# Patient Record
Sex: FEMALE | Race: WHITE | NOT HISPANIC OR LATINO | Employment: OTHER | URBAN - NONMETROPOLITAN AREA
[De-identification: names, ages, dates, MRNs, and addresses within clinical notes are randomized per-mention and may not be internally consistent; named-entity substitution may affect disease eponyms.]

---

## 2018-07-18 ENCOUNTER — TRANSCRIBE ORDERS (OUTPATIENT)
Dept: LAB | Facility: CLINIC | Age: 53
End: 2018-07-18

## 2018-07-18 ENCOUNTER — APPOINTMENT (OUTPATIENT)
Dept: LAB | Facility: CLINIC | Age: 53
End: 2018-07-18
Payer: MEDICARE

## 2018-07-18 DIAGNOSIS — R19.7 DIARRHEA, UNSPECIFIED TYPE: ICD-10-CM

## 2018-07-18 DIAGNOSIS — K21.9 GASTRO-ESOPHAGEAL REFLUX DISEASE WITHOUT ESOPHAGITIS: ICD-10-CM

## 2018-07-18 DIAGNOSIS — R10.31 RIGHT LOWER QUADRANT PAIN: ICD-10-CM

## 2018-07-18 DIAGNOSIS — D51.9 ANEMIA DUE TO VITAMIN B12 DEFICIENCY, UNSPECIFIED B12 DEFICIENCY TYPE: ICD-10-CM

## 2018-07-18 DIAGNOSIS — E55.9 VITAMIN D DEFICIENCY: ICD-10-CM

## 2018-07-18 DIAGNOSIS — K50.018 CROHN'S DISEASE OF SMALL INTESTINE WITH OTHER COMPLICATION (HCC): Primary | ICD-10-CM

## 2018-07-18 DIAGNOSIS — K50.018 CROHN'S DISEASE OF SMALL INTESTINE WITH OTHER COMPLICATION (HCC): ICD-10-CM

## 2018-07-18 LAB
ALBUMIN SERPL BCP-MCNC: 4.3 G/DL (ref 3.5–5)
ALP SERPL-CCNC: 86 U/L (ref 46–116)
ALT SERPL W P-5'-P-CCNC: 25 U/L (ref 12–78)
ANION GAP SERPL CALCULATED.3IONS-SCNC: 7 MMOL/L (ref 4–13)
AST SERPL W P-5'-P-CCNC: 20 U/L (ref 5–45)
BASOPHILS # BLD AUTO: 0.05 THOUSANDS/ΜL (ref 0–0.1)
BASOPHILS NFR BLD AUTO: 1 % (ref 0–1)
BILIRUB SERPL-MCNC: 0.34 MG/DL (ref 0.2–1)
BUN SERPL-MCNC: 8 MG/DL (ref 5–25)
CALCIUM SERPL-MCNC: 9.4 MG/DL (ref 8.3–10.1)
CHLORIDE SERPL-SCNC: 101 MMOL/L (ref 100–108)
CO2 SERPL-SCNC: 28 MMOL/L (ref 21–32)
CREAT SERPL-MCNC: 0.78 MG/DL (ref 0.6–1.3)
EOSINOPHIL # BLD AUTO: 0.15 THOUSAND/ΜL (ref 0–0.61)
EOSINOPHIL NFR BLD AUTO: 2 % (ref 0–6)
ERYTHROCYTE [DISTWIDTH] IN BLOOD BY AUTOMATED COUNT: 12.8 % (ref 11.6–15.1)
ERYTHROCYTE [SEDIMENTATION RATE] IN BLOOD: 16 MM/HOUR (ref 0–20)
GFR SERPL CREATININE-BSD FRML MDRD: 87 ML/MIN/1.73SQ M
GLUCOSE SERPL-MCNC: 86 MG/DL (ref 65–140)
HCT VFR BLD AUTO: 41.7 % (ref 34.8–46.1)
HGB BLD-MCNC: 13.6 G/DL (ref 11.5–15.4)
IMM GRANULOCYTES # BLD AUTO: 0.01 THOUSAND/UL (ref 0–0.2)
IMM GRANULOCYTES NFR BLD AUTO: 0 % (ref 0–2)
LYMPHOCYTES # BLD AUTO: 2.32 THOUSANDS/ΜL (ref 0.6–4.47)
LYMPHOCYTES NFR BLD AUTO: 30 % (ref 14–44)
MCH RBC QN AUTO: 31.5 PG (ref 26.8–34.3)
MCHC RBC AUTO-ENTMCNC: 32.6 G/DL (ref 31.4–37.4)
MCV RBC AUTO: 97 FL (ref 82–98)
MONOCYTES # BLD AUTO: 0.76 THOUSAND/ΜL (ref 0.17–1.22)
MONOCYTES NFR BLD AUTO: 10 % (ref 4–12)
NEUTROPHILS # BLD AUTO: 4.58 THOUSANDS/ΜL (ref 1.85–7.62)
NEUTS SEG NFR BLD AUTO: 57 % (ref 43–75)
NRBC BLD AUTO-RTO: 0 /100 WBCS
PLATELET # BLD AUTO: 239 THOUSANDS/UL (ref 149–390)
PMV BLD AUTO: 13.5 FL (ref 8.9–12.7)
POTASSIUM SERPL-SCNC: 4.1 MMOL/L (ref 3.5–5.3)
PROT SERPL-MCNC: 8 G/DL (ref 6.4–8.2)
RBC # BLD AUTO: 4.32 MILLION/UL (ref 3.81–5.12)
SODIUM SERPL-SCNC: 136 MMOL/L (ref 136–145)
WBC # BLD AUTO: 7.87 THOUSAND/UL (ref 4.31–10.16)

## 2018-07-18 PROCEDURE — 36415 COLL VENOUS BLD VENIPUNCTURE: CPT

## 2018-07-18 PROCEDURE — 85652 RBC SED RATE AUTOMATED: CPT

## 2018-07-18 PROCEDURE — 80053 COMPREHEN METABOLIC PANEL: CPT

## 2018-07-18 PROCEDURE — 85025 COMPLETE CBC W/AUTO DIFF WBC: CPT

## 2018-08-07 ENCOUNTER — TRANSCRIBE ORDERS (OUTPATIENT)
Dept: ADMINISTRATIVE | Facility: HOSPITAL | Age: 53
End: 2018-08-07

## 2018-08-07 DIAGNOSIS — K50.00 CROHN'S DISEASE OF SMALL INTESTINE WITHOUT COMPLICATION (HCC): Primary | ICD-10-CM

## 2018-08-07 DIAGNOSIS — R10.11 RUQ PAIN: ICD-10-CM

## 2018-08-07 DIAGNOSIS — R11.0 NAUSEA: ICD-10-CM

## 2018-08-07 DIAGNOSIS — K21.9 GASTROESOPHAGEAL REFLUX DISEASE WITHOUT ESOPHAGITIS: ICD-10-CM

## 2018-08-07 DIAGNOSIS — D64.9 ANEMIA, UNSPECIFIED TYPE: ICD-10-CM

## 2018-08-07 DIAGNOSIS — E55.9 VITAMIN D DEFICIENCY: ICD-10-CM

## 2018-08-07 DIAGNOSIS — E53.9 VITAMIN B DEFICIENCY: ICD-10-CM

## 2018-08-22 ENCOUNTER — HOSPITAL ENCOUNTER (OUTPATIENT)
Dept: ULTRASOUND IMAGING | Facility: HOSPITAL | Age: 53
Discharge: HOME | End: 2018-08-22
Payer: MEDICARE

## 2018-08-22 ENCOUNTER — HOSPITAL ENCOUNTER (OUTPATIENT)
Dept: NUCLEAR MEDICINE | Facility: HOSPITAL | Age: 53
Discharge: HOME | End: 2018-08-22
Payer: MEDICARE

## 2018-08-22 DIAGNOSIS — R10.11 RUQ PAIN: ICD-10-CM

## 2018-08-22 DIAGNOSIS — E55.9 VITAMIN D DEFICIENCY: ICD-10-CM

## 2018-08-22 DIAGNOSIS — K50.00 CROHN'S DISEASE OF SMALL INTESTINE WITHOUT COMPLICATION (HCC): ICD-10-CM

## 2018-08-22 DIAGNOSIS — E53.9 VITAMIN B DEFICIENCY: ICD-10-CM

## 2018-08-22 DIAGNOSIS — D64.9 ANEMIA, UNSPECIFIED TYPE: ICD-10-CM

## 2018-08-22 DIAGNOSIS — K21.9 GASTROESOPHAGEAL REFLUX DISEASE WITHOUT ESOPHAGITIS: ICD-10-CM

## 2018-08-22 DIAGNOSIS — R11.0 NAUSEA: ICD-10-CM

## 2018-08-22 PROCEDURE — 78227 HEPATOBIL SYST IMAGE W/DRUG: CPT

## 2018-08-22 PROCEDURE — 76705 ECHO EXAM OF ABDOMEN: CPT

## 2018-08-22 PROCEDURE — A9537 TC99M MEBROFENIN: HCPCS

## 2018-08-22 RX ADMIN — SINCALIDE 1 MCG: 5 INJECTION, POWDER, LYOPHILIZED, FOR SOLUTION INTRAVENOUS at 08:50

## 2018-09-27 ENCOUNTER — HOSPITAL ENCOUNTER (OUTPATIENT)
Facility: HOSPITAL | Age: 53
Setting detail: OUTPATIENT SURGERY
Discharge: HOME | End: 2018-09-27
Attending: SURGERY | Admitting: SURGERY
Payer: MEDICARE

## 2018-09-27 ENCOUNTER — APPOINTMENT (OUTPATIENT)
Dept: RADIOLOGY | Facility: HOSPITAL | Age: 53
End: 2018-09-27
Payer: MEDICARE

## 2018-09-27 ENCOUNTER — ANESTHESIA EVENT (OUTPATIENT)
Dept: PERIOP | Facility: HOSPITAL | Age: 53
End: 2018-09-27
Payer: MEDICARE

## 2018-09-27 ENCOUNTER — ANESTHESIA (OUTPATIENT)
Dept: PERIOP | Facility: HOSPITAL | Age: 53
End: 2018-09-27
Payer: MEDICARE

## 2018-09-27 VITALS
HEIGHT: 61 IN | RESPIRATION RATE: 18 BRPM | DIASTOLIC BLOOD PRESSURE: 82 MMHG | HEART RATE: 68 BPM | SYSTOLIC BLOOD PRESSURE: 162 MMHG | OXYGEN SATURATION: 98 % | BODY MASS INDEX: 21.14 KG/M2 | TEMPERATURE: 97 F | WEIGHT: 112 LBS

## 2018-09-27 DIAGNOSIS — K82.8 BILIARY DYSKINESIA: Primary | ICD-10-CM

## 2018-09-27 DIAGNOSIS — K81.1 CHRONIC CHOLECYSTITIS: ICD-10-CM

## 2018-09-27 PROBLEM — K82.4 GALLBLADDER POLYP: Status: ACTIVE | Noted: 2018-09-27

## 2018-09-27 PROCEDURE — 74300 X-RAY BILE DUCTS/PANCREAS: CPT

## 2018-09-27 PROCEDURE — 88304 TISSUE EXAM BY PATHOLOGIST: CPT | Performed by: PATHOLOGY

## 2018-09-27 RX ORDER — CLINDAMYCIN PHOSPHATE 900 MG/50ML
900 INJECTION INTRAVENOUS ONCE
Status: COMPLETED | OUTPATIENT
Start: 2018-09-27 | End: 2018-09-27

## 2018-09-27 RX ORDER — FENTANYL CITRATE 50 UG/ML
INJECTION, SOLUTION INTRAMUSCULAR; INTRAVENOUS AS NEEDED
Status: DISCONTINUED | OUTPATIENT
Start: 2018-09-27 | End: 2018-09-27 | Stop reason: SURG

## 2018-09-27 RX ORDER — METOCLOPRAMIDE HYDROCHLORIDE 5 MG/ML
10 INJECTION INTRAMUSCULAR; INTRAVENOUS ONCE AS NEEDED
Status: DISCONTINUED | OUTPATIENT
Start: 2018-09-27 | End: 2018-09-27 | Stop reason: HOSPADM

## 2018-09-27 RX ORDER — SCOLOPAMINE TRANSDERMAL SYSTEM 1 MG/1
1 PATCH, EXTENDED RELEASE TRANSDERMAL ONCE
Status: DISCONTINUED | OUTPATIENT
Start: 2018-09-27 | End: 2018-09-27 | Stop reason: HOSPADM

## 2018-09-27 RX ORDER — DEXAMETHASONE SODIUM PHOSPHATE 4 MG/ML
INJECTION, SOLUTION INTRA-ARTICULAR; INTRALESIONAL; INTRAMUSCULAR; INTRAVENOUS; SOFT TISSUE AS NEEDED
Status: DISCONTINUED | OUTPATIENT
Start: 2018-09-27 | End: 2018-09-27 | Stop reason: SURG

## 2018-09-27 RX ORDER — PROPOFOL 10 MG/ML
INJECTION, EMULSION INTRAVENOUS AS NEEDED
Status: DISCONTINUED | OUTPATIENT
Start: 2018-09-27 | End: 2018-09-27 | Stop reason: SURG

## 2018-09-27 RX ORDER — SODIUM CHLORIDE, SODIUM LACTATE, POTASSIUM CHLORIDE, CALCIUM CHLORIDE 600; 310; 30; 20 MG/100ML; MG/100ML; MG/100ML; MG/100ML
100 INJECTION, SOLUTION INTRAVENOUS CONTINUOUS
Status: DISCONTINUED | OUTPATIENT
Start: 2018-09-27 | End: 2018-09-27 | Stop reason: HOSPADM

## 2018-09-27 RX ORDER — PROPOFOL 10 MG/ML
INJECTION, EMULSION INTRAVENOUS AS NEEDED
Status: DISCONTINUED | OUTPATIENT
Start: 2018-09-27 | End: 2018-09-27

## 2018-09-27 RX ORDER — OXYCODONE HYDROCHLORIDE AND ACETAMINOPHEN 5; 325 MG/1; MG/1
2 TABLET ORAL EVERY 4 HOURS PRN
Status: DISCONTINUED | OUTPATIENT
Start: 2018-09-27 | End: 2018-09-27 | Stop reason: HOSPADM

## 2018-09-27 RX ORDER — OXYCODONE HYDROCHLORIDE AND ACETAMINOPHEN 5; 325 MG/1; MG/1
1 TABLET ORAL EVERY 4 HOURS PRN
Qty: 15 TABLET | Refills: 0 | Status: SHIPPED | OUTPATIENT
Start: 2018-09-27 | End: 2018-10-07

## 2018-09-27 RX ORDER — KETOROLAC TROMETHAMINE 30 MG/ML
INJECTION, SOLUTION INTRAMUSCULAR; INTRAVENOUS AS NEEDED
Status: DISCONTINUED | OUTPATIENT
Start: 2018-09-27 | End: 2018-09-27 | Stop reason: SURG

## 2018-09-27 RX ORDER — ONDANSETRON 2 MG/ML
INJECTION INTRAMUSCULAR; INTRAVENOUS AS NEEDED
Status: DISCONTINUED | OUTPATIENT
Start: 2018-09-27 | End: 2018-09-27 | Stop reason: SURG

## 2018-09-27 RX ORDER — SODIUM CHLORIDE, SODIUM LACTATE, POTASSIUM CHLORIDE, CALCIUM CHLORIDE 600; 310; 30; 20 MG/100ML; MG/100ML; MG/100ML; MG/100ML
125 INJECTION, SOLUTION INTRAVENOUS CONTINUOUS
Status: DISCONTINUED | OUTPATIENT
Start: 2018-09-27 | End: 2018-09-27 | Stop reason: HOSPADM

## 2018-09-27 RX ORDER — LIDOCAINE HYDROCHLORIDE 10 MG/ML
INJECTION, SOLUTION INFILTRATION; PERINEURAL AS NEEDED
Status: DISCONTINUED | OUTPATIENT
Start: 2018-09-27 | End: 2018-09-27 | Stop reason: SURG

## 2018-09-27 RX ORDER — ROCURONIUM BROMIDE 10 MG/ML
INJECTION, SOLUTION INTRAVENOUS AS NEEDED
Status: DISCONTINUED | OUTPATIENT
Start: 2018-09-27 | End: 2018-09-27 | Stop reason: SURG

## 2018-09-27 RX ORDER — MIDAZOLAM HYDROCHLORIDE 1 MG/ML
INJECTION INTRAMUSCULAR; INTRAVENOUS AS NEEDED
Status: DISCONTINUED | OUTPATIENT
Start: 2018-09-27 | End: 2018-09-27 | Stop reason: SURG

## 2018-09-27 RX ORDER — BUPIVACAINE HYDROCHLORIDE 5 MG/ML
INJECTION, SOLUTION PERINEURAL AS NEEDED
Status: DISCONTINUED | OUTPATIENT
Start: 2018-09-27 | End: 2018-09-27 | Stop reason: HOSPADM

## 2018-09-27 RX ADMIN — FENTANYL CITRATE 100 MCG: 50 INJECTION INTRAMUSCULAR; INTRAVENOUS at 12:23

## 2018-09-27 RX ADMIN — SODIUM CHLORIDE, POTASSIUM CHLORIDE, SODIUM LACTATE AND CALCIUM CHLORIDE: 600; 310; 30; 20 INJECTION, SOLUTION INTRAVENOUS at 13:06

## 2018-09-27 RX ADMIN — PROPOFOL 150 MG: 10 INJECTION, EMULSION INTRAVENOUS at 12:24

## 2018-09-27 RX ADMIN — SUGAMMADEX 102 MG: 100 INJECTION, SOLUTION INTRAVENOUS at 12:55

## 2018-09-27 RX ADMIN — CLINDAMYCIN PHOSPHATE 900 MG: 18 INJECTION, SOLUTION INTRAMUSCULAR; INTRAVENOUS at 12:22

## 2018-09-27 RX ADMIN — DEXAMETHASONE SODIUM PHOSPHATE 4 MG: 4 INJECTION, SOLUTION INTRA-ARTICULAR; INTRALESIONAL; INTRAMUSCULAR; INTRAVENOUS; SOFT TISSUE at 12:40

## 2018-09-27 RX ADMIN — SCOPOLAMINE 1 PATCH: 1 PATCH, EXTENDED RELEASE TRANSDERMAL at 11:32

## 2018-09-27 RX ADMIN — MIDAZOLAM HYDROCHLORIDE 2 MG: 1 INJECTION, SOLUTION INTRAMUSCULAR; INTRAVENOUS at 12:19

## 2018-09-27 RX ADMIN — OXYCODONE HYDROCHLORIDE AND ACETAMINOPHEN 1 TABLET: 5; 325 TABLET ORAL at 14:49

## 2018-09-27 RX ADMIN — FENTANYL CITRATE 50 MCG: 50 INJECTION INTRAMUSCULAR; INTRAVENOUS at 13:15

## 2018-09-27 RX ADMIN — ROCURONIUM BROMIDE 30 MG: 10 INJECTION INTRAVENOUS at 12:24

## 2018-09-27 RX ADMIN — SODIUM CHLORIDE, POTASSIUM CHLORIDE, SODIUM LACTATE AND CALCIUM CHLORIDE 125 ML/HR: 600; 310; 30; 20 INJECTION, SOLUTION INTRAVENOUS at 10:56

## 2018-09-27 RX ADMIN — HYDROMORPHONE HYDROCHLORIDE 0.5 MG: 1 INJECTION, SOLUTION INTRAMUSCULAR; INTRAVENOUS; SUBCUTANEOUS at 13:48

## 2018-09-27 RX ADMIN — LIDOCAINE HYDROCHLORIDE 100 MG: 10 INJECTION, SOLUTION INFILTRATION; PERINEURAL at 12:24

## 2018-09-27 RX ADMIN — FENTANYL CITRATE 25 MCG: 50 INJECTION INTRAMUSCULAR; INTRAVENOUS at 13:01

## 2018-09-27 RX ADMIN — KETOROLAC TROMETHAMINE 30 MG: 30 INJECTION, SOLUTION INTRAMUSCULAR at 12:55

## 2018-09-27 RX ADMIN — ONDANSETRON HYDROCHLORIDE 4 MG: 2 INJECTION, SOLUTION INTRAVENOUS at 12:40

## 2018-09-27 RX ADMIN — FENTANYL CITRATE 25 MCG: 50 INJECTION INTRAMUSCULAR; INTRAVENOUS at 13:09

## 2018-09-27 NOTE — OP NOTE
OPERATIVE REPORT  PATIENT NAME: Cesar Grossman    :  1965  MRN: [id number]  Pt Location: [address] [place] [address] OR ROOM 03    SURGERY DATE: 2018    Surgeon(s) and Role:     * Rachel Howe MD - Primary     * Camron Feliciano PA-C  The PA was necessary to provide expert assistance and optimize patient safety in the abscence of a qualified surgical resident  Preop Diagnosis:  Chronic cholecystitis [K81 1]    Post-Op Diagnosis Codes:     * Chronic cholecystitis [K81 1]     * Biliary dyskinesia [K82 8]     * Gallbladder polyp [K82 4]    Procedure(s) (LRB):  LAPAROSCOPIC CHOLECYSTECTOMY WITH CHOLANGIOGRAM (N/A)    Specimen(s):  ID Type Source Tests Collected by Time Destination   1 : gallbladder and contents  Tissue Gallbladder TISSUE EXAM Rachel Howe MD 2018 1225        Estimated Blood Loss:   Minimal    Drains:       Anesthesia Type:   General    Operative Indications:  Chronic cholecystitis [K81 1]  The patient is a pleasant 51-year-old female presenting with symptomatic biliary dyskinesia as well as a gallbladder polyp described on ultrasound for which definitive diagnosis and treatment by laparoscopic cholecystectomy is now indicated  Operative Findings: At the time of the procedure, 4 quadrants of the abdomen were inspected laparoscopically  The only pathology of note was a chronically inflamed gallbladder  During the course of the dissection the critical structures identified  Cystic duct dissected out circumferentially  Cystic artery dissected out circumferentially  Cystic artery clipped twice below and divided above with under be  A fluoroscopic cholangiogram was performed  This confirmed the proper anatomy  There is free flow of contrast antegrade and retrograde in the common bile duct  No evidence for choledocholithiasis  Free flow of contrast into the duodenal sweep  The cholecystectomy completed laparoscopically  The patient tolerated the procedure well      Complications: None    Procedure and Technique:  The patient was taken to the operating room where they were properly identified, monitored and anesthetized  They received antibiotics perioperatively  Venodyne's were placed prior to the induction of anesthesia for DVT prophylaxis  The abdomen prepped and draped under sterile conditions using aseptic technique  Timeout performed  Skin incised at the umbilicus  Peritoneal cavity entered bluntly with a Julita clamp  11 mm trocar inserted and pneumoperitoneum established to 15 mmHg  4 quadrants of the abdomen and inspected laparoscopically and no additional pathology was identified  3 additional working ports placed  These were 5 mm ports in the epigastrium and right upper quadrant  The patient placed in reverse Trendelenburg, left side down  Gallbladder grasped at its dome retracted cephalad and to the right  Infundibulum grasped and retracted laterally  [place] defined and skeletonized  Cystic duct dissected out circumferentially  Cystic artery dissected out circumferentially  Cystic artery clipped below and divided above with harmonic princess  A clip placed on the up side of the cystic duct  An nick made in the cystic duct and a cholangiocatheter advanced and secured  Fluoroscopic cholangiogram was performed with the above findings noted  Cholangiocatheter removed  Cystic duct clipped twice on the down side and divided between clips  Gallbladder dissected off the liver with harmonic princess  The gallbladder was placed in an Endobag and delivered through the umbilical trocar site  Pneumoperitoneum reestablished to 15 mmHg  Scope advanced and the right upper quadrant inspected  Good hemostasis found  The procedure completed with removal of all ports  The fascial defect at the umbilicus closed with a figure-of-eight suture of 0 Vicryl  Skin closed with subcuticular 4-0 Monocryl suture  Wounds infiltrated with half percent Marcaine  The wounds dressed   The patient extubated and taken to recovery in stable condition         I was present for the entire procedure    Patient Disposition:  PACU     SIGNATURE: Lisa Richter MD  DATE: September 27, 2018  TIME: 1:01 PM

## 2018-09-27 NOTE — ANESTHESIA POSTPROCEDURE EVALUATION
Post-Op Assessment Note      CV Status:  Stable    Mental Status:  Alert and awake    Hydration Status:  Euvolemic    PONV Controlled:  Controlled    Airway Patency:  Patent    Post Op Vitals Reviewed: Yes          Staff: CRNA           BP   134/98   Temp  96 4   Pulse  82   Resp   16   SpO2   95

## 2018-09-27 NOTE — ANESTHESIA PREPROCEDURE EVALUATION
Review of Systems/Medical History  Patient summary reviewed  Chart reviewed  History of anesthetic complications PONV    Cardiovascular  Hyperlipidemia,    Pulmonary       GI/Hepatic    GERD ,  Hiatal hernia,             Endo/Other  History of thyroid disease ,   Comment: Thyroid CA thyroidectomy, ponv    GYN       Hematology   Musculoskeletal       Neurology   Psychology         Lab Results   Component Value Date    WBC 7 87 07/18/2018    HGB 13 6 07/18/2018    HCT 41 7 07/18/2018    MCV 97 07/18/2018     07/18/2018     Lab Results   Component Value Date    CALCIUM 9 4 07/18/2018     07/18/2018    K 4 1 07/18/2018    CO2 28 07/18/2018     07/18/2018    BUN 8 07/18/2018    CREATININE 0 78 07/18/2018     No results found for: INR, PROTIME  No results found for: PTT    Physical Exam    Airway    Mallampati score: III  TM Distance: >3 FB  Neck ROM: full     Dental   No notable dental hx     Cardiovascular  Cardiovascular exam normal    Pulmonary  Pulmonary exam normal     Other Findings        Anesthesia Plan  ASA Score- 2     Anesthesia Type- general with ASA Monitors  Additional Monitors:   Airway Plan: ETT  Plan Factors-    Induction- intravenous  Postoperative Plan- Plan for postoperative opioid use  Planned trial extubation    Informed Consent- Anesthetic plan and risks discussed with patient  I personally reviewed this patient with the CRNA  Discussed and agreed on the Anesthesia Plan with the CRNA  Shruthi Chavarria

## 2018-09-27 NOTE — DISCHARGE INSTRUCTIONS
[facility] discharge instructions    1  No driving 1 week     2  No strenuous activity for 2 weeks  3   If you have a dressing on your skin, remove it in 2 days and then you may shower over the wound with soap and water  4   Notify the office for development of fevers, chills, worsening pain, loss of appetite  5   Call [phone] with any questions or concerns  Biliary Dyskinesia   AMBULATORY CARE:   Biliary dyskinesia  is a condition that causes pain in your gallbladder (in your upper right abdomen)  The gallbladder stores bile made by the liver  Bile is used to help break down fat in the food you eat  The gallbladder has a valve called a sphincter that prevents bile from flowing out of the gallbladder until it is needed  The bile moves through a duct and into the small intestine  If the sphincter is scarred or has spasms, bile cannot flow out of the gallbladder  The bile then flows back into the gallbladder and causes pain  Common signs and symptoms of biliary dyskinesia:   · Pain in your upper right abdomen that lasts at least 30 minutes at a time, and comes and goes    · Severe pain that keeps you from doing your daily activities or wakes you from sleep    · Pain after you eat that continues even after you have a bowel movement or change position    · Jaundice    · Nausea, vomiting, or bloating    · Weight loss without trying, or loss of appetite  Seek care immediately if:   · You have a fever and chills  · Your eyes or skin turn yellow  Contact your healthcare provider if:   · Your urine is dark  · Your pain does not get better with pain medicine  · You have eli-colored bowel movements  · You have new or worsening symptoms  · You have questions or concerns about your condition or care  Treatment:  Your symptoms may go away without treatment   You may need any of the following if your symptoms are severe or continue:  · Prescription pain medicine  may be given  Ask your healthcare provider how to take this medicine safely  Some prescription pain medicines contain acetaminophen  Do not take other medicines that contain acetaminophen without talking to your healthcare provider  Too much acetaminophen may cause liver damage  Prescription pain medicine may cause constipation  Ask your healthcare provider how to prevent or treat constipation  · NSAIDs , such as ibuprofen, help decrease swelling, pain, and fever  This medicine is available with or without a doctor's order  NSAIDs can cause stomach bleeding or kidney problems in certain people  If you take blood thinner medicine, always ask if NSAIDs are safe for you  Always read the medicine label and follow directions  Do not give these medicines to children under 10months of age without direction from your child's healthcare provider  · Surgery  may be used to remove your gallbladder  Gallbladder surgery is usually not done on young children  Manage biliary dyskinesia:   · Maintain a healthy weight  Extra body weight can increase your risk for gallbladder problems, and can make your pain worse  Try not to gain or lose a large amount of weight quickly  This can also increase or worsen your risk for gallbladder problems  Ask your healthcare provider to help you create a healthy weight loss plan if you are overweight  · Eat a variety of healthy foods  Healthy foods include fruits, vegetables, low-fat dairy products, lean meats, fish, and cooked beans  Ask if you need to be on a special diet  Your healthcare provider may recommend a low-fat diet  Choose healthy fats, such as olive oil, canola oil, avocado, and nuts  Omega 3 fats are also healthy  Omega 3 fats are in fish, such as salmon, trout, and tuna  They are also in plant foods such as flaxseed, walnuts, and soybeans  You may also need to avoid any foods that trigger your symptoms    Follow up with your healthcare provider as directed:  Write down your questions so you remember to ask them during your visits  © 2017 [employer] Information is for End User's use only and may not be sold, redistributed or otherwise used for commercial purposes  All illustrations and images included in CareNotes® are the copyrighted property of A D A M , Inc  or [employer]  The above information is an  only  It is not intended as medical advice for individual conditions or treatments  Talk to your doctor, nurse or pharmacist before following any medical regimen to see if it is safe and effective for you

## 2018-10-15 ENCOUNTER — OFFICE VISIT (OUTPATIENT)
Dept: SURGERY | Facility: CLINIC | Age: 53
End: 2018-10-15

## 2018-10-15 DIAGNOSIS — K82.8 BILIARY DYSKINESIA: Primary | ICD-10-CM

## 2018-10-15 DIAGNOSIS — K82.4 GALLBLADDER POLYP: ICD-10-CM

## 2018-10-15 PROCEDURE — 99024 POSTOP FOLLOW-UP VISIT: CPT | Performed by: SURGERY

## 2018-10-15 NOTE — LETTER
October 17, 2018     Arely Parekh MD  [facility]    Patient: Bonnie Romero   YOB: 1965   Date of Visit: 10/15/2018       Dear Dr Twin Meng: Thank you for referring Cristi Serrano to me for evaluation  Below are my notes for this consultation  If you have questions, please do not hesitate to call me  I look forward to following your patient along with you           Sincerely,        Lavelle Hook MD        CC: No Recipients

## 2018-10-15 NOTE — PROGRESS NOTES
Assessment/Plan:    Biliary dyskinesia  The patient is well following the definitive treatment of her symptomatic biliary dyskinesia by laparoscopic cholecystectomy with intraoperative cholangiogram     The operative note and pathology report were explained to the patient  All questions answered to their satisfaction and they have been instructed follow up with [facility] on an as-needed basis  Diagnoses and all orders for this visit:    Biliary dyskinesia    Gallbladder polyp          Subjective:      Patient ID: Priscila Medellin is a 48 y o  female  Nurse Note:  The patient is here today for post op lap sami and she stated that she can feel a stitch on her right lower incision area and she does feel a little sore  Maribel Adenitis MA        The following portions of the patient's history were reviewed and updated as appropriate: allergies, current medications, past family history, past medical history, past social history, past surgical history and problem list     Review of Systems   Constitutional: Negative  HENT: Negative  Eyes: Negative  Respiratory: Negative  Cardiovascular: Negative  Gastrointestinal: Negative  Endocrine: Negative  Genitourinary: Negative  Musculoskeletal: Negative  Skin: Negative  Allergic/Immunologic: Negative  Neurological: Negative  Hematological: Negative  Psychiatric/Behavioral: Negative  Objective: There were no vitals taken for this visit  Physical Exam   Constitutional: She is oriented to person, place, and time  She appears well-developed and well-nourished  HENT:   Head: Normocephalic and atraumatic  Eyes: Pupils are equal, round, and reactive to light  Conjunctivae are normal    Neck: Normal range of motion  Neck supple  Cardiovascular: Normal rate and regular rhythm  Pulmonary/Chest: Effort normal and breath sounds normal    Abdominal: Soft   Bowel sounds are normal    The surgical wounds are clean dry and intact  Musculoskeletal: Normal range of motion  Neurological: She is alert and oriented to person, place, and time  Skin: Skin is warm and dry     Psychiatric: Her behavior is normal  Judgment and thought content normal

## 2018-10-17 NOTE — ASSESSMENT & PLAN NOTE
The patient is well following the definitive treatment of her symptomatic biliary dyskinesia by laparoscopic cholecystectomy with intraoperative cholangiogram     The operative note and pathology report were explained to the patient  All questions answered to their satisfaction and they have been instructed follow up with [facility] on an as-needed basis

## 2018-11-28 ENCOUNTER — TRANSCRIBE ORDERS (OUTPATIENT)
Dept: ADMINISTRATIVE | Facility: HOSPITAL | Age: 53
End: 2018-11-28

## 2018-11-28 ENCOUNTER — APPOINTMENT (OUTPATIENT)
Dept: LAB | Facility: HOSPITAL | Age: 53
End: 2018-11-28
Attending: INTERNAL MEDICINE
Payer: MEDICARE

## 2018-11-28 DIAGNOSIS — K50.919 CROHN'S DISEASE WITH COMPLICATION, UNSPECIFIED GASTROINTESTINAL TRACT LOCATION (HCC): ICD-10-CM

## 2018-11-28 DIAGNOSIS — K50.919 CROHN'S DISEASE WITH COMPLICATION, UNSPECIFIED GASTROINTESTINAL TRACT LOCATION (HCC): Primary | ICD-10-CM

## 2018-11-28 LAB
25(OH)D3 SERPL-MCNC: 35.9 NG/ML (ref 30–100)
ALBUMIN SERPL BCP-MCNC: 5 G/DL (ref 3.5–5.7)
ALP SERPL-CCNC: 82 U/L (ref 40–150)
ALT SERPL W P-5'-P-CCNC: 12 U/L (ref 7–52)
ANION GAP SERPL CALCULATED.3IONS-SCNC: 8 MMOL/L (ref 4–13)
AST SERPL W P-5'-P-CCNC: 19 U/L (ref 13–39)
BASOPHILS # BLD AUTO: 0.1 THOUSANDS/ΜL (ref 0–0.1)
BASOPHILS NFR BLD AUTO: 1 % (ref 0–2)
BILIRUB SERPL-MCNC: 0.5 MG/DL (ref 0.2–1)
BILIRUB UR QL STRIP: NEGATIVE
BUN SERPL-MCNC: 7 MG/DL (ref 7–25)
CALCIUM SERPL-MCNC: 9.9 MG/DL (ref 8.6–10.5)
CHLORIDE SERPL-SCNC: 99 MMOL/L (ref 98–107)
CHOLEST SERPL-MCNC: 188 MG/DL (ref 0–200)
CLARITY UR: CLEAR
CO2 SERPL-SCNC: 31 MMOL/L (ref 21–31)
COLOR UR: ABNORMAL
CREAT SERPL-MCNC: 0.81 MG/DL (ref 0.6–1.2)
EOSINOPHIL # BLD AUTO: 0.1 THOUSAND/ΜL (ref 0–0.61)
EOSINOPHIL NFR BLD AUTO: 2 % (ref 0–5)
ERYTHROCYTE [DISTWIDTH] IN BLOOD BY AUTOMATED COUNT: 13 % (ref 11.5–14.5)
GFR SERPL CREATININE-BSD FRML MDRD: 83 ML/MIN/1.73SQ M
GLUCOSE SERPL-MCNC: 96 MG/DL (ref 65–99)
GLUCOSE UR STRIP-MCNC: NEGATIVE MG/DL
HCT VFR BLD AUTO: 42.4 % (ref 34.8–46.1)
HDLC SERPL-MCNC: 76 MG/DL (ref 40–60)
HGB BLD-MCNC: 13.7 G/DL (ref 12–16)
HGB UR QL STRIP.AUTO: NEGATIVE
IRON SERPL-MCNC: 79 UG/DL (ref 50–170)
KETONES UR STRIP-MCNC: NEGATIVE MG/DL
LDLC SERPL CALC-MCNC: 92 MG/DL (ref 75–193)
LEUKOCYTE ESTERASE UR QL STRIP: NEGATIVE
LYMPHOCYTES # BLD AUTO: 1.9 THOUSANDS/ΜL (ref 0.6–4.47)
LYMPHOCYTES NFR BLD AUTO: 28 % (ref 21–51)
MCH RBC QN AUTO: 30.5 PG (ref 26–34)
MCHC RBC AUTO-ENTMCNC: 32.4 G/DL (ref 31–37)
MCV RBC AUTO: 94 FL (ref 81–99)
MONOCYTES # BLD AUTO: 0.6 THOUSAND/ΜL (ref 0.17–1.22)
MONOCYTES NFR BLD AUTO: 9 % (ref 2–12)
NEUTROPHILS # BLD AUTO: 4.1 THOUSANDS/ΜL (ref 1.4–6.5)
NEUTS SEG NFR BLD AUTO: 61 % (ref 42–75)
NITRITE UR QL STRIP: NEGATIVE
NONHDLC SERPL-MCNC: 112 MG/DL
NRBC BLD AUTO-RTO: 0 /100 WBCS
PH UR STRIP.AUTO: 6.5 [PH] (ref 5–8)
PLATELET # BLD AUTO: 243 THOUSANDS/UL (ref 149–390)
PMV BLD AUTO: 10.4 FL (ref 8.6–11.7)
POTASSIUM SERPL-SCNC: 3.8 MMOL/L (ref 3.5–5.5)
PROT SERPL-MCNC: 7.8 G/DL (ref 6.4–8.9)
PROT UR STRIP-MCNC: NEGATIVE MG/DL
RBC # BLD AUTO: 4.51 MILLION/UL (ref 3.9–5.2)
SODIUM SERPL-SCNC: 138 MMOL/L (ref 134–143)
SP GR UR STRIP.AUTO: <=1.005 (ref 1–1.03)
T4 FREE SERPL-MCNC: 1.1 NG/DL (ref 0.76–1.46)
TRIGL SERPL-MCNC: 100 MG/DL (ref 44–166)
TSH SERPL DL<=0.05 MIU/L-ACNC: 9.31 UIU/ML (ref 0.45–5.33)
UROBILINOGEN UR QL STRIP.AUTO: 0.2 E.U./DL
VIT B12 SERPL-MCNC: 1821 PG/ML (ref 100–900)
WBC # BLD AUTO: 6.7 THOUSAND/UL (ref 4.8–10.8)

## 2018-11-28 PROCEDURE — 83540 ASSAY OF IRON: CPT

## 2018-11-28 PROCEDURE — 36415 COLL VENOUS BLD VENIPUNCTURE: CPT

## 2018-11-28 PROCEDURE — 82607 VITAMIN B-12: CPT

## 2018-11-28 PROCEDURE — 80053 COMPREHEN METABOLIC PANEL: CPT

## 2018-11-28 PROCEDURE — 80061 LIPID PANEL: CPT

## 2018-11-28 PROCEDURE — 81003 URINALYSIS AUTO W/O SCOPE: CPT | Performed by: INTERNAL MEDICINE

## 2018-11-28 PROCEDURE — 84439 ASSAY OF FREE THYROXINE: CPT

## 2018-11-28 PROCEDURE — 82306 VITAMIN D 25 HYDROXY: CPT

## 2018-11-28 PROCEDURE — 85025 COMPLETE CBC W/AUTO DIFF WBC: CPT

## 2018-11-28 PROCEDURE — 84443 ASSAY THYROID STIM HORMONE: CPT

## 2018-12-07 ENCOUNTER — TRANSCRIBE ORDERS (OUTPATIENT)
Dept: ADMINISTRATIVE | Facility: HOSPITAL | Age: 53
End: 2018-12-07

## 2018-12-07 DIAGNOSIS — Z12.39 SCREENING BREAST EXAMINATION: Primary | ICD-10-CM

## 2018-12-10 ENCOUNTER — HOSPITAL ENCOUNTER (OUTPATIENT)
Dept: MAMMOGRAPHY | Facility: HOSPITAL | Age: 53
Discharge: HOME | End: 2018-12-10
Attending: OBSTETRICS & GYNECOLOGY
Payer: MEDICARE

## 2018-12-10 DIAGNOSIS — Z12.39 SCREENING BREAST EXAMINATION: ICD-10-CM

## 2018-12-10 PROCEDURE — 77063 BREAST TOMOSYNTHESIS BI: CPT

## 2018-12-10 PROCEDURE — 77067 SCR MAMMO BI INCL CAD: CPT

## 2019-01-07 ENCOUNTER — APPOINTMENT (OUTPATIENT)
Dept: LAB | Facility: HOSPITAL | Age: 54
End: 2019-01-07
Attending: INTERNAL MEDICINE
Payer: MEDICARE

## 2019-01-07 ENCOUNTER — TRANSCRIBE ORDERS (OUTPATIENT)
Dept: ADMINISTRATIVE | Facility: HOSPITAL | Age: 54
End: 2019-01-07

## 2019-01-07 DIAGNOSIS — E03.9 HYPOTHYROIDISM, UNSPECIFIED TYPE: Primary | ICD-10-CM

## 2019-01-07 DIAGNOSIS — E03.9 HYPOTHYROIDISM, UNSPECIFIED TYPE: ICD-10-CM

## 2019-01-07 LAB
T4 FREE SERPL-MCNC: 1.17 NG/DL (ref 0.76–1.46)
TSH SERPL DL<=0.05 MIU/L-ACNC: 2.51 UIU/ML (ref 0.45–5.33)

## 2019-01-07 PROCEDURE — 36415 COLL VENOUS BLD VENIPUNCTURE: CPT

## 2019-01-07 PROCEDURE — 84439 ASSAY OF FREE THYROXINE: CPT

## 2019-01-07 PROCEDURE — 84443 ASSAY THYROID STIM HORMONE: CPT

## 2019-01-18 ENCOUNTER — HOSPITAL ENCOUNTER (OUTPATIENT)
Dept: RADIOLOGY | Facility: HOSPITAL | Age: 54
Discharge: HOME | End: 2019-01-18
Attending: INTERNAL MEDICINE
Payer: MEDICARE

## 2019-01-18 ENCOUNTER — TRANSCRIBE ORDERS (OUTPATIENT)
Dept: ADMINISTRATIVE | Facility: HOSPITAL | Age: 54
End: 2019-01-18

## 2019-01-18 DIAGNOSIS — R52 PAIN: Primary | ICD-10-CM

## 2019-01-18 DIAGNOSIS — R52 PAIN: ICD-10-CM

## 2019-01-18 PROCEDURE — 72070 X-RAY EXAM THORAC SPINE 2VWS: CPT

## 2019-02-19 ENCOUNTER — TRANSCRIBE ORDERS (OUTPATIENT)
Dept: ADMINISTRATIVE | Facility: HOSPITAL | Age: 54
End: 2019-02-19

## 2019-02-19 DIAGNOSIS — Z13.820 SCREENING FOR OSTEOPOROSIS: Primary | ICD-10-CM

## 2019-02-22 ENCOUNTER — HOSPITAL ENCOUNTER (OUTPATIENT)
Dept: BONE DENSITY | Facility: HOSPITAL | Age: 54
Discharge: HOME | End: 2019-02-22
Payer: MEDICARE

## 2019-02-22 DIAGNOSIS — Z13.820 SCREENING FOR OSTEOPOROSIS: ICD-10-CM

## 2019-02-22 PROCEDURE — 77080 DXA BONE DENSITY AXIAL: CPT

## 2019-05-09 ENCOUNTER — APPOINTMENT (OUTPATIENT)
Dept: LAB | Facility: HOSPITAL | Age: 54
End: 2019-05-09
Attending: PHYSICIAN ASSISTANT
Payer: MEDICARE

## 2019-05-09 ENCOUNTER — TRANSCRIBE ORDERS (OUTPATIENT)
Dept: RADIOLOGY | Facility: HOSPITAL | Age: 54
End: 2019-05-09

## 2019-05-09 DIAGNOSIS — K50.919 CROHN'S DISEASE WITH COMPLICATION, UNSPECIFIED GASTROINTESTINAL TRACT LOCATION (HCC): Primary | ICD-10-CM

## 2019-05-09 PROCEDURE — 36415 COLL VENOUS BLD VENIPUNCTURE: CPT

## 2019-05-09 PROCEDURE — 80299 QUANTITATIVE ASSAY DRUG: CPT

## 2019-05-09 PROCEDURE — 82397 CHEMILUMINESCENT ASSAY: CPT

## 2019-05-21 LAB — MISCELLANEOUS LAB TEST RESULT: NORMAL

## 2019-05-30 ENCOUNTER — TRANSCRIBE ORDERS (OUTPATIENT)
Dept: ADMINISTRATIVE | Facility: HOSPITAL | Age: 54
End: 2019-05-30

## 2019-05-30 DIAGNOSIS — R19.7 DIARRHEA, UNSPECIFIED TYPE: ICD-10-CM

## 2019-05-30 DIAGNOSIS — R10.31 CHRONIC RLQ PAIN: Primary | ICD-10-CM

## 2019-05-30 DIAGNOSIS — R92.8 ABNORMAL MAMMOGRAM: ICD-10-CM

## 2019-05-30 DIAGNOSIS — N60.92 BENIGN MAMMARY DYSPLASIA OF LEFT BREAST: ICD-10-CM

## 2019-05-30 DIAGNOSIS — N60.01 SOLITARY BENIGN CYST OF RIGHT BREAST: Primary | ICD-10-CM

## 2019-05-30 DIAGNOSIS — G89.29 CHRONIC RLQ PAIN: Primary | ICD-10-CM

## 2019-05-30 DIAGNOSIS — K50.919 CROHN'S DISEASE WITH COMPLICATION, UNSPECIFIED GASTROINTESTINAL TRACT LOCATION (HCC): ICD-10-CM

## 2019-06-14 ENCOUNTER — HOSPITAL ENCOUNTER (OUTPATIENT)
Dept: MAMMOGRAPHY | Facility: HOSPITAL | Age: 54
Discharge: HOME | End: 2019-06-14
Attending: OBSTETRICS & GYNECOLOGY
Payer: MEDICARE

## 2019-06-14 VITALS — HEIGHT: 61 IN | BODY MASS INDEX: 20.39 KG/M2 | WEIGHT: 108 LBS

## 2019-06-14 DIAGNOSIS — R92.8 ABNORMAL MAMMOGRAM: ICD-10-CM

## 2019-06-14 DIAGNOSIS — N60.01 SOLITARY BENIGN CYST OF RIGHT BREAST: ICD-10-CM

## 2019-06-14 DIAGNOSIS — N60.92 BENIGN MAMMARY DYSPLASIA OF LEFT BREAST: ICD-10-CM

## 2019-06-14 PROCEDURE — 77066 DX MAMMO INCL CAD BI: CPT

## 2019-06-14 PROCEDURE — G0279 TOMOSYNTHESIS, MAMMO: HCPCS

## 2019-07-10 ENCOUNTER — TRANSCRIBE ORDERS (OUTPATIENT)
Dept: LAB | Facility: HOSPITAL | Age: 54
End: 2019-07-10

## 2019-07-10 ENCOUNTER — APPOINTMENT (OUTPATIENT)
Dept: LAB | Facility: HOSPITAL | Age: 54
End: 2019-07-10
Attending: PHYSICIAN ASSISTANT
Payer: MEDICARE

## 2019-07-10 DIAGNOSIS — K50.919 CROHN'S DISEASE WITH COMPLICATION, UNSPECIFIED GASTROINTESTINAL TRACT LOCATION (HCC): Primary | ICD-10-CM

## 2019-07-10 DIAGNOSIS — K50.919 CROHN'S DISEASE WITH COMPLICATION, UNSPECIFIED GASTROINTESTINAL TRACT LOCATION (HCC): ICD-10-CM

## 2019-07-10 LAB
BASOPHILS # BLD AUTO: 0 THOUSANDS/ΜL (ref 0–0.1)
BASOPHILS NFR BLD AUTO: 1 % (ref 0–2)
CRP SERPL QL: <3 MG/L
EOSINOPHIL # BLD AUTO: 0.1 THOUSAND/ΜL (ref 0–0.61)
EOSINOPHIL NFR BLD AUTO: 2 % (ref 0–5)
ERYTHROCYTE [DISTWIDTH] IN BLOOD BY AUTOMATED COUNT: 13.3 % (ref 11.5–14.5)
ERYTHROCYTE [SEDIMENTATION RATE] IN BLOOD: 13 MM/HOUR (ref 0–30)
HCT VFR BLD AUTO: 41 % (ref 42–47)
HGB BLD-MCNC: 14.1 G/DL (ref 12–16)
LYMPHOCYTES # BLD AUTO: 2.1 THOUSANDS/ΜL (ref 0.6–4.47)
LYMPHOCYTES NFR BLD AUTO: 31 % (ref 21–51)
MCH RBC QN AUTO: 31.9 PG (ref 26–34)
MCHC RBC AUTO-ENTMCNC: 34.3 G/DL (ref 31–37)
MCV RBC AUTO: 93 FL (ref 81–99)
MONOCYTES # BLD AUTO: 0.6 THOUSAND/ΜL (ref 0.17–1.22)
MONOCYTES NFR BLD AUTO: 9 % (ref 2–12)
NEUTROPHILS # BLD AUTO: 4 THOUSANDS/ΜL (ref 1.4–6.5)
NEUTS SEG NFR BLD AUTO: 58 % (ref 42–75)
PLATELET # BLD AUTO: 217 THOUSANDS/UL (ref 149–390)
PMV BLD AUTO: 10.1 FL (ref 8.6–11.7)
RBC # BLD AUTO: 4.4 MILLION/UL (ref 3.9–5.2)
WBC # BLD AUTO: 6.9 THOUSAND/UL (ref 4.8–10.8)

## 2019-07-10 PROCEDURE — 86140 C-REACTIVE PROTEIN: CPT

## 2019-07-10 PROCEDURE — 85652 RBC SED RATE AUTOMATED: CPT

## 2019-07-10 PROCEDURE — 36415 COLL VENOUS BLD VENIPUNCTURE: CPT

## 2019-07-10 PROCEDURE — 85025 COMPLETE CBC W/AUTO DIFF WBC: CPT

## 2019-10-01 RX ORDER — PANTOPRAZOLE SODIUM 40 MG/1
40 TABLET, DELAYED RELEASE ORAL
COMMUNITY
End: 2020-04-21 | Stop reason: ALTCHOICE

## 2019-10-02 ENCOUNTER — PROCEDURE VISIT (OUTPATIENT)
Dept: SURGERY | Facility: CLINIC | Age: 54
End: 2019-10-02
Payer: MEDICARE

## 2019-10-02 VITALS
SYSTOLIC BLOOD PRESSURE: 110 MMHG | DIASTOLIC BLOOD PRESSURE: 68 MMHG | TEMPERATURE: 97.2 F | HEIGHT: 61 IN | RESPIRATION RATE: 16 BRPM | WEIGHT: 108 LBS | HEART RATE: 68 BPM | BODY MASS INDEX: 20.39 KG/M2

## 2019-10-02 DIAGNOSIS — K50.90 CROHN'S DISEASE (HCC): Primary | ICD-10-CM

## 2019-10-02 PROCEDURE — 99214 OFFICE O/P EST MOD 30 MIN: CPT | Performed by: SURGERY

## 2019-10-02 RX ORDER — ONDANSETRON 2 MG/ML
4 INJECTION INTRAMUSCULAR; INTRAVENOUS ONCE
Status: CANCELLED | OUTPATIENT
Start: 2019-10-02 | End: 2019-10-02

## 2019-10-02 RX ORDER — MULTIVIT-MIN/IRON/FOLIC ACID/K 18-600-40
2000 CAPSULE ORAL DAILY
COMMUNITY

## 2019-10-02 RX ORDER — LEVOFLOXACIN 5 MG/ML
500 INJECTION, SOLUTION INTRAVENOUS ONCE
Status: CANCELLED | OUTPATIENT
Start: 2019-10-10 | End: 2019-10-02

## 2019-10-02 RX ORDER — SUCRALFATE 1 G/1
1 TABLET ORAL
COMMUNITY
Start: 2019-08-08 | End: 2019-11-01

## 2019-10-02 RX ORDER — PREDNISOLONE ACETATE 10 MG/ML
SUSPENSION/ DROPS OPHTHALMIC
Refills: 0 | COMMUNITY
Start: 2019-09-24 | End: 2019-11-01

## 2019-10-02 RX ORDER — OMEPRAZOLE 20 MG/1
20 CAPSULE, DELAYED RELEASE ORAL
COMMUNITY
Start: 2019-09-24 | End: 2020-08-20 | Stop reason: ALTCHOICE

## 2019-10-02 NOTE — ASSESSMENT & PLAN NOTE
Patient is a pleasant 40-year-old female with a past medical history significant for Crohn's disease for which she is currently receiving infusion therapy with Entyvio  Due to breakthrough symptoms the patient is therapy is going to be increased from Q 8 weeks 2 Q 6 weeks  Due to the lack of reliable peripheral vascular access she has been recommended to undergo MediPort placement  On physical examination today she is a pleasant female who appears her stated age  She is awake alert and oriented  She has a competent reliable historian  She has a normal chest wall to inspection  Her lungs are clear to auscultation bilaterally  My impression is that the patient is an appropriate candidate for MediPort insertion under fluoroscopy  The technical details of the procedures well as the benefits and risks were reviewed  Specific risks related to anesthesia, bleeding, infection, neurovascular injury and 2% risk of pneumothorax requiring chest tube insertion were all explained  All questions answered to satisfaction of the patient and informed consent taken to proceed  We will attempt a left-sided placement 1st and the patient understands that if we are unsuccessful will move to the right

## 2019-10-02 NOTE — H&P
Assessment/Plan:    Crohn's disease ([facility] 75 )  Patient is a pleasant 71-year-old female with a past medical history significant for Crohn's disease for which she is currently receiving infusion therapy with Entyvio  Due to breakthrough symptoms the patient is therapy is going to be increased from Q 8 weeks 2 Q 6 weeks  Due to the lack of reliable peripheral vascular access she has been recommended to undergo MediPort placement  On physical examination today she is a pleasant female who appears her stated age  She is awake alert and oriented  She has a competent reliable historian  She has a normal chest wall to inspection  Her lungs are clear to auscultation bilaterally  My impression is that the patient is an appropriate candidate for MediPort insertion under fluoroscopy  The technical details of the procedures well as the benefits and risks were reviewed  Specific risks related to anesthesia, bleeding, infection, neurovascular injury and 2% risk of pneumothorax requiring chest tube insertion were all explained  All questions answered to satisfaction of the patient and informed consent taken to proceed  We will attempt a left-sided placement 1st and the patient understands that if we are unsuccessful will move to the right  Diagnoses and all orders for this visit:    Crohn's disease ([facility] 75 )    Other orders  -     pantoprazole (PROTONIX) 40 mg tablet; Take 40 mg by mouth daily with breakfast   -     prednisoLONE acetate (PRED FORTE) 1 % ophthalmic suspension; INSTILL 1 DROP INTO LASERED EYE 4 TIMES A DAY FOR 2 WEEKS  -     omeprazole (PriLOSEC) 20 mg delayed release capsule; Take 20 mg by mouth daily at bedtime   -     sucralfate (CARAFATE) 1 g tablet; Take 1 g by mouth daily at bedtime  -     Multiple Vitamin (MULTI VITAMIN DAILY PO); Take by mouth daily  -     Cholecalciferol (VITAMIN D) 2000 units CAPS; Take 2,000 Units by mouth daily  -     Calcium Carbonate Antacid (TUMS PO);  Take 1,000 caplets by mouth daily  -     vedolizumab (ENTYVIO) 300 MG SOLR; Infuse 300 mg into a venous catheter 0,2,6 weeks and then every 8 weeks          Subjective:      Patient ID: Geri Sabillon is a 47 y o  female  10- Patient is here today for possible pre op Mediport for future Entyvio infusion  These infusions will be every 6 weeks as she will be having multiple (3+) needle sticks per each infusion  Colorectal screening was reviewed and she is following up with the adequate care  Anshu Allan      The following portions of the patient's history were reviewed and updated as appropriate: allergies, current medications, past family history, past medical history, past social history, past surgical history and problem list     Review of Systems   Constitutional: Negative  HENT: Negative  Eyes: Negative  Respiratory: Negative  Cardiovascular: Negative  Gastrointestinal: Positive for abdominal distention and abdominal pain  Endocrine: Negative  Genitourinary: Negative  Musculoskeletal: Negative  Skin: Negative  Allergic/Immunologic: Negative  Neurological: Negative  Hematological: Negative  Psychiatric/Behavioral: Negative  Objective:      /68   Pulse 68   Temp (!) 97 2 °F (36 2 °C) (Tympanic)   Resp 16   Ht 5' 1" (1 549 m)   Wt 49 kg (108 lb)   BMI 20 41 kg/m²           Physical Exam   Constitutional: She is oriented to person, place, and time  She appears well-developed and well-nourished  HENT:   Head: Normocephalic and atraumatic  Eyes: Pupils are equal, round, and reactive to light  Conjunctivae are normal    Neck: Normal range of motion  Neck supple  Cardiovascular: Normal rate and regular rhythm  Pulmonary/Chest: Effort normal and breath sounds normal    Abdominal: Soft  Bowel sounds are normal    Musculoskeletal: Normal range of motion  Neurological: She is alert and oriented to person, place, and time  Skin: Skin is warm and dry  Psychiatric: Her behavior is normal  Judgment and thought content normal

## 2019-10-02 NOTE — H&P (VIEW-ONLY)
Assessment/Plan:    Crohn's disease ([facility] 75 )  Patient is a pleasant 51-year-old female with a past medical history significant for Crohn's disease for which she is currently receiving infusion therapy with Entyvio  Due to breakthrough symptoms the patient is therapy is going to be increased from Q 8 weeks 2 Q 6 weeks  Due to the lack of reliable peripheral vascular access she has been recommended to undergo MediPort placement  On physical examination today she is a pleasant female who appears her stated age  She is awake alert and oriented  She has a competent reliable historian  She has a normal chest wall to inspection  Her lungs are clear to auscultation bilaterally  My impression is that the patient is an appropriate candidate for MediPort insertion under fluoroscopy  The technical details of the procedures well as the benefits and risks were reviewed  Specific risks related to anesthesia, bleeding, infection, neurovascular injury and 2% risk of pneumothorax requiring chest tube insertion were all explained  All questions answered to satisfaction of the patient and informed consent taken to proceed  We will attempt a left-sided placement 1st and the patient understands that if we are unsuccessful will move to the right  Diagnoses and all orders for this visit:    Crohn's disease ([facility] 75 )    Other orders  -     pantoprazole (PROTONIX) 40 mg tablet; Take 40 mg by mouth daily with breakfast   -     prednisoLONE acetate (PRED FORTE) 1 % ophthalmic suspension; INSTILL 1 DROP INTO LASERED EYE 4 TIMES A DAY FOR 2 WEEKS  -     omeprazole (PriLOSEC) 20 mg delayed release capsule; Take 20 mg by mouth daily at bedtime   -     sucralfate (CARAFATE) 1 g tablet; Take 1 g by mouth daily at bedtime  -     Multiple Vitamin (MULTI VITAMIN DAILY PO); Take by mouth daily  -     Cholecalciferol (VITAMIN D) 2000 units CAPS; Take 2,000 Units by mouth daily  -     Calcium Carbonate Antacid (TUMS PO);  Take 1,000 caplets by mouth daily  -     vedolizumab (ENTYVIO) 300 MG SOLR; Infuse 300 mg into a venous catheter 0,2,6 weeks and then every 8 weeks          Subjective:      Patient ID: Luis Alberto Martin is a 47 y o  female  10- Patient is here today for possible pre op Mediport for future Entyvio infusion  These infusions will be every 6 weeks as she will be having multiple (3+) needle sticks per each infusion  Colorectal screening was reviewed and she is following up with the adequate care  [address] [id number]      The following portions of the patient's history were reviewed and updated as appropriate: allergies, current medications, past family history, past medical history, past social history, past surgical history and problem list     Review of Systems   Constitutional: Negative  HENT: Negative  Eyes: Negative  Respiratory: Negative  Cardiovascular: Negative  Gastrointestinal: Positive for abdominal distention and abdominal pain  Endocrine: Negative  Genitourinary: Negative  Musculoskeletal: Negative  Skin: Negative  Allergic/Immunologic: Negative  Neurological: Negative  Hematological: Negative  Psychiatric/Behavioral: Negative  Objective:      /68   Pulse 68   Temp (!) 97 2 °F (36 2 °C) (Tympanic)   Resp 16   Ht 5' 1" (1 549 m)   Wt 49 kg (108 lb)   BMI 20 41 kg/m²           Physical Exam   Constitutional: She is oriented to person, place, and time  She appears well-developed and well-nourished  HENT:   Head: Normocephalic and atraumatic  Eyes: Pupils are equal, round, and reactive to light  Conjunctivae are normal    Neck: Normal range of motion  Neck supple  Cardiovascular: Normal rate and regular rhythm  Pulmonary/Chest: Effort normal and breath sounds normal    Abdominal: Soft  Bowel sounds are normal    Musculoskeletal: Normal range of motion  Neurological: She is alert and oriented to person, place, and time  Skin: Skin is warm and dry  Psychiatric: Her behavior is normal  Judgment and thought content normal

## 2019-10-02 NOTE — PROGRESS NOTES
Assessment/Plan:    Crohn's disease ([facility] )  Patient is a pleasant 51-year-old female with a past medical history significant for Crohn's disease for which she is currently receiving infusion therapy with Entyvio  Due to breakthrough symptoms the patient is therapy is going to be increased from Q 8 weeks 2 Q 6 weeks  Due to the lack of reliable peripheral vascular access she has been recommended to undergo MediPort placement  On physical examination today she is a pleasant female who appears her stated age  She is awake alert and oriented  She has a competent reliable historian  She has a normal chest wall to inspection  Her lungs are clear to auscultation bilaterally  My impression is that the patient is an appropriate candidate for MediPort insertion under fluoroscopy  The technical details of the procedures well as the benefits and risks were reviewed  Specific risks related to anesthesia, bleeding, infection, neurovascular injury and 2% risk of pneumothorax requiring chest tube insertion were all explained  All questions answered to satisfaction of the patient and informed consent taken to proceed  We will attempt a left-sided placement 1st and the patient understands that if we are unsuccessful will move to the right  Diagnoses and all orders for this visit:    Crohn's disease ([facility] )    Other orders  -     pantoprazole (PROTONIX) 40 mg tablet; Take 40 mg by mouth daily with breakfast   -     prednisoLONE acetate (PRED FORTE) 1 % ophthalmic suspension; INSTILL 1 DROP INTO LASERED EYE 4 TIMES A DAY FOR 2 WEEKS  -     omeprazole (PriLOSEC) 20 mg delayed release capsule; Take 20 mg by mouth daily at bedtime   -     sucralfate (CARAFATE) 1 g tablet; Take 1 g by mouth daily at bedtime  -     Multiple Vitamin (MULTI VITAMIN DAILY PO); Take by mouth daily  -     Cholecalciferol (VITAMIN D) 2000 units CAPS; Take 2,000 Units by mouth daily  -     Calcium Carbonate Antacid (TUMS PO);  Take 1,000 caplets by mouth daily  -     vedolizumab (ENTYVIO) 300 MG SOLR; Infuse 300 mg into a venous catheter 0,2,6 weeks and then every 8 weeks          Subjective:      Patient ID: Carol Almendarez is a 47 y o  female  10- Patient is here today for possible pre op Mediport for future Entyvio infusion  These infusions will be every 6 weeks as she will be having multiple (3+) needle sticks per each infusion  Colorectal screening was reviewed and she is following up with the [facility]      The following portions of the patient's history were reviewed and updated as appropriate: allergies, current medications, past family history, past medical history, past social history, past surgical history and problem list     Review of Systems   Constitutional: Negative  HENT: Negative  Eyes: Negative  Respiratory: Negative  Cardiovascular: Negative  Gastrointestinal: Positive for abdominal distention and abdominal pain  Endocrine: Negative  Genitourinary: Negative  Musculoskeletal: Negative  Skin: Negative  Allergic/Immunologic: Negative  Neurological: Negative  Hematological: Negative  Psychiatric/Behavioral: Negative  Objective:      /68   Pulse 68   Temp (!) 97 2 °F (36 2 °C) (Tympanic)   Resp 16   Ht 5' 1" (1 549 m)   Wt 49 kg (108 lb)   BMI 20 41 kg/m²          Physical Exam   Constitutional: She is oriented to person, place, and time  She appears well-developed and well-nourished  HENT:   Head: Normocephalic and atraumatic  Eyes: Pupils are equal, round, and reactive to light  Conjunctivae are normal    Neck: Normal range of motion  Neck supple  Cardiovascular: Normal rate and regular rhythm  Pulmonary/Chest: Effort normal and breath sounds normal    Abdominal: Soft  Bowel sounds are normal    Musculoskeletal: Normal range of motion  Neurological: She is alert and oriented to person, place, and time  Skin: Skin is warm and dry  Psychiatric: Her behavior is normal  Judgment and thought content normal

## 2019-10-02 NOTE — LETTER
October 2, 2019     Nury Shahid MD  [address]  [address]    Patient: Dominick Ellsworth   YOB: 1965   Date of Visit: 10/2/2019       Dear Dr Araceli Willoughby: Thank you for referring Katieclarissa Nice to me for evaluation  Below are my notes for this consultation  If you have questions, please do not hesitate to call me  I look forward to following your patient along with you  Sincerely,        Blanca Way MD        CC: No Recipients  Blanca Way MD  10/2/2019 10:12 AM  Sign at close encounter  Assessment/Plan:    Crohn's disease [facility])  Patient is a pleasant 55-year-old female with a past medical history significant for Crohn's disease for which she is currently receiving infusion therapy with Entyvio  Due to breakthrough symptoms the patient is therapy is going to be increased from Q 8 weeks 2 Q 6 weeks  Due to the lack of reliable peripheral vascular access she has been recommended to undergo MediPort placement  On physical examination today she is a pleasant female who appears her stated age  She is awake alert and oriented  She has a competent reliable historian  She has a normal chest wall to inspection  Her lungs are clear to auscultation bilaterally  My impression is that the patient is an appropriate candidate for MediPort insertion under fluoroscopy  The technical details of the procedures well as the benefits and risks were reviewed  Specific risks related to anesthesia, bleeding, infection, neurovascular injury and 2% risk of pneumothorax requiring chest tube insertion were all explained  All questions answered to satisfaction of the patient and informed consent taken to proceed  We will attempt a left-sided placement 1st and the patient understands that if we are unsuccessful will move to the right         Diagnoses and all orders for this visit:    Crohn's disease ([address] )    Other orders  -     pantoprazole (PROTONIX) 40 mg tablet; Take 40 mg by mouth daily with breakfast   -     prednisoLONE acetate (PRED FORTE) 1 % ophthalmic suspension; INSTILL 1 DROP INTO LASERED EYE 4 TIMES A DAY FOR 2 WEEKS  -     omeprazole (PriLOSEC) 20 mg delayed release capsule; Take 20 mg by mouth daily at bedtime   -     sucralfate (CARAFATE) 1 g tablet; Take 1 g by mouth daily at bedtime  -     Multiple Vitamin (MULTI VITAMIN DAILY PO); Take by mouth daily  -     Cholecalciferol (VITAMIN D) 2000 units CAPS; Take 2,000 Units by mouth daily  -     Calcium Carbonate Antacid (TUMS PO); Take 1,000 caplets by mouth daily  -     vedolizumab (ENTYVIO) 300 MG SOLR; Infuse 300 mg into a venous catheter 0,2,6 weeks and then every 8 weeks          Subjective:      Patient ID: Cristine Zamorano is a 47 y o  female  10- Patient is here today for possible pre op Mediport for future Entyvio infusion  These infusions will be every 6 weeks as she will be having multiple (3+) needle sticks per each infusion  Colorectal screening was reviewed and she is following up with the [facility]  Sandra Tinsley      The following portions of the patient's history were reviewed and updated as appropriate: allergies, current medications, past family history, past medical history, past social history, past surgical history and problem list     Review of Systems   Constitutional: Negative  HENT: Negative  Eyes: Negative  Respiratory: Negative  Cardiovascular: Negative  Gastrointestinal: Positive for abdominal distention and abdominal pain  Endocrine: Negative  Genitourinary: Negative  Musculoskeletal: Negative  Skin: Negative  Allergic/Immunologic: Negative  Neurological: Negative  Hematological: Negative  Psychiatric/Behavioral: Negative            Objective:      /68   Pulse 68   Temp (!) 97 2 °F (36 2 °C) (Tympanic)   Resp 16   Ht 5' 1" (1 549 m)   Wt 49 kg (108 lb)   BMI 20 41 kg/m²           Physical Exam   Constitutional: She is oriented to person, place, and time  She appears well-developed and well-nourished  HENT:   Head: Normocephalic and atraumatic  Eyes: Pupils are equal, round, and reactive to light  Conjunctivae are normal    Neck: Normal range of motion  Neck supple  Cardiovascular: Normal rate and regular rhythm  Pulmonary/Chest: Effort normal and breath sounds normal    Abdominal: Soft  Bowel sounds are normal    Musculoskeletal: Normal range of motion  Neurological: She is alert and oriented to person, place, and time  Skin: Skin is warm and dry     Psychiatric: Her behavior is normal  Judgment and thought content normal

## 2019-10-03 ENCOUNTER — HOSPITAL ENCOUNTER (OUTPATIENT)
Dept: NON INVASIVE DIAGNOSTICS | Facility: HOSPITAL | Age: 54
Discharge: HOME | End: 2019-10-03
Attending: SURGERY
Payer: MEDICARE

## 2019-10-03 ENCOUNTER — APPOINTMENT (OUTPATIENT)
Dept: LAB | Facility: HOSPITAL | Age: 54
End: 2019-10-03
Attending: SURGERY
Payer: MEDICARE

## 2019-10-03 DIAGNOSIS — K50.90 CROHN'S DISEASE (HCC): ICD-10-CM

## 2019-10-03 LAB
ANION GAP SERPL CALCULATED.3IONS-SCNC: 7 MMOL/L (ref 4–13)
ATRIAL RATE: 55 BPM
BASOPHILS # BLD AUTO: 0 THOUSANDS/ΜL (ref 0–0.1)
BASOPHILS NFR BLD AUTO: 1 % (ref 0–2)
BUN SERPL-MCNC: 10 MG/DL (ref 7–25)
CALCIUM SERPL-MCNC: 9.9 MG/DL (ref 8.6–10.5)
CHLORIDE SERPL-SCNC: 99 MMOL/L (ref 98–107)
CO2 SERPL-SCNC: 33 MMOL/L (ref 21–31)
CREAT SERPL-MCNC: 0.81 MG/DL (ref 0.6–1.2)
EOSINOPHIL # BLD AUTO: 0.1 THOUSAND/ΜL (ref 0–0.61)
EOSINOPHIL NFR BLD AUTO: 2 % (ref 0–5)
ERYTHROCYTE [DISTWIDTH] IN BLOOD BY AUTOMATED COUNT: 13.1 % (ref 11.5–14.5)
GFR SERPL CREATININE-BSD FRML MDRD: 83 ML/MIN/1.73SQ M
GLUCOSE SERPL-MCNC: 86 MG/DL (ref 65–99)
HCT VFR BLD AUTO: 41.9 % (ref 42–47)
HGB BLD-MCNC: 13.7 G/DL (ref 12–16)
LYMPHOCYTES # BLD AUTO: 2.2 THOUSANDS/ΜL (ref 0.6–4.47)
LYMPHOCYTES NFR BLD AUTO: 35 % (ref 21–51)
MCH RBC QN AUTO: 30.6 PG (ref 26–34)
MCHC RBC AUTO-ENTMCNC: 32.7 G/DL (ref 31–37)
MCV RBC AUTO: 94 FL (ref 81–99)
MONOCYTES # BLD AUTO: 0.6 THOUSAND/ΜL (ref 0.17–1.22)
MONOCYTES NFR BLD AUTO: 10 % (ref 2–12)
NEUTROPHILS # BLD AUTO: 3.4 THOUSANDS/ΜL (ref 1.4–6.5)
NEUTS SEG NFR BLD AUTO: 53 % (ref 42–75)
PLATELET # BLD AUTO: 221 THOUSANDS/UL (ref 149–390)
PMV BLD AUTO: 10.3 FL (ref 8.6–11.7)
POTASSIUM SERPL-SCNC: 4.2 MMOL/L (ref 3.5–5.5)
PR INTERVAL: 146 MS
QRS AXIS: 57 DEGREES
QRSD INTERVAL: 72 MS
QT INTERVAL: 432 MS
QTC INTERVAL: 413 MS
RBC # BLD AUTO: 4.46 MILLION/UL (ref 3.9–5.2)
SODIUM SERPL-SCNC: 139 MMOL/L (ref 134–143)
T WAVE AXIS: 55 DEGREES
VENTRICULAR RATE: 55 BPM
WBC # BLD AUTO: 6.3 THOUSAND/UL (ref 4.8–10.8)

## 2019-10-03 PROCEDURE — 36415 COLL VENOUS BLD VENIPUNCTURE: CPT

## 2019-10-03 PROCEDURE — 93010 ELECTROCARDIOGRAM REPORT: CPT | Performed by: INTERNAL MEDICINE

## 2019-10-03 PROCEDURE — 80048 BASIC METABOLIC PNL TOTAL CA: CPT

## 2019-10-03 PROCEDURE — 93005 ELECTROCARDIOGRAM TRACING: CPT

## 2019-10-03 PROCEDURE — 85025 COMPLETE CBC W/AUTO DIFF WBC: CPT

## 2019-10-04 ENCOUNTER — TELEPHONE (OUTPATIENT)
Dept: SURGERY | Facility: CLINIC | Age: 54
End: 2019-10-04

## 2019-10-04 NOTE — TELEPHONE ENCOUNTER
I spoke with the patient and gave her the results of her blood work and EKG  She is good to go for the procedure scheduled for the 10th

## 2019-10-04 NOTE — TELEPHONE ENCOUNTER
Patient had bloodwork & EKG done for her upcoming surgery  Can you please take a look at her testing and let her know if she is good to go for her Mediport insertion on 10/10/2019? She states that it is ok to leave a message on her home phone

## 2019-10-07 ENCOUNTER — ANESTHESIA EVENT (OUTPATIENT)
Dept: PERIOP | Facility: HOSPITAL | Age: 54
End: 2019-10-07
Payer: MEDICARE

## 2019-10-07 NOTE — PRE-PROCEDURE INSTRUCTIONS
Pre-Surgery Instructions:   Medication Instructions    ADVANCED EVENING PRIMROSE OIL PO Instructed patient per Anesthesia Guidelines  LD 10/7    Black Cohosh 40 MG CAPS Instructed patient per Anesthesia Guidelines  LD 10/7    Calcium Carbonate Antacid (TUMS PO) Instructed patient per Anesthesia Guidelines   Cholecalciferol (VITAMIN D) 2000 units CAPS Instructed patient per Anesthesia Guidelines   estradiol (ESTRACE) 0 5 MG tablet Instructed patient per Anesthesia Guidelines   levothyroxine (SYNTHROID) 75 mcg tablet Instructed patient per Anesthesia Guidelines  take DOS    Multiple Vitamin (MULTI VITAMIN DAILY PO) Instructed patient per Anesthesia Guidelines  LD 10/7    omeprazole (PriLOSEC) 20 mg delayed release capsule Instructed patient per Anesthesia Guidelines   pantoprazole (PROTONIX) 40 mg tablet Instructed patient per Anesthesia Guidelines  LD 10/9    pravastatin (PRAVACHOL) 20 mg tablet Instructed patient per Anesthesia Guidelines   vedolizumab (ENTYVIO) 300 MG SOLR Instructed patient per Anesthesia Guidelines  My Surgical Experience    The following information was developed to assist you to prepare for your operation  What do I need to do before coming to the hospital?   Arrange for a responsible person to drive you to and from the hospital    Arrange care for your children at home  Children are not allowed in the recovery areas of the hospital   Plan to wear clothing that is easy to put on and take off  If you are having shoulder surgery, wear a shirt that buttons or zippers in the front  Bathing  o Shower the evening before and the morning of your surgery with an antibacterial soap   Please refer to the Pre Op Showering Instructions for Surgery Patients Sheet   o Remove nail polish and all body piercing jewelry  o Do not shave any body part for at least 24 hours before surgery-this includes face, arms, legs and upper body  Food  o Nothing to eat or drink after midnight the night before your surgery  This includes candy and chewing gum  o Exception: If your surgery is after 12:00pm (noon), you may have clear liquids such as 7-Up®, ginger ale, apple or cranberry juice, Jell-O®, water, or clear broth until 8:00 am  o Do not drink milk or juice with pulp on the morning before surgery  o Do not drink alcohol 24 hours before surgery  Medicine  o Follow instructions you received from your surgeon about which medicines you may take on the day of surgery  o If instructed to take medicine on the morning of surgery, take pills with just a small sip of water  Call your prescribing doctor for specific infroamtion on what to do if you take insulin    What should I bring to the hospital?    Bring:  Fayetta [employer] or a walker, if you have them, for foot or knee surgery   A list of the daily medicines, vitamins, minerals, herbals and nutritional supplements you take  Include the dosages of medicines and the time you take them each day   Glasses, dentures or hearing aids   Minimal clothing; you will be wearing hospital sleepwear   Photo ID; required to verify your identity   If you have a Living Will or Power of , bring a copy of the documents   If you have an ostomy, bring an extra pouch and any supplies you use    Do not bring   Medicines or inhalers   Money, valuables or jewelry    What other information should I know about the day of surgery?  Notify your surgeons if you develop a cold, sore throat, cough, fever, rash or any other illness   Report to the Ambulatory Surgical/Same Day Surgery Unit   You will be instructed to stop at Registration only if you have not been pre-registered   Inform your  fi they do not stay that they will be asked by the staff to leave a phone number where they can be reached   Be available to be reached before surgery   In the event the operating room schedule changes, you may be asked to come in earlier or later than expected    *It is important to tell your doctor and others involved in your health care if you are taking or have been taking any non-prescription drugs, vitamins, minerals, herbals or other nutritional supplements   Any of these may interact with some food or medicines and cause a reaction

## 2019-10-07 NOTE — ANESTHESIA PREPROCEDURE EVALUATION
Review of Systems/Medical History  Patient summary reviewed  Chart reviewed  History of anesthetic complications PONV    Cardiovascular  Hyperlipidemia,    Pulmonary       GI/Hepatic    GERD ,  Hiatal hernia,             Endo/Other  History of thyroid disease ,   Comment: Thyroid CA thyroidectomy, ponv    GYN       Hematology   Musculoskeletal       Neurology   Psychology         Lab Results   Component Value Date    WBC 6 30 10/03/2019    HGB 13 7 10/03/2019    HCT 41 9 (L) 10/03/2019    MCV 94 10/03/2019     10/03/2019     Lab Results   Component Value Date    CALCIUM 9 9 10/03/2019    K 4 2 10/03/2019    CO2 33 (H) 10/03/2019    CL 99 10/03/2019    BUN 10 10/03/2019    CREATININE 0 81 10/03/2019     No results found for: INR, PROTIME  No results found for: PTT    Physical Exam    Airway    Mallampati score: III  TM Distance: >3 FB  Neck ROM: limited     Dental   No notable dental hx     Cardiovascular  Rhythm: regular, Rate: normal,     Pulmonary  Breath sounds clear to auscultation,     Other Findings        Anesthesia Plan  ASA Score- 2     Anesthesia Type- IV sedation with anesthesia with ASA Monitors  Additional Monitors:   Airway Plan:         Plan Factors-    Induction- intravenous  Postoperative Plan-     Informed Consent- Anesthetic plan and risks discussed with patient

## 2019-10-10 ENCOUNTER — APPOINTMENT (OUTPATIENT)
Dept: RADIOLOGY | Facility: HOSPITAL | Age: 54
End: 2019-10-10
Payer: MEDICARE

## 2019-10-10 ENCOUNTER — ANESTHESIA (OUTPATIENT)
Dept: PERIOP | Facility: HOSPITAL | Age: 54
End: 2019-10-10
Payer: MEDICARE

## 2019-10-10 ENCOUNTER — HOSPITAL ENCOUNTER (OUTPATIENT)
Facility: HOSPITAL | Age: 54
Setting detail: OUTPATIENT SURGERY
Discharge: HOME | End: 2019-10-10
Attending: SURGERY | Admitting: SURGERY
Payer: MEDICARE

## 2019-10-10 DIAGNOSIS — K50.919 CROHN'S DISEASE WITH COMPLICATION, UNSPECIFIED GASTROINTESTINAL TRACT LOCATION (HCC): Primary | ICD-10-CM

## 2019-10-10 PROCEDURE — 77001 FLUOROGUIDE FOR VEIN DEVICE: CPT | Performed by: SURGERY

## 2019-10-10 PROCEDURE — C1788 PORT, INDWELLING, IMP: HCPCS | Performed by: SURGERY

## 2019-10-10 PROCEDURE — 76000 FLUOROSCOPY <1 HR PHYS/QHP: CPT

## 2019-10-10 PROCEDURE — 36561 INSERT TUNNELED CV CATH: CPT | Performed by: PHYSICIAN ASSISTANT

## 2019-10-10 PROCEDURE — 71045 X-RAY EXAM CHEST 1 VIEW: CPT

## 2019-10-10 PROCEDURE — 36561 INSERT TUNNELED CV CATH: CPT | Performed by: SURGERY

## 2019-10-10 DEVICE — POWERPORT M.R.I. IMPLANTABLE PORT WITH ATTACHABLE 8F CHRONOFLEX OPEN-ENDED SINGLE-LUMEN VENOUS CATHETER INTERMEDIATE KIT  (WITHOUT SUTURE PLUGS)
Type: IMPLANTABLE DEVICE | Status: FUNCTIONAL
Brand: POWERPORT M.R.I., CHRONOFLEX

## 2019-10-10 RX ORDER — LEVOFLOXACIN 5 MG/ML
500 INJECTION, SOLUTION INTRAVENOUS ONCE
Status: COMPLETED | OUTPATIENT
Start: 2019-10-10 | End: 2019-10-10

## 2019-10-10 RX ORDER — OXYCODONE HYDROCHLORIDE AND ACETAMINOPHEN 5; 325 MG/1; MG/1
1 TABLET ORAL EVERY 8 HOURS PRN
Qty: 3 TABLET | Refills: 0 | Status: SHIPPED | OUTPATIENT
Start: 2019-10-10 | End: 2019-10-20

## 2019-10-10 RX ORDER — LEVALBUTEROL INHALATION SOLUTION 0.63 MG/3ML
0.63 SOLUTION RESPIRATORY (INHALATION) EVERY 8 HOURS PRN
Status: DISCONTINUED | OUTPATIENT
Start: 2019-10-10 | End: 2019-10-11 | Stop reason: HOSPADM

## 2019-10-10 RX ORDER — FENTANYL CITRATE/PF 50 MCG/ML
25 SYRINGE (ML) INJECTION
Status: DISCONTINUED | OUTPATIENT
Start: 2019-10-10 | End: 2019-10-11 | Stop reason: HOSPADM

## 2019-10-10 RX ORDER — FENTANYL CITRATE 50 UG/ML
INJECTION, SOLUTION INTRAMUSCULAR; INTRAVENOUS AS NEEDED
Status: DISCONTINUED | OUTPATIENT
Start: 2019-10-10 | End: 2019-10-10 | Stop reason: SURG

## 2019-10-10 RX ORDER — OXYCODONE HYDROCHLORIDE AND ACETAMINOPHEN 5; 325 MG/1; MG/1
2 TABLET ORAL EVERY 4 HOURS PRN
Status: DISCONTINUED | OUTPATIENT
Start: 2019-10-10 | End: 2019-10-11 | Stop reason: HOSPADM

## 2019-10-10 RX ORDER — OXYCODONE HYDROCHLORIDE AND ACETAMINOPHEN 5; 325 MG/1; MG/1
1 TABLET ORAL EVERY 4 HOURS PRN
Status: DISCONTINUED | OUTPATIENT
Start: 2019-10-10 | End: 2019-10-11 | Stop reason: HOSPADM

## 2019-10-10 RX ORDER — ONDANSETRON 2 MG/ML
4 INJECTION INTRAMUSCULAR; INTRAVENOUS ONCE
Status: COMPLETED | OUTPATIENT
Start: 2019-10-10 | End: 2019-10-10

## 2019-10-10 RX ORDER — ONDANSETRON 2 MG/ML
4 INJECTION INTRAMUSCULAR; INTRAVENOUS EVERY 6 HOURS PRN
Status: DISCONTINUED | OUTPATIENT
Start: 2019-10-10 | End: 2019-10-11 | Stop reason: HOSPADM

## 2019-10-10 RX ORDER — LIDOCAINE HYDROCHLORIDE AND EPINEPHRINE 10; 10 MG/ML; UG/ML
INJECTION, SOLUTION INFILTRATION; PERINEURAL AS NEEDED
Status: DISCONTINUED | OUTPATIENT
Start: 2019-10-10 | End: 2019-10-10 | Stop reason: HOSPADM

## 2019-10-10 RX ORDER — DEXAMETHASONE SODIUM PHOSPHATE 4 MG/ML
INJECTION, SOLUTION INTRA-ARTICULAR; INTRALESIONAL; INTRAMUSCULAR; INTRAVENOUS; SOFT TISSUE AS NEEDED
Status: DISCONTINUED | OUTPATIENT
Start: 2019-10-10 | End: 2019-10-10 | Stop reason: SURG

## 2019-10-10 RX ORDER — ACETAMINOPHEN 325 MG/1
650 TABLET ORAL EVERY 6 HOURS PRN
Status: DISCONTINUED | OUTPATIENT
Start: 2019-10-10 | End: 2019-10-11 | Stop reason: HOSPADM

## 2019-10-10 RX ORDER — SODIUM CHLORIDE 9 MG/ML
INJECTION, SOLUTION INTRAVENOUS AS NEEDED
Status: DISCONTINUED | OUTPATIENT
Start: 2019-10-10 | End: 2019-10-10 | Stop reason: HOSPADM

## 2019-10-10 RX ORDER — SODIUM CHLORIDE, SODIUM LACTATE, POTASSIUM CHLORIDE, CALCIUM CHLORIDE 600; 310; 30; 20 MG/100ML; MG/100ML; MG/100ML; MG/100ML
75 INJECTION, SOLUTION INTRAVENOUS CONTINUOUS
Status: DISCONTINUED | OUTPATIENT
Start: 2019-10-10 | End: 2019-10-11 | Stop reason: HOSPADM

## 2019-10-10 RX ORDER — LIDOCAINE HYDROCHLORIDE 20 MG/ML
INJECTION, SOLUTION EPIDURAL; INFILTRATION; INTRACAUDAL; PERINEURAL AS NEEDED
Status: DISCONTINUED | OUTPATIENT
Start: 2019-10-10 | End: 2019-10-10 | Stop reason: SURG

## 2019-10-10 RX ORDER — PROPOFOL 10 MG/ML
INJECTION, EMULSION INTRAVENOUS AS NEEDED
Status: DISCONTINUED | OUTPATIENT
Start: 2019-10-10 | End: 2019-10-10 | Stop reason: SURG

## 2019-10-10 RX ORDER — MIDAZOLAM HYDROCHLORIDE 1 MG/ML
INJECTION INTRAMUSCULAR; INTRAVENOUS AS NEEDED
Status: DISCONTINUED | OUTPATIENT
Start: 2019-10-10 | End: 2019-10-10 | Stop reason: SURG

## 2019-10-10 RX ORDER — SODIUM CHLORIDE, SODIUM LACTATE, POTASSIUM CHLORIDE, CALCIUM CHLORIDE 600; 310; 30; 20 MG/100ML; MG/100ML; MG/100ML; MG/100ML
125 INJECTION, SOLUTION INTRAVENOUS CONTINUOUS
Status: DISCONTINUED | OUTPATIENT
Start: 2019-10-10 | End: 2019-10-10

## 2019-10-10 RX ADMIN — LEVOFLOXACIN: 5 INJECTION, SOLUTION INTRAVENOUS at 08:22

## 2019-10-10 RX ADMIN — MIDAZOLAM HYDROCHLORIDE 2 MG: 1 INJECTION, SOLUTION INTRAMUSCULAR; INTRAVENOUS at 08:45

## 2019-10-10 RX ADMIN — ONDANSETRON 4 MG: 2 INJECTION INTRAMUSCULAR; INTRAVENOUS at 07:35

## 2019-10-10 RX ADMIN — SODIUM CHLORIDE, POTASSIUM CHLORIDE, SODIUM LACTATE AND CALCIUM CHLORIDE 125 ML/HR: 600; 310; 30; 20 INJECTION, SOLUTION INTRAVENOUS at 07:29

## 2019-10-10 RX ADMIN — DEXAMETHASONE SODIUM PHOSPHATE 4 MG: 4 INJECTION, SOLUTION INTRAMUSCULAR; INTRAVENOUS at 09:12

## 2019-10-10 RX ADMIN — PROPOFOL 40 MG: 10 INJECTION, EMULSION INTRAVENOUS at 09:00

## 2019-10-10 RX ADMIN — PROPOFOL 20 MG: 10 INJECTION, EMULSION INTRAVENOUS at 08:44

## 2019-10-10 RX ADMIN — FENTANYL CITRATE 50 MCG: 50 INJECTION INTRAMUSCULAR; INTRAVENOUS at 08:41

## 2019-10-10 RX ADMIN — PROPOFOL 40 MG: 10 INJECTION, EMULSION INTRAVENOUS at 08:52

## 2019-10-10 RX ADMIN — PROPOFOL 40 MG: 10 INJECTION, EMULSION INTRAVENOUS at 08:56

## 2019-10-10 RX ADMIN — LIDOCAINE HYDROCHLORIDE 100 MG: 20 INJECTION, SOLUTION EPIDURAL; INFILTRATION; INTRACAUDAL; PERINEURAL at 08:41

## 2019-10-10 NOTE — DISCHARGE INSTR - AVS FIRST PAGE
[facility]    Discharge Instructions  Light activity for 2 weeks  Surgical glue will fall off with time  Take discharge medications as prescribed  Notify our office for nausea, vomiting, fever, diarrhea, chest pain, trouble breathing  Follow up in our office in 2 weeks or sooner if needed  Call with additional questions or concerns [phone]  Monitor for signs of infection: redness, warmth, swelling, drainage, fevers  Monitor for respiratory symptoms: shortness of breath, chest pain, cough  Use Tylenol or Motrin as needed for pain  Use Percocet as needed for breakthrough pain  Your port is able to be used immediately  Notify your doctor that you now have a port so that they can arrange for treatments  If your port is not being used, it must be flushed with saline every 1-3 months  If you have any issues with the port let us know immediately

## 2019-10-10 NOTE — DISCHARGE INSTRUCTIONS
Oxycodone/Acetaminophen (By mouth)   Acetaminophen (a-laco-o-MIN-oh-fen), Oxycodone Hydrochloride (kn-v-VAL-done eusebio-droe-KLOR-fariba)  Treats moderate to moderately severe pain  This medicine is a narcotic pain reliever  Brand Name(s): Endocet, Percocet, Primlev, Xartemis XR   There may be other brand names for this medicine  When This Medicine Should Not Be Used: This medicine is not right for everyone  Do not use it if you had an allergic reaction to acetaminophen or oxycodone, or if you have serious breathing problems or paralytic ileus  How to Use This Medicine:   Capsule, Liquid, Tablet, Long Acting Tablet  · Your doctor will tell you how much medicine to use  Do not use more than directed  · An overdose can be dangerous  Follow directions carefully so you do not get too much medicine at one time  · Oral liquid: Measure the oral liquid medicine with a marked measuring spoon, oral syringe, or medicine cup  · Swallow the extended-release tablet whole  Do not crush, break, or chew it  Do not lick or wet the tablet before placing it in your mouth  Do not give this medicine through a feeding tube  · This medicine should come with a Medication Guide  Ask your pharmacist for a copy if you do not have one  · Missed dose: If you miss a dose of this medicine, skip the missed dose and go back to your regular dosing schedule  Do not double doses  · Store the medicine in a closed container at room temperature, away from heat, moisture, and direct light  Ask your pharmacist about the best way to dispose of medicine you do not use  Drugs and Foods to Avoid:   Ask your doctor or pharmacist before using any other medicine, including over-the-counter medicines, vitamins, and herbal products  · Do not use Xartemis XR if you are using or have used an MAO inhibitor in the past 14 days  · Some medicines can affect how this medicine works   Tell your doctor if you are using any of the following:   ¨ Carbamazepine, erythromycin, ketoconazole, lamotrigine, mirtazapine, naltrexone, phenytoin, propranolol, rifampin, ritonavir, tramadol, trazodone, or zidovudine  ¨ Birth control pills  ¨ Diuretic (water pill)  ¨ Medicine to treat depression  ¨ Phenothiazine medicine  ¨ Triptan medicine to treat migraine headaches  · Do not drink alcohol while you are using this medicine  Acetaminophen can damage your liver, and alcohol can increase this risk  Do not take acetaminophen without asking your doctor if you have 3 or more drinks of alcohol every day  · Tell your doctor if you use anything else that makes you sleepy  Some examples are allergy medicine, narcotic pain medicine, and alcohol  Tell your doctor if you are using buprenorphine, butorphanol, nalbuphine, pentazocine, a benzodiazepine, or a muscle relaxer  Warnings While Using This Medicine:   · Tell your doctor if you are pregnant or breastfeeding, or if you have kidney disease, liver disease, heart disease, low blood pressure, breathing problems or lung disease (such as asthma, COPD), thyroid problems, [place] disease, pancreas or gallbladder problems, prostate problems, trouble urinating, or a stomach problems, or a history of head injury or brain damage, seizures, or alcohol or drug abuse  Tell your doctor if you are allergic to codeine  · This medicine may cause the following problems:  ¨ High risk of overdose, which can lead to death  ¨ Respiratory depression (serious breathing problem that can be life-threatening)  ¨ Liver problems  ¨ Serious skin reactions  ¨ Serotonin syndrome (when used with certain medicines)  · This medicine may make you dizzy or drowsy  Do not drive or do anything that could be dangerous until you know how this medicine affects you  Sit or lie down if you feel dizzy  Stand up carefully  · This medicine contains acetaminophen   Read the labels of all other medicines you are using to see if they also contain acetaminophen, or ask your doctor or pharmacist  Mikey Arshad not use more than 4 grams (4,000 milligrams) total of acetaminophen in one day  · This medicine can be habit-forming  Do not use more than your prescribed dose  Call your doctor if you think your medicine is not working  · Do not stop using this medicine suddenly  Your doctor will need to slowly decrease your dose before you stop it completely  · This medicine could cause infertility  Talk with your doctor before using this medicine if you plan to have children  · This medicine may cause constipation, especially with long-term use  Ask your doctor if you should use a laxative to prevent and treat constipation  · Keep all medicine out of the reach of children  Never share your medicine with anyone  Possible Side Effects While Using This Medicine:   Call your doctor right away if you notice any of these side effects:  · Allergic reaction: Itching or hives, swelling in your face or hands, swelling or tingling in your mouth or throat, chest tightness, trouble breathing  · Anxiety, restlessness, fast heartbeat, fever, muscle spasms, twitching, diarrhea, seeing or hearing things that are not there  · Blistering, peeling, red skin rash  · Blue lips, fingernails, or skin  · Dark urine or pale stools, loss of appetite, stomach pain, yellow skin or eyes  · Extreme weakness, shallow breathing, uneven heartbeat, seizures, sweating, or cold or clammy skin  · Severe confusion, lightheadedness, dizziness, or fainting  · Severe constipation, nausea, or vomiting  · Trouble breathing or slow breathing  If you notice these less serious side effects, talk with your doctor:   · Headache  · Mild constipation, nausea, or vomiting  · Mild sleepiness or drowsiness  If you notice other side effects that you think are caused by this medicine, tell your doctor  Call your doctor for medical advice about side effects   You may report side effects to FDA at 8-542-FDA-5849  © 2017 [address] Information is for End User's use only and may not be sold, redistributed or otherwise used for commercial purposes  The above information is an  only  It is not intended as medical advice for individual conditions or treatments  Talk to your doctor, nurse or pharmacist before following any medical regimen to see if it is safe and effective for you  Implanted Venous Access Port   WHAT YOU NEED TO KNOW:   What is an implanted venous access port? An implanted venous access port is a device used to give treatments and take blood  It may also be called a central venous access device (CVAD)  The port is a small container that is placed under your skin, usually in your upper chest  A port can also be placed in your arm or abdomen  The port is attached to a catheter that enters a large vein  Why do I need an implanted venous access port? · You need long-term IV treatments , such as chemotherapy, antibiotics, or a bone marrow transplant  A port allows you to get these treatments at home, in a clinic, or in the hospital     · You need frequent blood transfusions or blood tests  You can receive blood transfusions through your port  Healthcare providers can also collect blood samples  · Your veins need to be protected  Certain types of IV medicines, such as chemotherapy, can damage your veins and skin  You may get a port if your veins are small or damaged  Your skin and veins are protected when you get the medicines through a port instead  CARE AGREEMENT:   You have the right to help plan your care  Learn about your health condition and how it may be treated  Discuss treatment options with your caregivers to decide what care you want to receive  You always have the right to refuse treatment  The above information is an  only  It is not intended as medical advice for individual conditions or treatments   Talk to your doctor, nurse or pharmacist before following any medical regimen to see if it is safe and effective for you  © 2017 [facility] Information is for End User's use only and may not be sold, redistributed or otherwise used for commercial purposes  All illustrations and images included in CareNotes® are the copyrighted property of A D A M , Inc  or Higinio Morris

## 2019-10-10 NOTE — ANESTHESIA POSTPROCEDURE EVALUATION
Post-Op Assessment Note    CV Status:  Stable  Pain Score: 0    Pain management: adequate     Mental Status:  Alert   Hydration Status:  Stable   PONV Controlled:  None   Airway Patency:  Patent   Post Op Vitals Reviewed: Yes      Staff: Anesthesiologist           BP   128/68   Temp   97 0   Pulse  75   Resp   20   SpO2   100

## 2019-10-10 NOTE — INTERVAL H&P NOTE
H&P reviewed  After examining the patient I find no changes in the patients condition since the H&P had been written      Vitals:    10/10/19 0712   BP: 142/81   Pulse: 55   Resp: 20   Temp: 98 °F (36 7 °C)   SpO2: 100%

## 2019-10-10 NOTE — OP NOTE
OPERATIVE REPORT  PATIENT NAME: Brittney Mabry    :  1965  MRN: [id number]  Pt Location: [address] [place] [address] OR ROOM 02    SURGERY DATE: 10/10/2019    Surgeon(s) and Role:     Heidy Mascorro MD - Primary     * Camron Feliciano PA-C - Assisting  The PA was necessary to provide expert assistance; i e  in the form of providing optimal exposure with retraction, suturing, and assistance with dissection in order to perform the most efficient operation and in order to optimize patient safety in the abscence of a qualified surgical resident  Preop Diagnosis:  Crohn's disease ([facility] [address] ) [K50 90]    Post-Op Diagnosis Codes:     * Crohn's disease ([facility] [address] ) [K50 90]    Procedure(s) (LRB):  INSERTION VENOUS PORT (PORT-A-CATH) (Left)    Specimen(s):  * No specimens in log *    Estimated Blood Loss:   Minimal    Drains:  * No LDAs found *    Anesthesia Type:   IV Sedation with Anesthesia    Operative Indications:  Crohn's disease ([facility] [address] ) [K50 90]  Patient is a pleasant 15-year-old female presenting with Crohn's disease requiring frequent infusion therapy for which vascular access now is required  Operative Findings:  Left subclavian vein MediPort was placed with a single stick  Seldinger technique used  Fluoroscopy used to confirm wire position  An [address] [facility] [place] MediPort was placed without event  The length of the catheter visualized fluoroscopically and appeared in proper position free from kinks  It was accessed  It aspirated blood and flushed saline without resistance  Heparinized saline instilled in the catheter lumen at the procedures conclusion conclusion  Chest x-ray obtained recovery  Complications:   None    Procedure and Technique:  The patient was taken to the operating room  Time-out performed  Skin prepped and draped  The skin infiltrated with 1% lidocaine local anesthesia with epinephrine  The  vein cannulated percutaneously  Using Seldinger technique wire advanced and needle removed    Proper position of the wire confirmed fluoroscopically  The site for the MediPort was marked and infiltrated with 1% lidocaine local anesthesia with epinephrine  Skin incised  Hemostasis ensured with electrocautery  Catheter tunneled into its proper position  It was cut to length and connected to the MediPort  The MediPort was placed in the subcutaneous pocket  Under direct fluoroscopic guidance the dilator and introducer advanced over the wire  Wire and dilator removed  Catheter advanced through the peel-away introducer and the peel-away introducer removed  The length of the MediPort inspected fluoroscopically  It was found to be in good position  No kinks were noted  The MediPort was then accessed with the [facility] needle  It aspirated blood and flushed saline without resistance  Heparinized saline instilled in the catheter lumen  The needle removed  Wounds closed with subcuticular 4 Monocryl suture  Wounds dressed  The patient taken to recovery in stable condition where a chest x-ray was obtained  The patient tolerated the procedure well       I was present for the entire procedure    Patient Disposition:  PACU     SIGNATURE: Antonieta Morales MD  DATE: October 10, 2019  TIME: 9:12 AM

## 2019-10-11 VITALS
HEIGHT: 61 IN | HEART RATE: 70 BPM | DIASTOLIC BLOOD PRESSURE: 62 MMHG | WEIGHT: 108 LBS | TEMPERATURE: 98 F | OXYGEN SATURATION: 100 % | BODY MASS INDEX: 20.39 KG/M2 | RESPIRATION RATE: 16 BRPM | SYSTOLIC BLOOD PRESSURE: 111 MMHG

## 2019-10-15 ENCOUNTER — TELEPHONE (OUTPATIENT)
Dept: SURGERY | Facility: CLINIC | Age: 54
End: 2019-10-15

## 2019-10-15 NOTE — TELEPHONE ENCOUNTER
Called patient post Medibeto 10-  Patient states that she is doing good and has her first infusion treatment November 1, 2019  Will call office will any questions or concerns

## 2019-10-18 ENCOUNTER — OFFICE VISIT (OUTPATIENT)
Dept: SURGERY | Facility: CLINIC | Age: 54
End: 2019-10-18

## 2019-10-18 DIAGNOSIS — Z45.2 ENCOUNTER FOR CARE RELATED TO VASCULAR ACCESS PORT: Primary | ICD-10-CM

## 2019-10-18 PROCEDURE — 99024 POSTOP FOLLOW-UP VISIT: CPT | Performed by: PHYSICIAN ASSISTANT

## 2019-10-18 NOTE — ASSESSMENT & PLAN NOTE
Patient well after left subclavian MediPort insertion  Has first infusions scheduled for mid November  Has developed minimal skin separation at the incision without signs of obvious infection on exam   Advised local wound care only with daily cleanse is with soapy water, topical antibiotic ointment and bandage  Signs of infection were reviewed and she was instructed to call to begin antibiotic therapy at that time  Copy of the op note provided and reviewed  We will see her back in 1 week to recheck the wound  Questions answered, follow-up arranged

## 2019-10-18 NOTE — PROGRESS NOTES
Post-Op Note - General Surgery   Cristine Zamorano 47 y o  female MRN: [id number]  Encounter: [id number]    Assessment/Plan    Encounter for care related to vascular access port  Patient well after left subclavian MediPort insertion  Has first infusions scheduled for mid November  Has developed minimal skin separation at the incision without signs of obvious infection on exam   Advised local wound care only with daily cleanse is with soapy water, topical antibiotic ointment and bandage  Signs of infection were reviewed and she was instructed to call to begin antibiotic therapy at that time  Copy of the op note provided and reviewed  We will see her back in 1 week to recheck the wound  Questions answered, follow-up arranged  Diagnoses and all orders for this visit:    Encounter for care related to vascular access port        Subjective      Chief Complaint   Patient presents with    Post-op Problem     mediport incision opened     10- Patient is here today incision site opening, s/p Mediport insertion 10-  Patient states the site opened up yesterday and did ooze some blood  She stated that the area is sore  [address] [place] [address]    Pleasant 35-year-old female here for follow-up after MediPort insertion  Reports separation of the wound for which she was concerned  No significant drainage  No fevers or chills  Slight tenderness over the port insertion site  Review of Systems   Constitutional: Negative for chills and fever  Respiratory: Negative for shortness of breath  Cardiovascular: Negative for chest pain  Gastrointestinal: Negative for abdominal pain  Skin: Negative for rash  Neurological: Negative for headaches         The following portions of the patient's history were reviewed and updated as appropriate: allergies, current medications, past family history, past medical history, past social history, past surgical history and problem list     Objective      There were no vitals taken for this visit  Physical Exam   Constitutional: She is oriented to person, place, and time  She appears well-developed and well-nourished  No distress  HENT:   Head: Normocephalic and atraumatic  Eyes: Pupils are equal, round, and reactive to light  Conjunctivae and EOM are normal    Neck: Normal range of motion  Pulmonary/Chest: No respiratory distress  Port site noted  Pocket appears normal without signs of infection or fluid collection  Transverse incision noted with breakdown of the skin glue resulting in very minimal separation of the midportion of the incision  No signs of infection  This was cleansed and re-dressed with Neosporin and bandage  Musculoskeletal: Normal range of motion  Neurological: She is alert and oriented to person, place, and time  Skin: Skin is warm and dry  Capillary refill takes less than 2 seconds  She is not diaphoretic  Psychiatric: She has a normal mood and affect   Her behavior is normal        Signature:  Camron Feliciano PA-C  Date: 10/18/2019 Time: 2:06 PM

## 2019-10-23 ENCOUNTER — OFFICE VISIT (OUTPATIENT)
Dept: SURGERY | Facility: CLINIC | Age: 54
End: 2019-10-23

## 2019-10-23 DIAGNOSIS — Z45.2 ENCOUNTER FOR CARE RELATED TO VASCULAR ACCESS PORT: Primary | ICD-10-CM

## 2019-10-23 PROCEDURE — 99024 POSTOP FOLLOW-UP VISIT: CPT | Performed by: PHYSICIAN ASSISTANT

## 2019-10-23 RX ORDER — LIDOCAINE AND PRILOCAINE 25; 25 MG/G; MG/G
CREAM TOPICAL
Refills: 1 | COMMUNITY
Start: 2019-10-16

## 2019-10-23 NOTE — ASSESSMENT & PLAN NOTE
Patient well after left subclavian MediPort insertion under fluoroscopy  Partial wound separation improved since last week, wound care instructions reviewed  No signs of infection  Ongoing sharp tenderness superior and lateral to the port of unclear etiology, likely musculoskeletal and inflammatory in nature  Will have her return in a few weeks for recheck with the expectation that it should resolve spontaneously  Has upcoming appointment for scheduled infusion  Copy of the operative note provided and reviewed  Questions answered, follow-up arranged

## 2019-11-01 ENCOUNTER — HOSPITAL ENCOUNTER (OUTPATIENT)
Dept: SURGERY | Facility: HOSPITAL | Age: 54
Discharge: HOME | End: 2019-11-01
Payer: MEDICARE

## 2019-11-01 VITALS
OXYGEN SATURATION: 99 % | TEMPERATURE: 96.9 F | DIASTOLIC BLOOD PRESSURE: 84 MMHG | SYSTOLIC BLOOD PRESSURE: 138 MMHG | RESPIRATION RATE: 18 BRPM | HEART RATE: 57 BPM

## 2019-11-01 PROCEDURE — 96413 CHEMO IV INFUSION 1 HR: CPT

## 2019-11-01 RX ADMIN — VEDOLIZUMAB 300 MG: 300 INJECTION, POWDER, LYOPHILIZED, FOR SOLUTION INTRAVENOUS at 10:07

## 2019-11-01 NOTE — DISCHARGE INSTRUCTIONS
Vedolizumab (By injection)   Vedolizumab ([id number]-[id number])  Treats Crohn disease and ulcerative colitis  Brand Name(s): Entyvio   There may be other brand names for this medicine  When This Medicine Should Not Be Used: This medicine is not right for everyone  Do not use it if you had an allergic reaction to vedolizumab  How to Use This Medicine:   Injectable  · Your doctor will prescribe your dose and schedule  This medicine is given through a needle placed in a vein  · A nurse or other health provider will give you this medicine  · This medicine should come with a Medication Guide  Ask your pharmacist for a copy if you do not have one  · Missed dose: You must use this medicine on a fixed schedule  Call your doctor or pharmacist if you miss a dose  Drugs and Foods to Avoid:   Ask your doctor or pharmacist before using any other medicine, including over-the-counter medicines, vitamins, and herbal products  · Some medicines can affect how vedolizumab works  Tell your doctor if you are using natalizumab or a tumor necrosis factor (TNF) blocker such as adalimumab, certolizumab, etanercept, or infliximab  · This medicine may interfere with vaccines  Ask your doctor before you get a flu shot or any other vaccines  Warnings While Using This Medicine:   · Tell your doctor if you are pregnant or breastfeeding, or if you have liver disease or an infection  · This medicine may cause the following problems:  ¨ Higher risk of infection (including bacteria, virus, or fungus infection)  ¨ Risk of progressive multifocal leukoencephalopathy (brain disease)  ¨ Liver disease  · Your doctor will do lab tests at regular visits to check on the effects of this medicine  Keep all appointments    Possible Side Effects While Using This Medicine:   Call your doctor right away if you notice any of these side effects:  · Allergic reaction: Itching or hives, swelling in your face or hands, swelling or tingling in your mouth or throat, chest tightness, trouble breathing  · Blurred vision, confusion, dizziness, drowsiness, or unusual tiredness or weakness  · Change in how much or how often you urinate, bloody or cloudy urine, painful urination  · Dark urine or pale stools, nausea, vomiting, loss of appetite, stomach pain, yellow skin or eyes  · Fast, pounding, or uneven heartbeat  · Fever, chills, cough, runny or stuffy nose, sore throat, and body aches  If you notice these less serious side effects, talk with your doctor:   · Headache  · Joint pain  If you notice other side effects that you think are caused by this medicine, tell your doctor  Call your doctor for medical advice about side effects  You may report side effects to FDA at 4-630-FDA-1921  © 2017 [address] [employer] Information is for End User's use only and may not be sold, redistributed or otherwise used for commercial purposes  The above information is an  only  It is not intended as medical advice for individual conditions or treatments  Talk to your doctor, nurse or pharmacist before following any medical regimen to see if it is safe and effective for you

## 2019-12-10 ENCOUNTER — HOSPITAL ENCOUNTER (OUTPATIENT)
Dept: SURGERY | Facility: HOSPITAL | Age: 54
Discharge: HOME | End: 2019-12-10
Payer: MEDICARE

## 2019-12-10 VITALS
DIASTOLIC BLOOD PRESSURE: 73 MMHG | HEART RATE: 73 BPM | RESPIRATION RATE: 16 BRPM | OXYGEN SATURATION: 97 % | WEIGHT: 108 LBS | BODY MASS INDEX: 20.39 KG/M2 | HEIGHT: 61 IN | SYSTOLIC BLOOD PRESSURE: 143 MMHG | TEMPERATURE: 97.9 F

## 2019-12-10 LAB
ALBUMIN SERPL BCP-MCNC: 4.3 G/DL (ref 3.5–5.7)
ALP SERPL-CCNC: 61 U/L (ref 40–150)
ALT SERPL W P-5'-P-CCNC: 14 U/L (ref 7–52)
ANION GAP SERPL CALCULATED.3IONS-SCNC: 5 MMOL/L (ref 4–13)
AST SERPL W P-5'-P-CCNC: 19 U/L (ref 13–39)
BASOPHILS # BLD AUTO: 0 THOUSANDS/ΜL (ref 0–0.1)
BASOPHILS NFR BLD AUTO: 1 % (ref 0–2)
BILIRUB SERPL-MCNC: 0.4 MG/DL (ref 0.2–1)
BILIRUB UR QL STRIP: NEGATIVE
BUN SERPL-MCNC: 10 MG/DL (ref 7–25)
CALCIUM SERPL-MCNC: 9.1 MG/DL (ref 8.6–10.5)
CHLORIDE SERPL-SCNC: 102 MMOL/L (ref 98–107)
CHOLEST SERPL-MCNC: 171 MG/DL (ref 0–200)
CLARITY UR: CLEAR
CO2 SERPL-SCNC: 32 MMOL/L (ref 21–31)
COLOR UR: YELLOW
CREAT SERPL-MCNC: 0.74 MG/DL (ref 0.6–1.2)
EOSINOPHIL # BLD AUTO: 0.1 THOUSAND/ΜL (ref 0–0.61)
EOSINOPHIL NFR BLD AUTO: 2 % (ref 0–5)
ERYTHROCYTE [DISTWIDTH] IN BLOOD BY AUTOMATED COUNT: 13.1 % (ref 11.5–14.5)
GFR SERPL CREATININE-BSD FRML MDRD: 92 ML/MIN/1.73SQ M
GLUCOSE SERPL-MCNC: 99 MG/DL (ref 65–99)
GLUCOSE UR STRIP-MCNC: NEGATIVE MG/DL
HCT VFR BLD AUTO: 38.3 % (ref 42–47)
HDLC SERPL-MCNC: 74 MG/DL
HGB BLD-MCNC: 12.7 G/DL (ref 12–16)
HGB UR QL STRIP.AUTO: NEGATIVE
KETONES UR STRIP-MCNC: NEGATIVE MG/DL
LDLC SERPL CALC-MCNC: 80 MG/DL (ref 0–100)
LEUKOCYTE ESTERASE UR QL STRIP: NEGATIVE
LYMPHOCYTES # BLD AUTO: 2 THOUSANDS/ΜL (ref 0.6–4.47)
LYMPHOCYTES NFR BLD AUTO: 34 % (ref 21–51)
MCH RBC QN AUTO: 30.6 PG (ref 26–34)
MCHC RBC AUTO-ENTMCNC: 33.2 G/DL (ref 31–37)
MCV RBC AUTO: 92 FL (ref 81–99)
MONOCYTES # BLD AUTO: 0.7 THOUSAND/ΜL (ref 0.17–1.22)
MONOCYTES NFR BLD AUTO: 12 % (ref 2–12)
NEUTROPHILS # BLD AUTO: 3.1 THOUSANDS/ΜL (ref 1.4–6.5)
NEUTS SEG NFR BLD AUTO: 52 % (ref 42–75)
NITRITE UR QL STRIP: NEGATIVE
NONHDLC SERPL-MCNC: 97 MG/DL
PH UR STRIP.AUTO: 6.5 [PH]
PLATELET # BLD AUTO: 196 THOUSANDS/UL (ref 149–390)
PMV BLD AUTO: 9.1 FL (ref 8.6–11.7)
POTASSIUM SERPL-SCNC: 3.7 MMOL/L (ref 3.5–5.5)
PROT SERPL-MCNC: 6.9 G/DL (ref 6.4–8.9)
PROT UR STRIP-MCNC: NEGATIVE MG/DL
RBC # BLD AUTO: 4.16 MILLION/UL (ref 3.9–5.2)
SODIUM SERPL-SCNC: 139 MMOL/L (ref 134–143)
SP GR UR STRIP.AUTO: <=1.005 (ref 1–1.03)
T4 FREE SERPL-MCNC: 1.27 NG/DL (ref 0.76–1.46)
TRIGL SERPL-MCNC: 86 MG/DL (ref 44–166)
TSH 30M P TRH SERPL-ACNC: 3.13 UIU/ML
TSH SERPL DL<=0.05 MIU/L-ACNC: 3.11 UIU/ML
UROBILINOGEN UR QL STRIP.AUTO: 0.2 E.U./DL
WBC # BLD AUTO: 6 THOUSAND/UL (ref 4.8–10.8)

## 2019-12-10 PROCEDURE — 81003 URINALYSIS AUTO W/O SCOPE: CPT | Performed by: INTERNAL MEDICINE

## 2019-12-10 PROCEDURE — 84439 ASSAY OF FREE THYROXINE: CPT | Performed by: INTERNAL MEDICINE

## 2019-12-10 PROCEDURE — 80061 LIPID PANEL: CPT | Performed by: INTERNAL MEDICINE

## 2019-12-10 PROCEDURE — 85025 COMPLETE CBC W/AUTO DIFF WBC: CPT | Performed by: INTERNAL MEDICINE

## 2019-12-10 PROCEDURE — 84443 ASSAY THYROID STIM HORMONE: CPT | Performed by: INTERNAL MEDICINE

## 2019-12-10 PROCEDURE — 80053 COMPREHEN METABOLIC PANEL: CPT | Performed by: INTERNAL MEDICINE

## 2019-12-10 PROCEDURE — 82306 VITAMIN D 25 HYDROXY: CPT | Performed by: INTERNAL MEDICINE

## 2019-12-13 LAB
25(OH)D2 SERPL-MCNC: <1 NG/ML
25(OH)D3 SERPL-MCNC: 49 NG/ML
25(OH)D3+25(OH)D2 SERPL-MCNC: 49 NG/ML

## 2019-12-17 ENCOUNTER — HOSPITAL ENCOUNTER (OUTPATIENT)
Dept: SURGERY | Facility: HOSPITAL | Age: 54
Discharge: HOME | End: 2019-12-17
Payer: MEDICARE

## 2019-12-17 VITALS
HEART RATE: 61 BPM | OXYGEN SATURATION: 98 % | TEMPERATURE: 97.3 F | DIASTOLIC BLOOD PRESSURE: 76 MMHG | HEIGHT: 61 IN | WEIGHT: 107 LBS | RESPIRATION RATE: 18 BRPM | SYSTOLIC BLOOD PRESSURE: 146 MMHG | BODY MASS INDEX: 20.2 KG/M2

## 2019-12-17 PROCEDURE — 96413 CHEMO IV INFUSION 1 HR: CPT

## 2019-12-17 RX ADMIN — VEDOLIZUMAB 300 MG: 300 INJECTION, POWDER, LYOPHILIZED, FOR SOLUTION INTRAVENOUS at 10:44

## 2020-01-28 ENCOUNTER — HOSPITAL ENCOUNTER (OUTPATIENT)
Dept: SURGERY | Facility: HOSPITAL | Age: 55
Discharge: HOME | End: 2020-01-28
Payer: MEDICARE

## 2020-01-28 VITALS
HEART RATE: 62 BPM | BODY MASS INDEX: 20.01 KG/M2 | OXYGEN SATURATION: 99 % | RESPIRATION RATE: 18 BRPM | HEIGHT: 61 IN | TEMPERATURE: 98.2 F | WEIGHT: 106 LBS | SYSTOLIC BLOOD PRESSURE: 138 MMHG | DIASTOLIC BLOOD PRESSURE: 76 MMHG

## 2020-01-28 PROCEDURE — 96413 CHEMO IV INFUSION 1 HR: CPT

## 2020-01-28 RX ADMIN — VEDOLIZUMAB 300 MG: 300 INJECTION, POWDER, LYOPHILIZED, FOR SOLUTION INTRAVENOUS at 09:41

## 2020-03-10 ENCOUNTER — HOSPITAL ENCOUNTER (OUTPATIENT)
Dept: SURGERY | Facility: HOSPITAL | Age: 55
Discharge: HOME | End: 2020-03-10
Payer: MEDICARE

## 2020-03-10 VITALS
RESPIRATION RATE: 18 BRPM | SYSTOLIC BLOOD PRESSURE: 141 MMHG | HEART RATE: 56 BPM | TEMPERATURE: 98.4 F | DIASTOLIC BLOOD PRESSURE: 71 MMHG | HEIGHT: 61 IN | WEIGHT: 106 LBS | BODY MASS INDEX: 20.01 KG/M2 | OXYGEN SATURATION: 99 %

## 2020-03-10 PROCEDURE — 96413 CHEMO IV INFUSION 1 HR: CPT

## 2020-03-10 RX ORDER — METHYLPREDNISOLONE 4 MG/1
TABLET ORAL
COMMUNITY
Start: 2020-03-05 | End: 2020-04-21 | Stop reason: ALTCHOICE

## 2020-03-10 RX ORDER — PREDNISONE 20 MG/1
TABLET ORAL
COMMUNITY
Start: 2020-03-06 | End: 2021-07-08

## 2020-03-10 RX ADMIN — VEDOLIZUMAB 300 MG: 300 INJECTION, POWDER, LYOPHILIZED, FOR SOLUTION INTRAVENOUS at 09:30

## 2020-03-26 ENCOUNTER — TRANSCRIBE ORDERS (OUTPATIENT)
Dept: ADMINISTRATIVE | Facility: HOSPITAL | Age: 55
End: 2020-03-26

## 2020-03-26 ENCOUNTER — HOSPITAL ENCOUNTER (OUTPATIENT)
Dept: RADIOLOGY | Facility: HOSPITAL | Age: 55
Discharge: HOME | End: 2020-03-26
Attending: INTERNAL MEDICINE
Payer: MEDICARE

## 2020-03-26 DIAGNOSIS — M54.50 LOW BACK PAIN, UNSPECIFIED BACK PAIN LATERALITY, UNSPECIFIED CHRONICITY, UNSPECIFIED WHETHER SCIATICA PRESENT: ICD-10-CM

## 2020-03-26 DIAGNOSIS — M54.50 LOW BACK PAIN, UNSPECIFIED BACK PAIN LATERALITY, UNSPECIFIED CHRONICITY, UNSPECIFIED WHETHER SCIATICA PRESENT: Primary | ICD-10-CM

## 2020-03-26 PROCEDURE — 72110 X-RAY EXAM L-2 SPINE 4/>VWS: CPT

## 2020-04-21 ENCOUNTER — HOSPITAL ENCOUNTER (OUTPATIENT)
Dept: SURGERY | Facility: HOSPITAL | Age: 55
Discharge: HOME | End: 2020-04-21
Payer: MEDICARE

## 2020-04-21 VITALS
DIASTOLIC BLOOD PRESSURE: 48 MMHG | SYSTOLIC BLOOD PRESSURE: 103 MMHG | WEIGHT: 106 LBS | HEART RATE: 60 BPM | OXYGEN SATURATION: 98 % | TEMPERATURE: 96.9 F | RESPIRATION RATE: 16 BRPM | HEIGHT: 61 IN | BODY MASS INDEX: 20.01 KG/M2

## 2020-04-21 PROCEDURE — 96413 CHEMO IV INFUSION 1 HR: CPT

## 2020-04-21 RX ADMIN — VEDOLIZUMAB 300 MG: 300 INJECTION, POWDER, LYOPHILIZED, FOR SOLUTION INTRAVENOUS at 09:25

## 2020-06-02 ENCOUNTER — HOSPITAL ENCOUNTER (OUTPATIENT)
Dept: SURGERY | Facility: HOSPITAL | Age: 55
Discharge: HOME | End: 2020-06-02
Payer: MEDICARE

## 2020-06-02 VITALS
WEIGHT: 106 LBS | HEART RATE: 60 BPM | TEMPERATURE: 98 F | DIASTOLIC BLOOD PRESSURE: 85 MMHG | HEIGHT: 61 IN | SYSTOLIC BLOOD PRESSURE: 148 MMHG | BODY MASS INDEX: 20.01 KG/M2 | OXYGEN SATURATION: 97 % | RESPIRATION RATE: 18 BRPM

## 2020-06-02 PROCEDURE — 96413 CHEMO IV INFUSION 1 HR: CPT

## 2020-06-02 RX ADMIN — VEDOLIZUMAB 300 MG: 300 INJECTION, POWDER, LYOPHILIZED, FOR SOLUTION INTRAVENOUS at 09:29

## 2020-06-12 ENCOUNTER — TRANSCRIBE ORDERS (OUTPATIENT)
Dept: ADMINISTRATIVE | Facility: HOSPITAL | Age: 55
End: 2020-06-12

## 2020-06-12 DIAGNOSIS — Z12.31 SCREENING MAMMOGRAM FOR HIGH-RISK PATIENT: Primary | ICD-10-CM

## 2020-06-24 ENCOUNTER — HOSPITAL ENCOUNTER (OUTPATIENT)
Dept: MAMMOGRAPHY | Facility: HOSPITAL | Age: 55
Discharge: HOME | End: 2020-06-24
Attending: INTERNAL MEDICINE
Payer: MEDICARE

## 2020-06-24 ENCOUNTER — TRANSCRIBE ORDERS (OUTPATIENT)
Dept: ADMINISTRATIVE | Facility: HOSPITAL | Age: 55
End: 2020-06-24

## 2020-06-24 ENCOUNTER — APPOINTMENT (OUTPATIENT)
Dept: LAB | Facility: HOSPITAL | Age: 55
End: 2020-06-24
Attending: INTERNAL MEDICINE
Payer: MEDICARE

## 2020-06-24 VITALS — BODY MASS INDEX: 19.45 KG/M2 | HEIGHT: 61 IN | WEIGHT: 103 LBS

## 2020-06-24 DIAGNOSIS — K50.119 CROHN'S DISEASE OF LARGE INTESTINE WITH COMPLICATION (HCC): ICD-10-CM

## 2020-06-24 DIAGNOSIS — E03.9 ACQUIRED HYPOTHYROIDISM: ICD-10-CM

## 2020-06-24 DIAGNOSIS — K50.119 CROHN'S DISEASE OF LARGE INTESTINE WITH COMPLICATION (HCC): Primary | ICD-10-CM

## 2020-06-24 DIAGNOSIS — Z12.31 SCREENING MAMMOGRAM FOR HIGH-RISK PATIENT: ICD-10-CM

## 2020-06-24 DIAGNOSIS — E78.5 HYPERLIPIDEMIA, UNSPECIFIED HYPERLIPIDEMIA TYPE: ICD-10-CM

## 2020-06-24 LAB
ALBUMIN SERPL BCP-MCNC: 4.4 G/DL (ref 3.5–5.7)
ALP SERPL-CCNC: 96 U/L (ref 40–150)
ALT SERPL W P-5'-P-CCNC: 44 U/L (ref 7–52)
ANION GAP SERPL CALCULATED.3IONS-SCNC: 10 MMOL/L (ref 4–13)
AST SERPL W P-5'-P-CCNC: 45 U/L (ref 13–39)
BACTERIA UR QL AUTO: ABNORMAL /HPF
BASOPHILS # BLD AUTO: 0 THOUSANDS/ΜL (ref 0–0.1)
BASOPHILS NFR BLD AUTO: 1 % (ref 0–2)
BILIRUB SERPL-MCNC: 0.4 MG/DL (ref 0.2–1)
BILIRUB UR QL STRIP: NEGATIVE
BUN SERPL-MCNC: 9 MG/DL (ref 7–25)
CALCIUM SERPL-MCNC: 9.5 MG/DL (ref 8.6–10.5)
CHLORIDE SERPL-SCNC: 95 MMOL/L (ref 98–107)
CHOLEST SERPL-MCNC: 159 MG/DL (ref 0–200)
CLARITY UR: CLEAR
CO2 SERPL-SCNC: 32 MMOL/L (ref 21–31)
COLOR UR: ABNORMAL
CREAT SERPL-MCNC: 0.74 MG/DL (ref 0.6–1.2)
EOSINOPHIL # BLD AUTO: 0 THOUSAND/ΜL (ref 0–0.61)
EOSINOPHIL NFR BLD AUTO: 1 % (ref 0–5)
ERYTHROCYTE [DISTWIDTH] IN BLOOD BY AUTOMATED COUNT: 13.2 % (ref 11.5–14.5)
GFR SERPL CREATININE-BSD FRML MDRD: 92 ML/MIN/1.73SQ M
GLUCOSE P FAST SERPL-MCNC: 104 MG/DL (ref 65–99)
GLUCOSE UR STRIP-MCNC: NEGATIVE MG/DL
HCT VFR BLD AUTO: 41.3 % (ref 42–47)
HDLC SERPL-MCNC: 59 MG/DL
HGB BLD-MCNC: 14.2 G/DL (ref 12–16)
HGB UR QL STRIP.AUTO: ABNORMAL
KETONES UR STRIP-MCNC: NEGATIVE MG/DL
LDLC SERPL CALC-MCNC: 74 MG/DL (ref 0–100)
LEUKOCYTE ESTERASE UR QL STRIP: NEGATIVE
LYMPHOCYTES # BLD AUTO: 1.2 THOUSANDS/ΜL (ref 0.6–4.47)
LYMPHOCYTES NFR BLD AUTO: 18 % (ref 21–51)
MCH RBC QN AUTO: 31.3 PG (ref 26–34)
MCHC RBC AUTO-ENTMCNC: 34.5 G/DL (ref 31–37)
MCV RBC AUTO: 91 FL (ref 81–99)
MONOCYTES # BLD AUTO: 0.9 THOUSAND/ΜL (ref 0.17–1.22)
MONOCYTES NFR BLD AUTO: 13 % (ref 2–12)
NEUTROPHILS # BLD AUTO: 4.6 THOUSANDS/ΜL (ref 1.4–6.5)
NEUTS SEG NFR BLD AUTO: 68 % (ref 42–75)
NITRITE UR QL STRIP: NEGATIVE
NON-SQ EPI CELLS URNS QL MICRO: ABNORMAL /HPF
NONHDLC SERPL-MCNC: 100 MG/DL
PH UR STRIP.AUTO: 6.5 [PH]
PLATELET # BLD AUTO: 193 THOUSANDS/UL (ref 149–390)
PMV BLD AUTO: 10.6 FL (ref 8.6–11.7)
POTASSIUM SERPL-SCNC: 3.4 MMOL/L (ref 3.5–5.5)
PROT SERPL-MCNC: 7.6 G/DL (ref 6.4–8.9)
PROT UR STRIP-MCNC: NEGATIVE MG/DL
RBC # BLD AUTO: 4.55 MILLION/UL (ref 3.9–5.2)
RBC #/AREA URNS AUTO: ABNORMAL /HPF
SODIUM SERPL-SCNC: 137 MMOL/L (ref 134–143)
SP GR UR STRIP.AUTO: <=1.005 (ref 1–1.03)
T4 FREE SERPL-MCNC: 1.47 NG/DL (ref 0.76–1.46)
TRIGL SERPL-MCNC: 131 MG/DL (ref 44–166)
TSH SERPL DL<=0.05 MIU/L-ACNC: 2.38 UIU/ML (ref 0.45–5.33)
UROBILINOGEN UR QL STRIP.AUTO: 0.2 E.U./DL
WBC # BLD AUTO: 6.7 THOUSAND/UL (ref 4.8–10.8)
WBC #/AREA URNS AUTO: ABNORMAL /HPF

## 2020-06-24 PROCEDURE — 84439 ASSAY OF FREE THYROXINE: CPT

## 2020-06-24 PROCEDURE — 77063 BREAST TOMOSYNTHESIS BI: CPT

## 2020-06-24 PROCEDURE — 36415 COLL VENOUS BLD VENIPUNCTURE: CPT

## 2020-06-24 PROCEDURE — 81001 URINALYSIS AUTO W/SCOPE: CPT

## 2020-06-24 PROCEDURE — 80053 COMPREHEN METABOLIC PANEL: CPT

## 2020-06-24 PROCEDURE — 84443 ASSAY THYROID STIM HORMONE: CPT

## 2020-06-24 PROCEDURE — 80061 LIPID PANEL: CPT

## 2020-06-24 PROCEDURE — 85025 COMPLETE CBC W/AUTO DIFF WBC: CPT

## 2020-06-24 PROCEDURE — 77067 SCR MAMMO BI INCL CAD: CPT

## 2020-07-14 ENCOUNTER — HOSPITAL ENCOUNTER (OUTPATIENT)
Dept: SURGERY | Facility: HOSPITAL | Age: 55
Discharge: HOME | End: 2020-07-14
Payer: MEDICARE

## 2020-07-14 VITALS
TEMPERATURE: 98.3 F | OXYGEN SATURATION: 98 % | DIASTOLIC BLOOD PRESSURE: 84 MMHG | SYSTOLIC BLOOD PRESSURE: 133 MMHG | RESPIRATION RATE: 18 BRPM | HEART RATE: 85 BPM

## 2020-07-14 PROCEDURE — 96413 CHEMO IV INFUSION 1 HR: CPT

## 2020-07-14 RX ADMIN — VEDOLIZUMAB 300 MG: 300 INJECTION, POWDER, LYOPHILIZED, FOR SOLUTION INTRAVENOUS at 09:23

## 2020-07-14 NOTE — DISCHARGE INSTRUCTIONS
Vedolizumab (By injection)   Vedolizumab ([id number]-[id number]-PBD-ue-mab)  Treats Crohn disease and ulcerative colitis  Brand Name(s): Entyvio   There may be other brand names for this medicine  When This Medicine Should Not Be Used: This medicine is not right for everyone  Do not use it if you had an allergic reaction to vedolizumab  How to Use This Medicine:   Injectable  · Your doctor will prescribe your dose and schedule  This medicine is given through a needle placed in a vein  · A nurse or other health provider will give you this medicine  · This medicine should come with a Medication Guide  Ask your pharmacist for a copy if you do not have one  · Missed dose: You must use this medicine on a fixed schedule  Call your doctor or pharmacist if you miss a dose  Drugs and Foods to Avoid:   Ask your doctor or pharmacist before using any other medicine, including over-the-counter medicines, vitamins, and herbal products  · Some medicines can affect how vedolizumab works  Tell your doctor if you are using natalizumab or a tumor necrosis factor (TNF) blocker such as adalimumab, certolizumab, etanercept, or infliximab  · This medicine may interfere with vaccines  Ask your doctor before you get a flu shot or any other vaccines  Warnings While Using This Medicine:   · Tell your doctor if you are pregnant or breastfeeding, or if you have liver disease or an infection  · This medicine may cause the following problems:  ¨ Higher risk of infection (including bacteria, virus, or fungus infection)  ¨ Risk of progressive multifocal leukoencephalopathy (brain disease)  ¨ Liver disease  · Your doctor will do lab tests at regular visits to check on the effects of this medicine  Keep all appointments    Possible Side Effects While Using This Medicine:   Call your doctor right away if you notice any of these side effects:  · Allergic reaction: Itching or hives, swelling in your face or hands, swelling or tingling in your mouth or throat, chest tightness, trouble breathing  · Blurred vision, confusion, dizziness, drowsiness, or unusual tiredness or weakness  · Change in how much or how often you urinate, bloody or cloudy urine, painful urination  · Dark urine or pale stools, nausea, vomiting, loss of appetite, stomach pain, yellow skin or eyes  · Fast, pounding, or uneven heartbeat  · Fever, chills, cough, runny or stuffy nose, sore throat, and body aches  If you notice these less serious side effects, talk with your doctor:   · Headache  · Joint pain  If you notice other side effects that you think are caused by this medicine, tell your doctor  Call your doctor for medical advice about side effects  You may report side effects to FDA at 5-579-FDA-5468  © 2017 [address] [employer] Information is for End User's use only and may not be sold, redistributed or otherwise used for commercial purposes  The above information is an  only  It is not intended as medical advice for individual conditions or treatments  Talk to your doctor, nurse or pharmacist before following any medical regimen to see if it is safe and effective for you

## 2020-07-28 ENCOUNTER — TRANSCRIBE ORDERS (OUTPATIENT)
Dept: ADMINISTRATIVE | Facility: HOSPITAL | Age: 55
End: 2020-07-28

## 2020-07-28 DIAGNOSIS — R19.7 DIARRHEA, UNSPECIFIED TYPE: ICD-10-CM

## 2020-07-28 DIAGNOSIS — R10.12 LUQ ABDOMINAL PAIN: ICD-10-CM

## 2020-07-28 DIAGNOSIS — R63.4 ABNORMAL WEIGHT LOSS: ICD-10-CM

## 2020-07-28 DIAGNOSIS — K50.019 CROHN'S DISEASE OF SMALL INTESTINE WITH COMPLICATION (HCC): Primary | ICD-10-CM

## 2020-07-29 ENCOUNTER — APPOINTMENT (OUTPATIENT)
Dept: LAB | Facility: HOSPITAL | Age: 55
End: 2020-07-29
Payer: MEDICARE

## 2020-07-29 ENCOUNTER — TRANSCRIBE ORDERS (OUTPATIENT)
Dept: LAB | Facility: HOSPITAL | Age: 55
End: 2020-07-29

## 2020-07-29 DIAGNOSIS — K50.00 CROHN'S DISEASE OF SMALL INTESTINE WITHOUT COMPLICATION (HCC): ICD-10-CM

## 2020-07-29 DIAGNOSIS — K50.00 CROHN'S DISEASE OF SMALL INTESTINE WITHOUT COMPLICATION (HCC): Primary | ICD-10-CM

## 2020-07-29 DIAGNOSIS — R94.5 ABNORMAL RESULTS OF LIVER FUNCTION STUDIES: ICD-10-CM

## 2020-07-29 DIAGNOSIS — K21.9 GASTROESOPHAGEAL REFLUX DISEASE WITHOUT ESOPHAGITIS: ICD-10-CM

## 2020-07-29 DIAGNOSIS — R63.4 ABNORMAL WEIGHT LOSS: ICD-10-CM

## 2020-07-29 DIAGNOSIS — R10.12 LEFT UPPER QUADRANT PAIN: ICD-10-CM

## 2020-07-29 DIAGNOSIS — R53.83 OTHER FATIGUE: ICD-10-CM

## 2020-07-29 DIAGNOSIS — R19.7 DIARRHEA, UNSPECIFIED TYPE: ICD-10-CM

## 2020-07-29 LAB
25(OH)D3 SERPL-MCNC: 73.3 NG/ML (ref 30–100)
AFP-TM SERPL-MCNC: 3.3 NG/ML (ref 0.5–8)
FOLATE SERPL-MCNC: >20 NG/ML (ref 3.1–17.5)
LIPASE SERPL-CCNC: 18 U/L (ref 11–82)
VIT B12 SERPL-MCNC: 720 PG/ML (ref 100–900)

## 2020-07-29 PROCEDURE — 82746 ASSAY OF FOLIC ACID SERUM: CPT

## 2020-07-29 PROCEDURE — 82306 VITAMIN D 25 HYDROXY: CPT

## 2020-07-29 PROCEDURE — 82105 ALPHA-FETOPROTEIN SERUM: CPT

## 2020-07-29 PROCEDURE — 82607 VITAMIN B-12: CPT

## 2020-07-29 PROCEDURE — 36415 COLL VENOUS BLD VENIPUNCTURE: CPT

## 2020-07-29 PROCEDURE — 82397 CHEMILUMINESCENT ASSAY: CPT

## 2020-07-29 PROCEDURE — 83690 ASSAY OF LIPASE: CPT

## 2020-07-29 PROCEDURE — 80280 DRUG ASSAY VEDOLIZUMAB: CPT

## 2020-08-04 ENCOUNTER — HOSPITAL ENCOUNTER (OUTPATIENT)
Dept: ULTRASOUND IMAGING | Facility: HOSPITAL | Age: 55
Discharge: HOME | End: 2020-08-04
Payer: MEDICARE

## 2020-08-04 DIAGNOSIS — R63.4 ABNORMAL WEIGHT LOSS: ICD-10-CM

## 2020-08-04 DIAGNOSIS — R19.7 DIARRHEA, UNSPECIFIED TYPE: ICD-10-CM

## 2020-08-04 DIAGNOSIS — K50.019 CROHN'S DISEASE OF SMALL INTESTINE WITH COMPLICATION (HCC): ICD-10-CM

## 2020-08-04 DIAGNOSIS — R10.12 LUQ ABDOMINAL PAIN: ICD-10-CM

## 2020-08-04 PROCEDURE — 76700 US EXAM ABDOM COMPLETE: CPT

## 2020-08-07 LAB — MISCELLANEOUS LAB TEST RESULT: NORMAL

## 2020-08-11 ENCOUNTER — APPOINTMENT (OUTPATIENT)
Dept: LAB | Facility: HOSPITAL | Age: 55
End: 2020-08-11
Attending: INTERNAL MEDICINE
Payer: MEDICARE

## 2020-08-11 ENCOUNTER — TRANSCRIBE ORDERS (OUTPATIENT)
Dept: LAB | Facility: HOSPITAL | Age: 55
End: 2020-08-11

## 2020-08-11 DIAGNOSIS — E03.9 MYXEDEMA HEART DISEASE: ICD-10-CM

## 2020-08-11 DIAGNOSIS — I51.9 MYXEDEMA HEART DISEASE: ICD-10-CM

## 2020-08-11 DIAGNOSIS — E03.9 MYXEDEMA HEART DISEASE: Primary | ICD-10-CM

## 2020-08-11 DIAGNOSIS — I51.9 MYXEDEMA HEART DISEASE: Primary | ICD-10-CM

## 2020-08-11 LAB
T4 FREE SERPL-MCNC: 1.12 NG/DL (ref 0.76–1.46)
TSH SERPL DL<=0.05 MIU/L-ACNC: 10.36 UIU/ML (ref 0.45–5.33)

## 2020-08-11 PROCEDURE — 36415 COLL VENOUS BLD VENIPUNCTURE: CPT

## 2020-08-11 PROCEDURE — 84443 ASSAY THYROID STIM HORMONE: CPT

## 2020-08-11 PROCEDURE — 84439 ASSAY OF FREE THYROXINE: CPT

## 2020-08-17 ENCOUNTER — TRANSCRIBE ORDERS (OUTPATIENT)
Dept: LAB | Facility: HOSPITAL | Age: 55
End: 2020-08-17

## 2020-08-17 ENCOUNTER — APPOINTMENT (OUTPATIENT)
Dept: LAB | Facility: HOSPITAL | Age: 55
End: 2020-08-17
Payer: MEDICARE

## 2020-08-17 DIAGNOSIS — K50.019 CROHN'S DISEASE OF SMALL INTESTINE WITH COMPLICATION (HCC): ICD-10-CM

## 2020-08-17 DIAGNOSIS — R63.4 LOSS OF WEIGHT: ICD-10-CM

## 2020-08-17 DIAGNOSIS — E55.9 AVITAMINOSIS D: ICD-10-CM

## 2020-08-17 DIAGNOSIS — R94.5 NONSPECIFIC ABNORMAL RESULTS OF LIVER FUNCTION STUDY: ICD-10-CM

## 2020-08-17 DIAGNOSIS — R19.7 DIARRHEA OF PRESUMED INFECTIOUS ORIGIN: ICD-10-CM

## 2020-08-17 DIAGNOSIS — K21.9 GASTROESOPHAGEAL REFLUX DISEASE WITHOUT ESOPHAGITIS: ICD-10-CM

## 2020-08-17 DIAGNOSIS — K50.019 CROHN'S DISEASE OF SMALL INTESTINE WITH COMPLICATION (HCC): Primary | ICD-10-CM

## 2020-08-17 LAB — IGA SERPL-MCNC: 212 MG/DL (ref 70–400)

## 2020-08-17 PROCEDURE — 82784 ASSAY IGA/IGD/IGG/IGM EACH: CPT

## 2020-08-17 PROCEDURE — 83516 IMMUNOASSAY NONANTIBODY: CPT

## 2020-08-17 PROCEDURE — 86255 FLUORESCENT ANTIBODY SCREEN: CPT

## 2020-08-17 PROCEDURE — 36415 COLL VENOUS BLD VENIPUNCTURE: CPT

## 2020-08-19 ENCOUNTER — ANESTHESIA EVENT (OUTPATIENT)
Dept: GASTROENTEROLOGY | Facility: HOSPITAL | Age: 55
End: 2020-08-19

## 2020-08-19 LAB
ENDOMYSIUM IGA SER QL: NEGATIVE
GLIADIN PEPTIDE IGA SER-ACNC: 4 UNITS (ref 0–19)
GLIADIN PEPTIDE IGG SER-ACNC: 2 UNITS (ref 0–19)
IGA SERPL-MCNC: 217 MG/DL (ref 87–352)
TTG IGA SER-ACNC: <2 U/ML (ref 0–3)
TTG IGG SER-ACNC: <2 U/ML (ref 0–5)

## 2020-08-20 ENCOUNTER — ANESTHESIA (OUTPATIENT)
Dept: GASTROENTEROLOGY | Facility: HOSPITAL | Age: 55
End: 2020-08-20

## 2020-08-20 ENCOUNTER — HOSPITAL ENCOUNTER (OUTPATIENT)
Dept: GASTROENTEROLOGY | Facility: HOSPITAL | Age: 55
Setting detail: OUTPATIENT SURGERY
Discharge: HOME | End: 2020-08-20
Attending: INTERNAL MEDICINE | Admitting: INTERNAL MEDICINE
Payer: MEDICARE

## 2020-08-20 VITALS
HEART RATE: 102 BPM | BODY MASS INDEX: 18.31 KG/M2 | TEMPERATURE: 97.6 F | HEIGHT: 61 IN | SYSTOLIC BLOOD PRESSURE: 107 MMHG | RESPIRATION RATE: 18 BRPM | OXYGEN SATURATION: 95 % | DIASTOLIC BLOOD PRESSURE: 75 MMHG | WEIGHT: 97 LBS

## 2020-08-20 DIAGNOSIS — K50.00 CROHN'S DISEASE OF SMALL INTESTINE WITHOUT COMPLICATIONS (HCC): ICD-10-CM

## 2020-08-20 DIAGNOSIS — R63.4 ABNORMAL WEIGHT LOSS: ICD-10-CM

## 2020-08-20 PROCEDURE — 88305 TISSUE EXAM BY PATHOLOGIST: CPT | Performed by: PATHOLOGY

## 2020-08-20 RX ORDER — LIDOCAINE HYDROCHLORIDE 20 MG/ML
INJECTION, SOLUTION EPIDURAL; INFILTRATION; INTRACAUDAL; PERINEURAL AS NEEDED
Status: DISCONTINUED | OUTPATIENT
Start: 2020-08-20 | End: 2020-08-20

## 2020-08-20 RX ORDER — SODIUM CHLORIDE 9 MG/ML
125 INJECTION, SOLUTION INTRAVENOUS CONTINUOUS
Status: DISCONTINUED | OUTPATIENT
Start: 2020-08-20 | End: 2020-08-24 | Stop reason: HOSPADM

## 2020-08-20 RX ORDER — PROPOFOL 10 MG/ML
INJECTION, EMULSION INTRAVENOUS AS NEEDED
Status: DISCONTINUED | OUTPATIENT
Start: 2020-08-20 | End: 2020-08-20

## 2020-08-20 RX ADMIN — PROPOFOL 50 MG: 10 INJECTION, EMULSION INTRAVENOUS at 11:32

## 2020-08-20 RX ADMIN — SODIUM CHLORIDE 125 ML/HR: 0.9 INJECTION, SOLUTION INTRAVENOUS at 10:14

## 2020-08-20 RX ADMIN — PROPOFOL 50 MG: 10 INJECTION, EMULSION INTRAVENOUS at 11:23

## 2020-08-20 RX ADMIN — PROPOFOL 50 MG: 10 INJECTION, EMULSION INTRAVENOUS at 11:38

## 2020-08-20 RX ADMIN — PROPOFOL 80 MG: 10 INJECTION, EMULSION INTRAVENOUS at 11:13

## 2020-08-20 RX ADMIN — PROPOFOL 50 MG: 10 INJECTION, EMULSION INTRAVENOUS at 11:19

## 2020-08-20 RX ADMIN — PROPOFOL 20 MG: 10 INJECTION, EMULSION INTRAVENOUS at 11:14

## 2020-08-20 RX ADMIN — PROPOFOL 50 MG: 10 INJECTION, EMULSION INTRAVENOUS at 11:28

## 2020-08-20 RX ADMIN — PROPOFOL 50 MG: 10 INJECTION, EMULSION INTRAVENOUS at 11:15

## 2020-08-20 RX ADMIN — LIDOCAINE HYDROCHLORIDE 100 MG: 20 INJECTION, SOLUTION EPIDURAL; INFILTRATION; INTRACAUDAL; PERINEURAL at 11:13

## 2020-08-20 NOTE — DISCHARGE INSTRUCTIONS
Colonoscopy   WHAT YOU NEED TO KNOW:   A colonoscopy is a procedure to examine the inside of your colon (intestine) with a scope  Polyps or tissue growths may have been removed during your colonoscopy  It is normal to feel bloated and to have some abdominal discomfort  You should be passing gas  If you have hemorrhoids or you had polyps removed, you may have a small amount of bleeding  DISCHARGE INSTRUCTIONS:   Seek care immediately if:   · You have a large amount of bright red blood in your bowel movements  · Your abdomen is hard and firm and you have severe pain  · You have sudden trouble breathing  Contact your healthcare provider if:   · You develop a rash or hives  · You have a fever within 24 hours of your procedure       · You have not had a bowel movement for 3 days after your procedure  · You have questions or concerns about your condition or care  Activity:   · Do not lift, strain, or run  for 3 days after your procedure  · Rest after your procedure  You have been given medicine to relax you  Do not  drive or make important decisions until the day after your procedure  Return to your normal activity as directed  · Relieve gas and discomfort from bloating  by lying on your right side with a heating pad on your abdomen  You may need to take short walks to help the gas move out  Eat small meals until bloating is relieved  If you had polyps removed: For 7 days after your procedure:  · Do not  take aspirin  · Do not  go on long car rides  Follow up with your healthcare provider as directed:  Write down your questions so you remember to ask them during your visits  © 2017 [address] is for End User's use only and may not be sold, redistributed or otherwise used for commercial purposes  All illustrations and images included in CareNotes® are the copyrighted property of [employer]  or Higinio Morris    The above information is an  only  It is not intended as medical advice for individual conditions or treatments  Talk to your doctor, nurse or pharmacist before following any medical regimen to see if it is safe and effective for you  Upper Endoscopy   WHAT YOU NEED TO KNOW:   An upper endoscopy is also called an upper gastrointestinal (GI) endoscopy, or an esophagogastroduodenoscopy (EGD)  You may feel bloated, gassy, or have some abdominal discomfort after your procedure  Your throat may be sore for 24 to 36 hours  You may burp or pass gas from air that is still inside your body  DISCHARGE INSTRUCTIONS:   Call 911 if:   · You have sudden chest pain or trouble breathing  Seek care immediately if:   · You feel dizzy or faint  · You have trouble swallowing  · You have severe throat pain  · Your bowel movements are very dark or black  · Your abdomen is hard and firm and you have severe pain  · You vomit blood  Contact your healthcare provider if:   · You feel full or bloated and cannot burp or pass gas  · You have not had a bowel movement for 3 days after your procedure  · You have neck pain  · You have a fever or chills  · You have nausea or are vomiting  · You have a rash or hives  · You have questions or concerns about your endoscopy  Relieve a sore throat:  Suck on throat lozenges or crushed ice  Gargle with a small amount of warm salt water  Mix 1 teaspoon of salt and 1 cup of warm water to make salt water  Relieve gas and discomfort from bloating:  Lie on your right side with a heating pad on your abdomen  Take short walks to help pass gas  Eat small meals until bloating is relieved  Rest after your procedure:  Do not drive or make important decisions until the day after your procedure  Return to your normal activity as directed  You can usually return to work the day after your procedure    Follow up with your healthcare provider as directed:  Write down your questions so you remember to ask them during your visits  © 2017 [facility] Information is for End User's use only and may not be sold, redistributed or otherwise used for commercial purposes  All illustrations and images included in CareNotes® are the copyrighted property of [employer]  or Higinio Morris  The above information is an  only  It is not intended as medical advice for individual conditions or treatments  Talk to your doctor, nurse or pharmacist before following any medical regimen to see if it is safe and effective for you  Gastroesophageal Reflux Disease   WHAT YOU NEED TO KNOW:   What is gastroesophageal reflux disease? Gastroesophageal reflux occurs when acid and food in the stomach back up into the esophagus  Gastroesophageal reflux disease (GERD) is reflux that occurs more than twice a week for a few weeks  It usually causes heartburn and other symptoms  GERD can cause other health problems over time if it is not treated  What causes GERD? GERD often occurs when the lower muscle (sphincter) of the esophagus does not close properly  The sphincter normally opens to let food into the stomach  It then closes to keep food and stomach acid in the stomach  If the sphincter does not close properly, stomach acid and food back up (reflux) into the esophagus  The following may increase your risk for GERD:  · Certain foods such as spicy foods, chocolate, foods that contain caffeine, peppermint, and fried foods    · Hiatal hernia    · Certain medicines such as calcium channel blockers (used to treat high blood pressure), allergy medicines, sedatives, or antidepressants    · Pregnancy or obesity    · Lying down after a meal    · Drinking alcohol or smoking  What are the signs and symptoms of GERD? Heartburn is the most common symptom of GERD  You may feel burning pain in your chest or below the breast bone   This usually occurs after meals and spreads to your neck, jaw, or shoulder  The pain gets better when you change positions  You may also have any of the following:  · Bitter or acid taste in your mouth    · Dry cough    · Trouble swallowing or pain with swallowing    · Hoarseness or sore throat    · Frequent burping or hiccups    · Feeling of fullness soon after you start eating  How is GERD diagnosed? Your healthcare provider will ask about your symptoms and when they started  Tell him or her about other medical conditions you have, your eating habits, and your activities  You may also need any of the following:  · Esophageal pH monitoring  is used to place a small probe inside your esophagus and stomach to check the amount of acid  · An endoscopy  is a procedure used to look at the inside of your esophagus and stomach  An endoscope is a bendable tube with a light and camera on the end  Your healthcare provider may remove a small sample of tissue and send it to a lab for tests  · Upper GI x-rays  are done to take pictures of your stomach and intestines (bowel)  You may be given a chalky liquid to drink before the pictures are taken  This liquid helps your stomach and intestines show up better on the x-rays  · Esophageal manometry  is a test that shows how your esophagus pushes food and fluid to your stomach  It also shows the pressures in your esophagus and stomach  It may show a hiatal hernia  How is GERD treated? · Medicines  are used to decrease stomach acid  Medicine may also be used to help your lower esophageal sphincter and stomach contract (tighten) more  · Surgery  is done to wrap the upper part of the stomach around the esophageal sphincter  This will strengthen the sphincter and prevent reflux  How can I help manage GERD? · Do not have foods or drinks that may increase heartburn  These include chocolate, peppermint, fried or fatty foods, drinks that contain caffeine, or carbonated drinks (soda)   Other foods include spicy foods, onions, tomatoes, and tomato-based foods  Do not have foods or drinks that can irritate your esophagus, such as citrus fruits, juices, and alcohol  · Do not eat large meals  When you eat a lot of food at one time, your stomach needs more acid to digest it  Eat 6 small meals each day instead of 3 large ones, and eat slowly  Do not eat meals 2 to 3 hours before bedtime  · Elevate the head of your bed  Place 6-inch blocks under the head of your bed frame  You may also use more than one pillow under your head and shoulders while you sleep  · Maintain a healthy weight  If you are overweight, weight loss may help relieve symptoms of GERD  · Do not smoke  Smoking weakens the lower esophageal sphincter and increases the risk of GERD  Ask your healthcare provider for information if you currently smoke and need help to quit  E-cigarettes or smokeless tobacco still contain nicotine  Talk to your healthcare provider before you use these products  · Do not wear clothing that is tight around your waist   Tight clothing can put pressure on your stomach and cause or worsen GERD symptoms  When should I seek immediate care? · You feel full and cannot burp or vomit  · You have severe chest pain and sudden trouble breathing  · Your bowel movements are black, bloody, or tarry-looking  · Your vomit looks like coffee grounds or has blood in it  When should I contact my healthcare provider? · You vomit large amounts, or you vomit often  · You have trouble breathing after you vomit  · You have trouble swallowing, or pain with swallowing  · You are losing weight without trying  · Your symptoms get worse or do not improve with treatment  · You have questions or concerns about your condition or care  CARE AGREEMENT:   You have the right to help plan your care  Learn about your health condition and how it may be treated   Discuss treatment options with your caregivers to decide what care you want to receive  You always have the right to refuse treatment  The above information is an  only  It is not intended as medical advice for individual conditions or treatments  Talk to your doctor, nurse or pharmacist before following any medical regimen to see if it is safe and effective for you  © 2017 [address] Information is for End User's use only and may not be sold, redistributed or otherwise used for commercial purposes  All illustrations and images included in CareNotes® are the copyrighted property of [employer]  or [employer]  Diet for Stomach Ulcers and Gastritis   WHAT YOU NEED TO KNOW:   What is a diet for stomach ulcers and gastritis? A diet for ulcers and gastritis is a meal plan that limits foods that irritate your stomach  Certain foods may worsen symptoms such as stomach pain, bloating, heartburn, or indigestion  Which foods should I limit or avoid? You may need to avoid acidic, spicy, or high-fat foods  Not all foods affect everyone the same way  You will need to learn which foods worsen your symptoms and limit those foods   The following are some foods that may worsen ulcer or gastritis symptoms:  · Beverages:      ¨ Whole milk and chocolate milk    ¨ Hot cocoa and cola    ¨ Any beverage with caffeine    ¨ Regular and decaffeinated coffee    ¨ Peppermint and spearmint tea    ¨ Green and black tea, with or without caffeine    ¨ Orange and grapefruit juices    ¨ Drinks that contain alcohol    · Spices and seasonings:      ¨ Black and red pepper    ¨ Chili powder    ¨ Mustard seed and nutmeg    · Other foods:      ¨ Dairy foods made from whole milk or cream    ¨ Chocolate    ¨ Spicy or strongly flavored cheeses, such as jalapeno or black pepper    ¨ Highly seasoned, high-fat meats, such as sausage, salami, ramirez, ham, and cold cuts    ¨ Hot chiles and peppers    ¨ Tomato products, such as tomato paste, tomato sauce, or tomato juice  Which foods can I eat and drink? Eat a variety of healthy foods from all the food groups  Eat fruits, vegetables, whole grains, and fat-free or low-fat dairy foods  Whole grains include whole-wheat breads, cereals, pasta, and brown rice  Choose lean meats, poultry (chicken and turkey), fish, beans, eggs, and nuts  A healthy meal plan is low in unhealthy fats, salt, and added sugar  Healthy fats include olive oil and canola oil  Ask your dietitian for more information about a healthy meal plan  What other guidelines may be helpful? · Do not eat right before bedtime  Stop eating at least 2 hours before bedtime  · Eat small, frequent meals  Your stomach may tolerate small, frequent meals better than large meals  CARE AGREEMENT:   You have the right to help plan your care  Discuss treatment options with your caregivers to decide what care you want to receive  You always have the right to refuse treatment  The above information is an  only  It is not intended as medical advice for individual conditions or treatments  Talk to your doctor, nurse or pharmacist before following any medical regimen to see if it is safe and effective for you  © 2017 [address]  Information is for End User's use only and may not be sold, redistributed or otherwise used for commercial purposes  All illustrations and images included in CareNotes® are the copyrighted property of [employer]  or [employer]in[employer] Morris  Hiatal Hernia   WHAT YOU NEED TO KNOW:   What is a hiatal hernia? A hiatal hernia is a condition that causes part of your stomach to bulge through the hiatus (small opening) in your diaphragm  The part of the stomach may move up and down, or it may get trapped above the diaphragm  What increases my risk for a hiatal hernia? The exact cause of a hiatal hernia is not known  You may have been born with a large hiatus   The following may increase your risk of a hiatal hernia:  · Obesity    · Older age    · Medical conditions such as diverticulosis or esophagitis    · Previous surgery of the esophagus or stomach or trauma such as from a motor vehicle accident  What are the types of hiatal hernia? · Type I (sliding hiatal hernia): A portion of the stomach slides in and out of the hiatus  This type is the most common and usually causes gastroesophageal reflux disease (GERD)  GERD occurs when the esophageal sphincter does not close properly and causes acid reflux  The esophageal sphincter is the lower muscle of the esophagus  · Type II (paraesophageal hiatal hernia):  Type II hiatal hernia forms when a part of the stomach squeezes through the hiatus and lies next to the esophagus  · Type III (combined):  Type III hiatal hernia is a combination of a sliding and a paraesophageal hiatal hernia  · Type IV (complex paraesophageal hiatal hernia): The whole stomach, the small and large bowels, spleen, pancreas, or liver is pushed up into the chest   What are the signs and symptoms of a hiatal hernia? The most common symptom is heartburn  This usually occurs after meals and spreads to your neck, jaw, or shoulder  You may have no signs or symptoms, or you may have any of the following:  · Abdominal pain, especially in the area just above your navel    · Bitter or acid taste in your mouth    · Trouble swallowing    · Coughing or hoarseness    · Chest pain or shortness of breath that occurs after eating    · Frequent burping or hiccups    · Uncomfortable feeling of fullness after eating  How is a hiatal hernia diagnosed? · An upper GI series test  includes x-rays of your esophagus, stomach, and your small intestines  It is also called a barium swallow test  You will be given barium (a chalky liquid) to drink before the pictures are taken  This liquid helps your stomach and intestines show up better on the x-rays   An upper GI series can show if you have an ulcer, a blocked intestine, or other problems  · An endoscopy  uses a scope to see the inside of your digestive tract  A scope is a long, bendable tube with a light on the end of it  A camera may be hooked to the scope to take pictures  How is a hiatal hernia treated? Treatment depends on the type of hiatal hernia you have and on your symptoms  You may not need any treatment  You may need any of the following:  · Medicines  may be given to relieve heartburn symptoms  These medicines help to decrease or block stomach acid  You may also be given medicines that help to tighten the esophageal sphincter  · Surgery  may be done when medicines cannot control your symptoms, or other problems are present  Your healthcare provider may also suggest surgery depending on the type of hernia you have  Your healthcare provider can put your stomach back into its normal location  He may make the hiatus (hole) smaller and anchor your stomach in your abdomen  Fundoplication is a surgery that wraps the upper part of the stomach around the esophageal sphincter to strengthen it  How can I manage symptoms? The following nutrition and lifestyle changes may be recommended to relieve symptoms of heartburn  · Avoid foods that make your symptoms worse  These may include spicy foods, fruit juices, alcohol, caffeine, chocolate, and mint  · Eat several small meals during the day  Small meals give your stomach less food to digest     · Avoid lying down and bending forward after you eat  Do not eat meals 2 to 3 hours before bedtime  This decreases your risk for reflux  · Maintain a healthy weight  If you are overweight, weight loss may help relieve your symptoms  · Sleep with your head elevated  at least 6 inches  · Do not smoke  Smoking can increase your symptoms of heartburn  When should I seek immediate care? · You have severe abdominal pain  · You try to vomit but nothing comes out (retching)       · You have severe chest pain and sudden trouble breathing  · Your bowel movements are black or bloody  · Your vomit looks like coffee grounds or has blood in it  When should I contact my healthcare provider? · Your symptoms are getting worse  · You have nausea, and you are vomiting  · You are losing weight without trying  · You have questions or concerns about your condition or care  CARE AGREEMENT:   You have the right to help plan your care  Learn about your health condition and how it may be treated  Discuss treatment options with your caregivers to decide what care you want to receive  You always have the right to refuse treatment  The above information is an  only  It is not intended as medical advice for individual conditions or treatments  Talk to your doctor, nurse or pharmacist before following any medical regimen to see if it is safe and effective for you  © 2017 [address]  Information is for End User's use only and may not be sold, redistributed or otherwise used for commercial purposes  All illustrations and images included in CareNotes® are the copyrighted property of [employer]  or [employer] Morris  Hiatal Hernia   WHAT YOU NEED TO KNOW:   What is a hiatal hernia? A hiatal hernia is a condition that causes part of your stomach to bulge through the hiatus (small opening) in your diaphragm  The part of the stomach may move up and down, or it may get trapped above the diaphragm  What increases my risk for a hiatal hernia? The exact cause of a hiatal hernia is not known  You may have been born with a large hiatus  The following may increase your risk of a hiatal hernia:  · Obesity    · Older age    · Medical conditions such as diverticulosis or esophagitis    · Previous surgery of the esophagus or stomach or trauma such as from a motor vehicle accident  What are the types of hiatal hernia? · Type I (sliding hiatal hernia):   A portion of the stomach slides in and out of the hiatus  This type is the most common and usually causes gastroesophageal reflux disease (GERD)  GERD occurs when the esophageal sphincter does not close properly and causes acid reflux  The esophageal sphincter is the lower muscle of the esophagus  · Type II (paraesophageal hiatal hernia):  Type II hiatal hernia forms when a part of the stomach squeezes through the hiatus and lies next to the esophagus  · Type III (combined):  Type III hiatal hernia is a combination of a sliding and a paraesophageal hiatal hernia  · Type IV (complex paraesophageal hiatal hernia): The whole stomach, the small and large bowels, spleen, pancreas, or liver is pushed up into the chest   What are the signs and symptoms of a hiatal hernia? The most common symptom is heartburn  This usually occurs after meals and spreads to your neck, jaw, or shoulder  You may have no signs or symptoms, or you may have any of the following:  · Abdominal pain, especially in the area just above your navel    · Bitter or acid taste in your mouth    · Trouble swallowing    · Coughing or hoarseness    · Chest pain or shortness of breath that occurs after eating    · Frequent burping or hiccups    · Uncomfortable feeling of fullness after eating  How is a hiatal hernia diagnosed? · An upper GI series test  includes x-rays of your esophagus, stomach, and your small intestines  It is also called a barium swallow test  You will be given barium (a chalky liquid) to drink before the pictures are taken  This liquid helps your stomach and intestines show up better on the x-rays  An upper GI series can show if you have an ulcer, a blocked intestine, or other problems  · An endoscopy  uses a scope to see the inside of your digestive tract  A scope is a long, bendable tube with a light on the end of it  A camera may be hooked to the scope to take pictures  How is a hiatal hernia treated?   Treatment depends on the type of hiatal hernia you have and on your symptoms  You may not need any treatment  You may need any of the following:  · Medicines  may be given to relieve heartburn symptoms  These medicines help to decrease or block stomach acid  You may also be given medicines that help to tighten the esophageal sphincter  · Surgery  may be done when medicines cannot control your symptoms, or other problems are present  Your healthcare provider may also suggest surgery depending on the type of hernia you have  Your healthcare provider can put your stomach back into its normal location  He may make the hiatus (hole) smaller and anchor your stomach in your abdomen  Fundoplication is a surgery that wraps the upper part of the stomach around the esophageal sphincter to strengthen it  How can I manage symptoms? The following nutrition and lifestyle changes may be recommended to relieve symptoms of heartburn  · Avoid foods that make your symptoms worse  These may include spicy foods, fruit juices, alcohol, caffeine, chocolate, and mint  · Eat several small meals during the day  Small meals give your stomach less food to digest     · Avoid lying down and bending forward after you eat  Do not eat meals 2 to 3 hours before bedtime  This decreases your risk for reflux  · Maintain a healthy weight  If you are overweight, weight loss may help relieve your symptoms  · Sleep with your head elevated  at least 6 inches  · Do not smoke  Smoking can increase your symptoms of heartburn  When should I seek immediate care? · You have severe abdominal pain  · You try to vomit but nothing comes out (retching)  · You have severe chest pain and sudden trouble breathing  · Your bowel movements are black or bloody  · Your vomit looks like coffee grounds or has blood in it  When should I contact my healthcare provider? · Your symptoms are getting worse  · You have nausea, and you are vomiting      · You are losing weight without trying  · You have questions or concerns about your condition or care  CARE AGREEMENT:   You have the right to help plan your care  Learn about your health condition and how it may be treated  Discuss treatment options with your caregivers to decide what care you want to receive  You always have the right to refuse treatment  The above information is an  only  It is not intended as medical advice for individual conditions or treatments  Talk to your doctor, nurse or pharmacist before following any medical regimen to see if it is safe and effective for you  © 2017 [address]  Information is for End User's use only and may not be sold, redistributed or otherwise used for commercial purposes  All illustrations and images included in CareNotes® are the copyrighted property of [employer]  or [employer] Morris

## 2020-08-20 NOTE — ANESTHESIA POSTPROCEDURE EVALUATION
Post-Op Assessment Note    CV Status:  Stable  Pain Score: 0    Pain management: adequate     Mental Status:  Arousable   Hydration Status:  Stable   PONV Controlled:  None   Airway Patency:  Patent      Post Op Vitals Reviewed: Yes      Staff: CRNA         No complications documented      BP   86/60   Temp     Pulse  109   Resp   20   SpO2   100

## 2020-08-20 NOTE — ANESTHESIA PREPROCEDURE EVALUATION
Procedure:  EGD  COLONOSCOPY    Relevant Problems   GI/HEPATIC   (+) GERD (gastroesophageal reflux disease)   (+) Hiatal hernia      Other   (+) Cancer (HCC)   (+) Crohn's disease ([facility] )   (+) Encounter for care related to vascular access port   h/o thyroid and vulvar cancer, port in place  Pt requesting that we access port rather than starting a peripheral IV    H/o cervical spine fusion    H/o smoking quit 2007    Physical Exam    Airway  Comment: Limited neck extension due to h/o cervical spine fusion    TM Distance: <3 FB  Neck ROM: limited     Dental   Comment: Denies loose/chipped teeth,     Cardiovascular  Cardiovascular exam normal    Pulmonary  Pulmonary exam normal     Other Findings        Anesthesia Plan  ASA Score- 3     Anesthesia Type- IV sedation with anesthesia with ASA Monitors  Additional Monitors:   Airway Plan:           Plan Factors-    Chart reviewed  Patient summary reviewed  Patient is not a current smoker  Induction- intravenous  Postoperative Plan-     Informed Consent- Anesthetic plan and risks discussed with patient  I personally reviewed this patient with the CRNA  Discussed and agreed on the Anesthesia Plan with the CRNA  Meenakshi Negro

## 2020-08-20 NOTE — INTERVAL H&P NOTE
H&P reviewed  After examining the patient I find no changes in the patients condition since the H&P had been written      Vitals:    08/20/20 0952   BP: 111/78   Pulse: 71   Resp: 16   Temp: (!) 97 4 °F (36 3 °C)   SpO2: 97%

## 2020-08-25 ENCOUNTER — HOSPITAL ENCOUNTER (OUTPATIENT)
Dept: SURGERY | Facility: HOSPITAL | Age: 55
Discharge: HOME | End: 2020-08-25
Payer: MEDICARE

## 2020-08-25 VITALS
SYSTOLIC BLOOD PRESSURE: 119 MMHG | BODY MASS INDEX: 18.31 KG/M2 | OXYGEN SATURATION: 98 % | HEIGHT: 61 IN | TEMPERATURE: 97.8 F | DIASTOLIC BLOOD PRESSURE: 76 MMHG | WEIGHT: 97 LBS | HEART RATE: 76 BPM | RESPIRATION RATE: 18 BRPM

## 2020-08-25 PROCEDURE — 96413 CHEMO IV INFUSION 1 HR: CPT

## 2020-08-25 RX ADMIN — VEDOLIZUMAB 300 MG: 300 INJECTION, POWDER, LYOPHILIZED, FOR SOLUTION INTRAVENOUS at 09:12

## 2020-09-23 ENCOUNTER — TRANSCRIBE ORDERS (OUTPATIENT)
Dept: ADMINISTRATIVE | Facility: HOSPITAL | Age: 55
End: 2020-09-23

## 2020-09-23 DIAGNOSIS — R19.7 DIARRHEA OF PRESUMED INFECTIOUS ORIGIN: Primary | ICD-10-CM

## 2020-09-23 DIAGNOSIS — K50.018 CROHN'S DISEASE OF SMALL INTESTINE WITH OTHER COMPLICATION (HCC): ICD-10-CM

## 2020-09-23 DIAGNOSIS — R19.5 ABNORMAL FECES: ICD-10-CM

## 2020-09-23 DIAGNOSIS — R10.9 STOMACH ACHE: ICD-10-CM

## 2020-09-23 DIAGNOSIS — E55.9 VITAMIN D DEFICIENCY: ICD-10-CM

## 2020-09-23 DIAGNOSIS — R63.4 LOSS OF WEIGHT: ICD-10-CM

## 2020-09-23 DIAGNOSIS — A08.11 EPIDEMIC VOMITING SYNDROME: ICD-10-CM

## 2020-09-28 ENCOUNTER — TRANSCRIBE ORDERS (OUTPATIENT)
Dept: ADMINISTRATIVE | Facility: HOSPITAL | Age: 55
End: 2020-09-28

## 2020-10-01 ENCOUNTER — HOSPITAL ENCOUNTER (OUTPATIENT)
Dept: MRI IMAGING | Facility: HOSPITAL | Age: 55
Discharge: HOME | End: 2020-10-01
Payer: MEDICARE

## 2020-10-01 DIAGNOSIS — R63.4 LOSS OF WEIGHT: ICD-10-CM

## 2020-10-01 DIAGNOSIS — K50.018 CROHN'S DISEASE OF SMALL INTESTINE WITH OTHER COMPLICATION (HCC): ICD-10-CM

## 2020-10-01 DIAGNOSIS — R19.5 ABNORMAL FECES: ICD-10-CM

## 2020-10-01 DIAGNOSIS — R19.7 DIARRHEA OF PRESUMED INFECTIOUS ORIGIN: ICD-10-CM

## 2020-10-01 DIAGNOSIS — R10.9 STOMACH ACHE: ICD-10-CM

## 2020-10-01 DIAGNOSIS — E55.9 VITAMIN D DEFICIENCY: ICD-10-CM

## 2020-10-01 DIAGNOSIS — A08.11 EPIDEMIC VOMITING SYNDROME: ICD-10-CM

## 2020-10-01 PROCEDURE — 72197 MRI PELVIS W/O & W/DYE: CPT

## 2020-10-01 PROCEDURE — A9585 GADOBUTROL INJECTION: HCPCS | Performed by: PHYSICIAN ASSISTANT

## 2020-10-01 PROCEDURE — 74183 MRI ABD W/O CNTR FLWD CNTR: CPT

## 2020-10-01 PROCEDURE — G1004 CDSM NDSC: HCPCS

## 2020-10-01 RX ADMIN — GADOBUTROL 6 ML: 604.72 INJECTION INTRAVENOUS at 16:18

## 2020-10-09 ENCOUNTER — HOSPITAL ENCOUNTER (OUTPATIENT)
Dept: INFUSION CENTER | Facility: HOSPITAL | Age: 55
Discharge: HOME | End: 2020-10-09
Payer: MEDICARE

## 2020-10-09 VITALS
RESPIRATION RATE: 18 BRPM | SYSTOLIC BLOOD PRESSURE: 126 MMHG | TEMPERATURE: 98.9 F | HEART RATE: 70 BPM | DIASTOLIC BLOOD PRESSURE: 68 MMHG

## 2020-10-09 LAB
T4 FREE SERPL-MCNC: 1.11 NG/DL (ref 0.76–1.46)
TSH SERPL DL<=0.05 MIU/L-ACNC: 7.96 UIU/ML (ref 0.45–5.33)

## 2020-10-09 PROCEDURE — 84439 ASSAY OF FREE THYROXINE: CPT | Performed by: INTERNAL MEDICINE

## 2020-10-09 PROCEDURE — 84443 ASSAY THYROID STIM HORMONE: CPT | Performed by: INTERNAL MEDICINE

## 2020-10-09 PROCEDURE — 96413 CHEMO IV INFUSION 1 HR: CPT

## 2020-10-09 RX ORDER — SODIUM CHLORIDE 9 MG/ML
20 INJECTION, SOLUTION INTRAVENOUS CONTINUOUS
Status: DISCONTINUED | OUTPATIENT
Start: 2020-10-09 | End: 2020-10-13 | Stop reason: HOSPADM

## 2020-10-09 RX ORDER — DIPHENHYDRAMINE HCL 25 MG
25 TABLET ORAL ONCE
Status: DISCONTINUED | OUTPATIENT
Start: 2020-10-09 | End: 2020-10-13 | Stop reason: HOSPADM

## 2020-10-09 RX ORDER — ACETAMINOPHEN 325 MG/1
650 TABLET ORAL ONCE
Status: DISCONTINUED | OUTPATIENT
Start: 2020-10-09 | End: 2020-10-13 | Stop reason: HOSPADM

## 2020-10-09 RX ADMIN — VEDOLIZUMAB 300 MG: 300 INJECTION, POWDER, LYOPHILIZED, FOR SOLUTION INTRAVENOUS at 14:06

## 2020-10-09 RX ADMIN — SODIUM CHLORIDE 20 ML/HR: 0.9 INJECTION, SOLUTION INTRAVENOUS at 14:00

## 2020-11-19 RX ORDER — ACETAMINOPHEN 325 MG/1
650 TABLET ORAL ONCE
Status: DISCONTINUED | OUTPATIENT
Start: 2020-11-20 | End: 2020-11-24 | Stop reason: HOSPADM

## 2020-11-19 RX ORDER — DIPHENHYDRAMINE HCL 25 MG
25 TABLET ORAL ONCE
Status: DISCONTINUED | OUTPATIENT
Start: 2020-11-20 | End: 2020-11-24 | Stop reason: HOSPADM

## 2020-11-19 RX ORDER — SODIUM CHLORIDE 9 MG/ML
20 INJECTION, SOLUTION INTRAVENOUS ONCE
Status: COMPLETED | OUTPATIENT
Start: 2020-11-20 | End: 2020-11-20

## 2020-11-20 ENCOUNTER — HOSPITAL ENCOUNTER (OUTPATIENT)
Dept: INFUSION CENTER | Facility: HOSPITAL | Age: 55
Discharge: HOME | End: 2020-11-20
Payer: MEDICARE

## 2020-11-20 VITALS
OXYGEN SATURATION: 97 % | TEMPERATURE: 97.8 F | SYSTOLIC BLOOD PRESSURE: 144 MMHG | RESPIRATION RATE: 18 BRPM | DIASTOLIC BLOOD PRESSURE: 83 MMHG | HEART RATE: 60 BPM

## 2020-11-20 PROCEDURE — 96413 CHEMO IV INFUSION 1 HR: CPT

## 2020-11-20 RX ADMIN — VEDOLIZUMAB 300 MG: 300 INJECTION, POWDER, LYOPHILIZED, FOR SOLUTION INTRAVENOUS at 09:32

## 2020-11-20 RX ADMIN — SODIUM CHLORIDE 20 ML/HR: 0.9 INJECTION, SOLUTION INTRAVENOUS at 09:32

## 2020-12-07 ENCOUNTER — APPOINTMENT (OUTPATIENT)
Dept: LAB | Facility: HOSPITAL | Age: 55
End: 2020-12-07
Attending: INTERNAL MEDICINE
Payer: MEDICARE

## 2020-12-07 ENCOUNTER — TRANSCRIBE ORDERS (OUTPATIENT)
Dept: LAB | Facility: HOSPITAL | Age: 55
End: 2020-12-07

## 2020-12-07 DIAGNOSIS — I51.9 MYXEDEMA HEART DISEASE: ICD-10-CM

## 2020-12-07 DIAGNOSIS — R94.5 ABNORMAL RESULTS OF LIVER FUNCTION STUDIES: ICD-10-CM

## 2020-12-07 DIAGNOSIS — I51.9 MYXEDEMA HEART DISEASE: Primary | ICD-10-CM

## 2020-12-07 DIAGNOSIS — E03.9 MYXEDEMA HEART DISEASE: Primary | ICD-10-CM

## 2020-12-07 DIAGNOSIS — R19.7 DIARRHEA, UNSPECIFIED TYPE: ICD-10-CM

## 2020-12-07 DIAGNOSIS — E03.9 MYXEDEMA HEART DISEASE: ICD-10-CM

## 2020-12-07 DIAGNOSIS — E78.5 HYPERLIPIDEMIA, UNSPECIFIED HYPERLIPIDEMIA TYPE: ICD-10-CM

## 2020-12-07 LAB
25(OH)D3 SERPL-MCNC: 62 NG/ML (ref 30–100)
ALBUMIN SERPL BCP-MCNC: 4.5 G/DL (ref 3.5–5.7)
ALP SERPL-CCNC: 81 U/L (ref 40–150)
ALT SERPL W P-5'-P-CCNC: 14 U/L (ref 7–52)
ANION GAP SERPL CALCULATED.3IONS-SCNC: 8 MMOL/L (ref 4–13)
AST SERPL W P-5'-P-CCNC: 19 U/L (ref 13–39)
BASOPHILS # BLD AUTO: 0 THOUSANDS/ΜL (ref 0–0.1)
BASOPHILS NFR BLD AUTO: 1 % (ref 0–2)
BILIRUB DIRECT SERPL-MCNC: 0 MG/DL (ref 0–0.2)
BILIRUB SERPL-MCNC: 0.4 MG/DL (ref 0.2–1)
BILIRUB UR QL STRIP: NEGATIVE
BUN SERPL-MCNC: 8 MG/DL (ref 7–25)
CALCIUM SERPL-MCNC: 9.8 MG/DL (ref 8.6–10.5)
CHLORIDE SERPL-SCNC: 99 MMOL/L (ref 98–107)
CHOLEST SERPL-MCNC: 189 MG/DL (ref 0–200)
CLARITY UR: CLEAR
CO2 SERPL-SCNC: 32 MMOL/L (ref 21–31)
COLOR UR: ABNORMAL
CREAT SERPL-MCNC: 0.67 MG/DL (ref 0.6–1.2)
CRP SERPL QL: <5 MG/L
EOSINOPHIL # BLD AUTO: 0.1 THOUSAND/ΜL (ref 0–0.61)
EOSINOPHIL NFR BLD AUTO: 1 % (ref 0–5)
ERYTHROCYTE [DISTWIDTH] IN BLOOD BY AUTOMATED COUNT: 13.4 % (ref 11.5–14.5)
ERYTHROCYTE [SEDIMENTATION RATE] IN BLOOD: 27 MM/HOUR (ref 0–29)
GFR SERPL CREATININE-BSD FRML MDRD: 99 ML/MIN/1.73SQ M
GLUCOSE SERPL-MCNC: 96 MG/DL (ref 65–99)
GLUCOSE UR STRIP-MCNC: NEGATIVE MG/DL
HCT VFR BLD AUTO: 43.7 % (ref 42–47)
HDLC SERPL-MCNC: 68 MG/DL
HGB BLD-MCNC: 14.4 G/DL (ref 12–16)
HGB UR QL STRIP.AUTO: NEGATIVE
KETONES UR STRIP-MCNC: NEGATIVE MG/DL
LDLC SERPL CALC-MCNC: 93 MG/DL (ref 0–100)
LEUKOCYTE ESTERASE UR QL STRIP: NEGATIVE
LYMPHOCYTES # BLD AUTO: 2.3 THOUSANDS/ΜL (ref 0.6–4.47)
LYMPHOCYTES NFR BLD AUTO: 29 % (ref 21–51)
MCH RBC QN AUTO: 29.9 PG (ref 26–34)
MCHC RBC AUTO-ENTMCNC: 32.9 G/DL (ref 31–37)
MCV RBC AUTO: 91 FL (ref 81–99)
MONOCYTES # BLD AUTO: 0.6 THOUSAND/ΜL (ref 0.17–1.22)
MONOCYTES NFR BLD AUTO: 8 % (ref 2–12)
NEUTROPHILS # BLD AUTO: 4.8 THOUSANDS/ΜL (ref 1.4–6.5)
NEUTS SEG NFR BLD AUTO: 61 % (ref 42–75)
NITRITE UR QL STRIP: NEGATIVE
NONHDLC SERPL-MCNC: 121 MG/DL
PH UR STRIP.AUTO: 6.5 [PH]
PLATELET # BLD AUTO: 269 THOUSANDS/UL (ref 149–390)
PMV BLD AUTO: 10 FL (ref 8.6–11.7)
POTASSIUM SERPL-SCNC: 4.1 MMOL/L (ref 3.5–5.5)
PROT SERPL-MCNC: 7.9 G/DL (ref 6.4–8.9)
PROT UR STRIP-MCNC: NEGATIVE MG/DL
RBC # BLD AUTO: 4.81 MILLION/UL (ref 3.9–5.2)
SODIUM SERPL-SCNC: 139 MMOL/L (ref 134–143)
SP GR UR STRIP.AUTO: <=1.005 (ref 1–1.03)
T4 FREE SERPL-MCNC: 1.52 NG/DL (ref 0.76–1.46)
TRIGL SERPL-MCNC: 139 MG/DL (ref 44–166)
TSH SERPL DL<=0.05 MIU/L-ACNC: 0.58 UIU/ML (ref 0.45–5.33)
UROBILINOGEN UR QL STRIP.AUTO: 0.2 E.U./DL
WBC # BLD AUTO: 7.8 THOUSAND/UL (ref 4.8–10.8)

## 2020-12-07 PROCEDURE — 84439 ASSAY OF FREE THYROXINE: CPT

## 2020-12-07 PROCEDURE — 85025 COMPLETE CBC W/AUTO DIFF WBC: CPT

## 2020-12-07 PROCEDURE — 85652 RBC SED RATE AUTOMATED: CPT

## 2020-12-07 PROCEDURE — 82306 VITAMIN D 25 HYDROXY: CPT

## 2020-12-07 PROCEDURE — 80053 COMPREHEN METABOLIC PANEL: CPT

## 2020-12-07 PROCEDURE — 81003 URINALYSIS AUTO W/O SCOPE: CPT

## 2020-12-07 PROCEDURE — 82248 BILIRUBIN DIRECT: CPT

## 2020-12-07 PROCEDURE — 84443 ASSAY THYROID STIM HORMONE: CPT

## 2020-12-07 PROCEDURE — 86140 C-REACTIVE PROTEIN: CPT

## 2020-12-07 PROCEDURE — 36415 COLL VENOUS BLD VENIPUNCTURE: CPT

## 2020-12-07 PROCEDURE — 80061 LIPID PANEL: CPT

## 2020-12-30 RX ORDER — SODIUM CHLORIDE 9 MG/ML
20 INJECTION, SOLUTION INTRAVENOUS ONCE
Status: COMPLETED | OUTPATIENT
Start: 2021-01-04 | End: 2021-01-04

## 2020-12-30 RX ORDER — ACETAMINOPHEN 325 MG/1
650 TABLET ORAL ONCE
Status: DISCONTINUED | OUTPATIENT
Start: 2021-01-04 | End: 2021-01-08 | Stop reason: HOSPADM

## 2020-12-30 RX ORDER — DIPHENHYDRAMINE HCL 25 MG
25 TABLET ORAL ONCE
Status: DISCONTINUED | OUTPATIENT
Start: 2021-01-04 | End: 2021-01-08 | Stop reason: HOSPADM

## 2021-01-04 ENCOUNTER — HOSPITAL ENCOUNTER (OUTPATIENT)
Dept: INFUSION CENTER | Facility: HOSPITAL | Age: 56
Discharge: HOME | End: 2021-01-04
Payer: MEDICARE

## 2021-01-04 VITALS
DIASTOLIC BLOOD PRESSURE: 72 MMHG | RESPIRATION RATE: 16 BRPM | SYSTOLIC BLOOD PRESSURE: 151 MMHG | HEART RATE: 61 BPM | TEMPERATURE: 97.5 F

## 2021-01-04 PROCEDURE — 96413 CHEMO IV INFUSION 1 HR: CPT

## 2021-01-04 RX ADMIN — VEDOLIZUMAB 300 MG: 300 INJECTION, POWDER, LYOPHILIZED, FOR SOLUTION INTRAVENOUS at 11:48

## 2021-01-04 RX ADMIN — SODIUM CHLORIDE 20 ML/HR: 0.9 INJECTION, SOLUTION INTRAVENOUS at 11:22

## 2021-01-04 NOTE — PROGRESS NOTES
Patient to the unit for Entyvio infusion  Tolerated without adverse reaction  Declined AVS, aware of future appointment  Voices no questions or concerns at discharge

## 2021-01-04 NOTE — PROGRESS NOTES
Pt tolerated entyvio well  No adverse reactions noted  Port flushed and deaccessed per routine  Has next appt scheduled   Dc ambulatory with avs

## 2021-02-11 RX ORDER — SODIUM CHLORIDE 9 MG/ML
20 INJECTION, SOLUTION INTRAVENOUS ONCE
Status: COMPLETED | OUTPATIENT
Start: 2021-02-15 | End: 2021-02-15

## 2021-02-11 RX ORDER — DIPHENHYDRAMINE HCL 25 MG
25 TABLET ORAL ONCE
Status: DISCONTINUED | OUTPATIENT
Start: 2021-02-15 | End: 2021-02-19 | Stop reason: HOSPADM

## 2021-02-11 RX ORDER — ACETAMINOPHEN 325 MG/1
650 TABLET ORAL ONCE
Status: DISCONTINUED | OUTPATIENT
Start: 2021-02-15 | End: 2021-02-19 | Stop reason: HOSPADM

## 2021-02-15 ENCOUNTER — HOSPITAL ENCOUNTER (OUTPATIENT)
Dept: INFUSION CENTER | Facility: HOSPITAL | Age: 56
Discharge: HOME | End: 2021-02-15
Payer: MEDICARE

## 2021-02-15 VITALS
TEMPERATURE: 97.1 F | DIASTOLIC BLOOD PRESSURE: 87 MMHG | SYSTOLIC BLOOD PRESSURE: 151 MMHG | RESPIRATION RATE: 18 BRPM | HEART RATE: 53 BPM

## 2021-02-15 PROCEDURE — 96413 CHEMO IV INFUSION 1 HR: CPT

## 2021-02-15 RX ADMIN — SODIUM CHLORIDE 20 ML/HR: 0.9 INJECTION, SOLUTION INTRAVENOUS at 11:10

## 2021-02-15 RX ADMIN — VEDOLIZUMAB 300 MG: 300 INJECTION, POWDER, LYOPHILIZED, FOR SOLUTION INTRAVENOUS at 11:44

## 2021-02-15 NOTE — PLAN OF CARE
Problem: Potential for Falls  Goal: Patient will remain free of falls  Description: INTERVENTIONS:  - Assess patient frequently for physical needs  -  Identify cognitive and physical deficits and behaviors that affect risk of falls  -  [place] fall precautions as indicated by assessment   - Educate patient/family on patient safety including physical limitations  - Instruct patient to call for assistance with activity based on assessment  - Modify environment to reduce risk of injury  - Consider OT/PT consult to assist with strengthening/mobility  Outcome: Progressing     Problem: Knowledge Deficit  Goal: Patient/family/caregiver demonstrates understanding of disease process, treatment plan, medications, and discharge instructions  Description: Complete learning assessment and assess knowledge base    Interventions:  - Provide teaching at level of understanding  - Provide teaching via preferred learning methods  Outcome: Progressing

## 2021-02-15 NOTE — PROGRESS NOTES
Pt tolerated Entyvio infusion without complication  Declined po premeds today  Port flushed and deaccessed per protocol  Next appt scheduled    Left ambulatory with AVS

## 2021-03-10 DIAGNOSIS — Z23 ENCOUNTER FOR IMMUNIZATION: ICD-10-CM

## 2021-03-19 ENCOUNTER — IMMUNIZATIONS (OUTPATIENT)
Dept: FAMILY MEDICINE CLINIC | Facility: HOSPITAL | Age: 56
End: 2021-03-19

## 2021-03-19 DIAGNOSIS — Z23 ENCOUNTER FOR IMMUNIZATION: Primary | ICD-10-CM

## 2021-03-19 PROCEDURE — 91301 SARS-COV-2 / COVID-19 MRNA VACCINE (MODERNA) 100 MCG: CPT

## 2021-03-19 PROCEDURE — 0011A SARS-COV-2 / COVID-19 MRNA VACCINE (MODERNA) 100 MCG: CPT

## 2021-03-29 RX ORDER — SODIUM CHLORIDE 9 MG/ML
20 INJECTION, SOLUTION INTRAVENOUS ONCE
Status: COMPLETED | OUTPATIENT
Start: 2021-03-30 | End: 2021-03-30

## 2021-03-29 RX ORDER — DIPHENHYDRAMINE HCL 25 MG
25 TABLET ORAL ONCE
Status: DISCONTINUED | OUTPATIENT
Start: 2021-03-30 | End: 2021-04-03 | Stop reason: HOSPADM

## 2021-03-29 RX ORDER — ACETAMINOPHEN 325 MG/1
650 TABLET ORAL ONCE
Status: DISCONTINUED | OUTPATIENT
Start: 2021-03-30 | End: 2021-04-03 | Stop reason: HOSPADM

## 2021-03-30 ENCOUNTER — HOSPITAL ENCOUNTER (OUTPATIENT)
Dept: INFUSION CENTER | Facility: HOSPITAL | Age: 56
Discharge: HOME | End: 2021-03-30
Payer: MEDICARE

## 2021-03-30 VITALS
DIASTOLIC BLOOD PRESSURE: 59 MMHG | RESPIRATION RATE: 18 BRPM | TEMPERATURE: 97.6 F | SYSTOLIC BLOOD PRESSURE: 129 MMHG | HEART RATE: 76 BPM

## 2021-03-30 PROCEDURE — 96413 CHEMO IV INFUSION 1 HR: CPT

## 2021-03-30 RX ADMIN — SODIUM CHLORIDE 20 ML/HR: 0.9 INJECTION, SOLUTION INTRAVENOUS at 11:50

## 2021-03-30 RX ADMIN — VEDOLIZUMAB 300 MG: 300 INJECTION, POWDER, LYOPHILIZED, FOR SOLUTION INTRAVENOUS at 11:54

## 2021-04-08 ENCOUNTER — TRANSCRIBE ORDERS (OUTPATIENT)
Dept: URGENT CARE | Facility: CLINIC | Age: 56
End: 2021-04-08

## 2021-04-08 ENCOUNTER — APPOINTMENT (OUTPATIENT)
Dept: LAB | Facility: CLINIC | Age: 56
End: 2021-04-08
Payer: MEDICARE

## 2021-04-08 DIAGNOSIS — E03.9 HYPOTHYROIDISM, UNSPECIFIED TYPE: ICD-10-CM

## 2021-04-08 DIAGNOSIS — E03.9 HYPOTHYROIDISM, UNSPECIFIED TYPE: Primary | ICD-10-CM

## 2021-04-08 LAB
T4 FREE SERPL-MCNC: 0.78 NG/DL (ref 0.76–1.46)
TSH SERPL DL<=0.05 MIU/L-ACNC: 41.2 UIU/ML (ref 0.36–3.74)

## 2021-04-08 PROCEDURE — 84443 ASSAY THYROID STIM HORMONE: CPT

## 2021-04-08 PROCEDURE — 36415 COLL VENOUS BLD VENIPUNCTURE: CPT

## 2021-04-08 PROCEDURE — 84439 ASSAY OF FREE THYROXINE: CPT

## 2021-04-21 ENCOUNTER — IMMUNIZATIONS (OUTPATIENT)
Dept: FAMILY MEDICINE CLINIC | Facility: HOSPITAL | Age: 56
End: 2021-04-21

## 2021-04-21 DIAGNOSIS — Z23 ENCOUNTER FOR IMMUNIZATION: Primary | ICD-10-CM

## 2021-04-21 PROCEDURE — 0012A SARS-COV-2 / COVID-19 MRNA VACCINE (MODERNA) 100 MCG: CPT

## 2021-04-21 PROCEDURE — 91301 SARS-COV-2 / COVID-19 MRNA VACCINE (MODERNA) 100 MCG: CPT

## 2021-05-10 RX ORDER — SODIUM CHLORIDE 9 MG/ML
20 INJECTION, SOLUTION INTRAVENOUS CONTINUOUS
Status: DISCONTINUED | OUTPATIENT
Start: 2021-05-11 | End: 2021-05-15 | Stop reason: HOSPADM

## 2021-05-10 RX ORDER — ACETAMINOPHEN 325 MG/1
650 TABLET ORAL ONCE
Status: DISCONTINUED | OUTPATIENT
Start: 2021-05-11 | End: 2021-05-15 | Stop reason: HOSPADM

## 2021-05-10 RX ORDER — DIPHENHYDRAMINE HCL 25 MG
25 TABLET ORAL ONCE
Status: DISCONTINUED | OUTPATIENT
Start: 2021-05-11 | End: 2021-05-15 | Stop reason: HOSPADM

## 2021-05-11 ENCOUNTER — HOSPITAL ENCOUNTER (OUTPATIENT)
Dept: INFUSION CENTER | Facility: HOSPITAL | Age: 56
Discharge: HOME | End: 2021-05-11
Payer: MEDICARE

## 2021-05-11 VITALS — TEMPERATURE: 98.3 F | DIASTOLIC BLOOD PRESSURE: 83 MMHG | SYSTOLIC BLOOD PRESSURE: 114 MMHG | HEART RATE: 52 BPM

## 2021-05-11 PROCEDURE — 96413 CHEMO IV INFUSION 1 HR: CPT

## 2021-05-11 RX ADMIN — SODIUM CHLORIDE 20 ML/HR: 0.9 INJECTION, SOLUTION INTRAVENOUS at 09:37

## 2021-05-11 RX ADMIN — VEDOLIZUMAB 300 MG: 300 INJECTION, POWDER, LYOPHILIZED, FOR SOLUTION INTRAVENOUS at 09:54

## 2021-05-11 NOTE — PLAN OF CARE
Problem: Potential for Falls  Goal: Patient will remain free of falls  Description: INTERVENTIONS:  - Assess patient frequently for physical needs  -  Identify cognitive and physical deficits and behaviors that affect risk of falls    -  [place] fall precautions as indicated by assessment   - Educate patient/family on patient safety including physical limitations  - Instruct patient to call for assistance with activity based on assessment  - Modify environment to reduce risk of injury  - Consider OT/PT consult to assist with strengthening/mobility  Outcome: Progressing

## 2021-06-08 ENCOUNTER — TRANSCRIBE ORDERS (OUTPATIENT)
Dept: LAB | Facility: HOSPITAL | Age: 56
End: 2021-06-08

## 2021-06-08 ENCOUNTER — APPOINTMENT (OUTPATIENT)
Dept: LAB | Facility: HOSPITAL | Age: 56
End: 2021-06-08
Attending: INTERNAL MEDICINE
Payer: MEDICARE

## 2021-06-08 DIAGNOSIS — E03.9 HYPOTHYROIDISM, UNSPECIFIED TYPE: Primary | ICD-10-CM

## 2021-06-08 DIAGNOSIS — E03.9 HYPOTHYROIDISM, UNSPECIFIED TYPE: ICD-10-CM

## 2021-06-08 LAB
T4 FREE SERPL-MCNC: 0.68 NG/DL (ref 0.76–1.46)
TSH SERPL DL<=0.05 MIU/L-ACNC: 48.42 UIU/ML (ref 0.45–5.33)

## 2021-06-08 PROCEDURE — 84439 ASSAY OF FREE THYROXINE: CPT

## 2021-06-08 PROCEDURE — 84443 ASSAY THYROID STIM HORMONE: CPT

## 2021-06-08 PROCEDURE — 36415 COLL VENOUS BLD VENIPUNCTURE: CPT

## 2021-06-21 RX ORDER — ACETAMINOPHEN 325 MG/1
650 TABLET ORAL ONCE
Status: DISCONTINUED | OUTPATIENT
Start: 2021-06-22 | End: 2021-06-26 | Stop reason: HOSPADM

## 2021-06-21 RX ORDER — SODIUM CHLORIDE 9 MG/ML
20 INJECTION, SOLUTION INTRAVENOUS ONCE
Status: COMPLETED | OUTPATIENT
Start: 2021-06-22 | End: 2021-06-22

## 2021-06-21 RX ORDER — DIPHENHYDRAMINE HCL 25 MG
25 TABLET ORAL ONCE
Status: DISCONTINUED | OUTPATIENT
Start: 2021-06-22 | End: 2021-06-26 | Stop reason: HOSPADM

## 2021-06-22 ENCOUNTER — HOSPITAL ENCOUNTER (OUTPATIENT)
Dept: INFUSION CENTER | Facility: HOSPITAL | Age: 56
Discharge: HOME | End: 2021-06-22
Payer: MEDICARE

## 2021-06-22 VITALS
SYSTOLIC BLOOD PRESSURE: 134 MMHG | RESPIRATION RATE: 18 BRPM | DIASTOLIC BLOOD PRESSURE: 83 MMHG | HEART RATE: 52 BPM | TEMPERATURE: 97.5 F

## 2021-06-22 PROCEDURE — 96413 CHEMO IV INFUSION 1 HR: CPT

## 2021-06-22 RX ADMIN — VEDOLIZUMAB 300 MG: 300 INJECTION, POWDER, LYOPHILIZED, FOR SOLUTION INTRAVENOUS at 10:20

## 2021-06-22 RX ADMIN — SODIUM CHLORIDE 20 ML/HR: 0.9 INJECTION, SOLUTION INTRAVENOUS at 09:35

## 2021-06-30 ENCOUNTER — HOSPITAL ENCOUNTER (OUTPATIENT)
Dept: NON INVASIVE DIAGNOSTICS | Facility: HOSPITAL | Age: 56
Discharge: HOME | End: 2021-06-30
Payer: MEDICARE

## 2021-06-30 DIAGNOSIS — M79.661 PAIN IN RIGHT LOWER LEG: ICD-10-CM

## 2021-06-30 PROCEDURE — 93971 EXTREMITY STUDY: CPT

## 2021-06-30 PROCEDURE — 93971 EXTREMITY STUDY: CPT | Performed by: SURGERY

## 2021-07-08 ENCOUNTER — OFFICE VISIT (OUTPATIENT)
Dept: ENDOCRINOLOGY | Facility: CLINIC | Age: 56
End: 2021-07-08
Payer: MEDICARE

## 2021-07-08 VITALS
SYSTOLIC BLOOD PRESSURE: 106 MMHG | OXYGEN SATURATION: 99 % | WEIGHT: 97.6 LBS | HEART RATE: 69 BPM | DIASTOLIC BLOOD PRESSURE: 64 MMHG | BODY MASS INDEX: 19.16 KG/M2 | HEIGHT: 60 IN

## 2021-07-08 DIAGNOSIS — E89.0 POSTOPERATIVE HYPOTHYROIDISM: Primary | ICD-10-CM

## 2021-07-08 DIAGNOSIS — C73 MALIGNANT NEOPLASM OF THYROID GLAND (HCC): ICD-10-CM

## 2021-07-08 DIAGNOSIS — M81.0 AGE-RELATED OSTEOPOROSIS WITHOUT CURRENT PATHOLOGICAL FRACTURE: ICD-10-CM

## 2021-07-08 PROBLEM — D12.6 BENIGN NEOPLASM OF COLON: Status: ACTIVE | Noted: 2018-05-14

## 2021-07-08 PROBLEM — Z86.010 HISTORY OF COLONIC POLYPS: Status: ACTIVE | Noted: 2018-05-14

## 2021-07-08 PROBLEM — M54.2 CHRONIC NECK PAIN: Status: ACTIVE | Noted: 2018-06-13

## 2021-07-08 PROBLEM — K64.8 INTERNAL HEMORRHOIDS WITHOUT COMPLICATION: Status: ACTIVE | Noted: 2018-05-14

## 2021-07-08 PROBLEM — K57.30 DIVERTICULAR DISEASE OF COLON: Status: ACTIVE | Noted: 2018-05-14

## 2021-07-08 PROBLEM — M51.34 DEGENERATION OF THORACIC INTERVERTEBRAL DISC: Status: ACTIVE | Noted: 2019-02-14

## 2021-07-08 PROBLEM — E53.8 COBALAMIN DEFICIENCY: Status: ACTIVE | Noted: 2018-05-14

## 2021-07-08 PROBLEM — G47.9 SLEEP DISORDER: Status: ACTIVE | Noted: 2019-12-12

## 2021-07-08 PROBLEM — N95.1 SYMPTOMATIC MENOPAUSAL OR FEMALE CLIMACTERIC STATES: Status: ACTIVE | Noted: 2019-01-18

## 2021-07-08 PROBLEM — G89.29 CHRONIC NECK PAIN: Status: ACTIVE | Noted: 2018-06-13

## 2021-07-08 PROBLEM — K50.10 CROHN'S DISEASE OF COLON (HCC): Status: ACTIVE | Noted: 2018-05-14

## 2021-07-08 PROBLEM — Z86.0100 HISTORY OF COLONIC POLYPS: Status: ACTIVE | Noted: 2018-05-14

## 2021-07-08 PROCEDURE — 99204 OFFICE O/P NEW MOD 45 MIN: CPT | Performed by: NURSE PRACTITIONER

## 2021-07-08 RX ORDER — SUCRALFATE 1 G/1
TABLET ORAL
COMMUNITY
Start: 2021-05-23

## 2021-07-08 RX ORDER — OMEPRAZOLE 20 MG/1
40 CAPSULE, DELAYED RELEASE ORAL
COMMUNITY

## 2021-07-08 NOTE — PROGRESS NOTES
New Patient Progress Note       Chief Complaint   Patient presents with    Hypothyroidism        History of Present Illness:     Mario Aviles is a 54 y o  female with a history of thyroid CA s/p thyroidectomy and postsurgical hypothyroidism presenting to the office today to establish care  Patient had been following with [facility] sporadically starting in 2010  Last appointment was in 2014  PMH reviewed from history from 3/12/2012    She underwent a total thyroidectomy in August of 2007  1 8 cm L papillary carcinoma without evidence of lymph node or vascular invasion  No report of lymph node dissection  In November 2007, treated with 100 mCi I131  Uptake noted in upper and lower neck  TH level at time of scan was not performed  11/08 Thyrogen testing was negative  2/2012 LN mapping negative and TG antibody negative  2/2013 and 2/2014 TG < 0 2      Patient has diagnoses of Crohn's disease, for which she is receiving infusions of Entyvio and osteoporosis via DEXA scan completed on 2/22/2019 with T score of -2 5 of lumbar spine  [address]  2007 thyroidectomy  2011 Cervical disc disease -  Laparoscopic -titanium plates plaque/screws  Total abdominal hysterectomy 2013     At present, patient is taking 100 mcg of levothyroxine daily  This was increased on June 9th from 100 mcg M-F and 88 mcg on the weekends  Patient is taking her LT4 at 0200 daily when she wakes up  She takes it consistently  It is  by 4 hours from her PPI and vitamins  However, she is taking her carafate between 11pm and midnight, leaving only 2-3 hours between carafate and levothyroxine  Latest thyroid function test drawn on 06/08/2021  TSH is elevated at 48 4 to with a low free T4 of 0 68      Component      Latest Ref Rng & Units 12/7/2020 4/8/2021 6/8/2021   TSH 3RD GENERATON      0 450 - 5 330 uIU/mL 0 580 41 200 (H) 48 420 (H)     Component      Latest Ref Rng & Units 12/7/2020 4/8/2021 6/8/2021   Free T4 0 76 - 1 46 ng/dL 1 52 (H) 0 78 0 68 (L)       Reported symptoms:   +cold intolerance  + fatigue  + hair loss  + dry, flaky skin    For osteoporosis, she is currently supplementing with Vit D 2,000 iu daily and 2 Tums daily- unsure of Ca+ concentration  First diagnosed in 2019 via DEXA scan     Osteoporosis: Calcium and Vit D    Onset 47 yo  History of fragility fractures: negative  Hardware in hip or spine/spinal surgery: + 2011 Cervical disc disease;  Laparoscopic -titanium plates plaque/screws  Severe DJD: +  History of HRT use: "for maybe 4 years"      Risk Factors:  Early menopause: Partial hysterectomy 2013; unsure of onset of menopause  Family history of osteoporosis: + mother  Rheumatoid arthritis: -  Glucocorticoid use: -  PPI use: + > 10 years  Smoking: former 7 5 pack/year  Alcohol: rarely    Dietary Calcium intake: patient has some form of dairy daily, green leafy vegetables  Calcium/Vitamin D supplementation:  Weight bearing exercises: daily    Patient Active Problem List   Diagnosis    Biliary dyskinesia    Gallbladder polyp    Crohn's disease ([place] [address] )    Encounter for care related to vascular access port    Cancer ([place] [address] )    GERD (gastroesophageal reflux disease)    Hiatal hernia    Benign neoplasm of colon    Chronic neck pain    Cobalamin deficiency    [facility]    Degeneration of thoracic intervertebral disc    Diverticular disease of colon    History of colonic polyps    Internal hemorrhoids without complication    Malignant ne[facility] gland ([facility] )    Symptomatic menopausal or female climacteric states    Osteoporosis    Postoperative hypothyroidism    Sleep disorder    GEETHA III (vulvar intraepithelial neoplasia III)      Past Medical History:   Diagnosis Date    Cancer ([place] [address] )     thyroid,vulvar    Chronic pain disorder     Crohn's disease ([place] [address] )     Disease of thyroid gland     GERD (gastroesophageal reflux disease)     Hiatal hernia     Hyperlipidemia       Past Surgical History:   Procedure Laterality Date    CERVICAL FUSION      C 6-7    COLONOSCOPY  2004    2006,,,,2018    EGD  2012    EGD  2016    HYSTERECTOMY  2013    NECK SURGERY       plates and screws in neck bewtween 6-7    AL INSJ TUNNELED CTR VAD W/SUBQ PORT AGE 5 YR/> Left 10/10/2019    Procedure: INSERTION VENOUS PORT (PORT-A-CATH);   Surgeon: Barbara Thao MD;  Location: [address] MAIN OR;  Service: General    AL LAP,CHOLECYSTECTOMY/GRAPH N/A 2018    Procedure: LAPAROSCOPIC CHOLECYSTECTOMY WITH CHOLANGIOGRAM;  Surgeon: Barbara Thao MD;  Location: [address] MAIN OR;  Service: General    SIMPLE VULVECTOMY  2014    THYROIDECTOMY        Family History   Problem Relation Age of Onset    Crohn's disease Mother     Heart disease Father     No Known Problems Sister     No Known Problems Maternal Grandmother     No Known Problems Paternal Grandmother      Social History     Tobacco Use    Smoking status: Former Smoker     Packs/day: 0 50     Years: 15 00     Pack years: 7 50     Quit date: 2007     Years since quittin 5    Smokeless tobacco: Never Used   Substance Use Topics    Alcohol use: Yes     Comment: occ wine     Allergies   Allergen Reactions    Mercaptopurine Rash     Cancer occurrence--this medication is for Chrohn's disease    Penicillins Lightheadedness    Humira [Adalimumab] Rash    Remicade [Infliximab] Rash       Current Outpatient Medications:     ADVANCED EVENING PRIMROSE OIL PO, Take 2,000 mg by mouth daily , Disp: , Rfl:     Black Cohosh 40 MG CAPS, Take 1 capsule by mouth daily , Disp: , Rfl:     Calcium Carbonate Antacid (TUMS PO), Take 2 caplets by mouth daily , Disp: , Rfl:     Cholecalciferol (VITAMIN D) 2000 units CAPS, Take 2,000 Units by mouth daily, Disp: , Rfl:     levothyroxine (SYNTHROID) 75 mcg tablet, Take 100 mcg by mouth daily , Disp: , Rfl:     lidocaine-prilocaine (EMLA) cream, APPLY A SMALL AMOUNT OVER PORT SITE 30 60 MINUTES PRIOR TO USE, Disp: , Rfl: 1    Multiple Vitamin (MULTI VITAMIN DAILY PO), Take by mouth daily, Disp: , Rfl:     omeprazole (PriLOSEC) 20 mg delayed release capsule, 40 mg , Disp: , Rfl:     pravastatin (PRAVACHOL) 20 mg tablet, Take 20 mg by mouth daily, Disp: , Rfl:     sucralfate (CARAFATE) 1 g tablet, , Disp: , Rfl:     vedolizumab (ENTYVIO) 300 MG SOLR, Infuse 300 mg into a venous catheter 0,2,6 weeks and then every 6 weeks, Disp: , Rfl:     Review of Systems   Constitutional: Positive for fatigue  Negative for activity change, appetite change and unexpected weight change (difficulty maintaining weight)  HENT: Negative for sore throat and voice change  Eyes: Negative for visual disturbance  Respiratory: Negative for cough, chest tightness and shortness of breath  Cardiovascular: Negative for chest pain, palpitations and leg swelling  Gastrointestinal: Positive for abdominal pain and diarrhea  Negative for constipation, nausea and vomiting  Endocrine: Positive for cold intolerance and heat intolerance  Negative for polydipsia, polyphagia and polyuria  Genitourinary: Negative for frequency  Musculoskeletal: Positive for arthralgias and myalgias  Negative for back pain, gait problem and joint swelling  Skin: Negative for wound  Allergic/Immunologic: Positive for environmental allergies  Negative for food allergies  Neurological: Negative for dizziness, weakness, light-headedness, numbness and headaches  Hematological: Does not bruise/bleed easily  Psychiatric/Behavioral: Negative for decreased concentration, dysphoric mood and sleep disturbance  The patient is not nervous/anxious  Physical Exam:  Body mass index is 19 06 kg/m²    /64 (BP Location: Right arm, Cuff Size: Adult)   Pulse 69   Ht 5' (1 524 m)   Wt 44 3 kg (97 lb 9 6 oz)   SpO2 99%   BMI 19 06 kg/m²    Wt Readings from Last 3 Encounters:   07/08/21 44 3 kg (97 lb 9 6 oz) 08/25/20 44 kg (97 lb)   08/20/20 44 kg (97 lb)       Physical Exam  Vitals reviewed  Constitutional:       Appearance: She is well-developed and normal weight  HENT:      Head: Normocephalic and atraumatic  Comments: Mask in place  Eyes:      Pupils: Pupils are equal, round, and reactive to light  Neck:      Thyroid: No thyromegaly (thyroid surgically absent)  Cardiovascular:      Rate and Rhythm: Normal rate and regular rhythm  Pulses: Normal pulses  Heart sounds: Normal heart sounds  Pulmonary:      Effort: Pulmonary effort is normal       Breath sounds: Normal breath sounds  Abdominal:      General: Bowel sounds are normal  There is no distension  Palpations: Abdomen is soft  Tenderness: There is no abdominal tenderness  Musculoskeletal:      Cervical back: Normal range of motion and neck supple  Right lower leg: No edema  Left lower leg: No edema  Lymphadenopathy:      Cervical: No cervical adenopathy  Skin:     General: Skin is warm and dry  Capillary Refill: Capillary refill takes less than 2 seconds  Neurological:      Mental Status: She is alert and oriented to person, place, and time        Gait: Gait normal    Psychiatric:         Mood and Affect: Mood normal          Behavior: Behavior normal          Labs:       Lab Results   Component Value Date    CREATININE 0 67 12/07/2020    CREATININE 0 74 06/24/2020    CREATININE 0 74 12/10/2019    BUN 8 12/07/2020    K 4 1 12/07/2020    CL 99 12/07/2020    CO2 32 (H) 12/07/2020     eGFR   Date Value Ref Range Status   12/07/2020 99 ml/min/1 73sq m Final       Lab Results   Component Value Date    HDL 68 12/07/2020    TRIG 139 12/07/2020       Lab Results   Component Value Date    ALT 14 12/07/2020    AST 19 12/07/2020    ALKPHOS 81 12/07/2020       Lab Results   Component Value Date    FREET4 0 68 (L) 06/08/2021         Impression & Plan:    Problem List Items Addressed This Visit        Endocrine Malignant neoplasm of thyroid gland [facility])     She has been disease free for 14 years  Check thyroglobulin panel  Relevant Orders    Thyroglobulin w/ab Lab Collect    Postoperative hypothyroidism - Primary     As patient recently adjusted dose, continue with 100 mcg daily  She is due for blood work at the end of the month that her PCP has ordered  She will notify the office when she has her results and I will make appropriate medication adjustments  At this time, goal TSH is between 1 and 3 and free T4 is between 1 and 1 2  No need to suppressed TSH at this time  Reviewed pathophysiology of hypothyroidism, spefically post surgically  Recommended that she take her carafate at 9pm rather than midnight to provide adequate time between it and her levothyroxine  Musculoskeletal and Integument    Osteoporosis     Reviewed pathophysiology of osteoporosis  Discussed potential treatment options including oral bisphosphonates, IV bisphosphonate Reclast, or twice yearly Prolia injections  Reviewed mechanism of action, duration of therapy, as well as potential side effects of each  Increase calcium intake to 1200 mg daily  Continue with 2000 International Units use vitamin-D daily  Continue with weight-bearing exercise  Reviewed fall prevention strategies  Repeat DEXA scan  Given patient's GI history, would recommend IV Reclast or Prolia injections  Relevant Orders    DXA bone density spine hip and pelvis          Orders Placed This Encounter   Procedures    DXA bone density spine hip and pelvis     L forearm please     Standing Status:   Future     Standing Expiration Date:   7/8/2022     Scheduling Instructions:      Please wear comfortable clothing with no metal buttons, zippers, snaps or an underwire bra  Do not take any calcium supplements 24 hours prior to your test  Please bring your insurance cards, a form of photo ID and a list of your medications with you   Arrive 5-10 minutes prior to your appointment time in order to register  Your study cannot be performed if you take your calcium supplement 24 hours before the scheduled Dexa scan examination  To schedule this appointment, please contact Central Scheduling at [phone]  Order Specific Question:   Reason for Exam:     Answer:   osteoporosis, chron's, former smoker, PPI use     Order Specific Question:   Is the patient pregnant? Answer:   No    Thyroglobulin w/ab Lab Collect     Standing Status:   Future     Standing Expiration Date:   7/8/2022       Patient Instructions   1  Call the office after you have your labs drawn to let us know they are in   2  Take carafate earlier in the evening 9-10 pm   3  Check if your multi vitamin has biotin in it  If it does, stop taking 5 days prior to getting lab work done  4  Get a DEXA scan  5  Get gummy Ca+- take 1,200 mg daily        Discussed with the patient and all questioned fully answered  She will call me if any problems arise  Follow-up appointment in 6 months       Counseled patient on diagnostic results, prognosis, risk and benefit of treatment options, instruction for management, importance of treatment compliance, Risk  factor reduction and impressions      NICOLAS Chris

## 2021-07-08 NOTE — ASSESSMENT & PLAN NOTE
Reviewed pathophysiology of osteoporosis  Discussed potential treatment options including oral bisphosphonates, IV bisphosphonate Reclast, or twice yearly Prolia injections  Reviewed mechanism of action, duration of therapy, as well as potential side effects of each  Increase calcium intake to 1200 mg daily  Continue with 2000 International Units use vitamin-D daily  Continue with weight-bearing exercise  Reviewed fall prevention strategies  Repeat DEXA scan  Given patient's GI history, would recommend IV Reclast or Prolia injections

## 2021-07-08 NOTE — PATIENT INSTRUCTIONS
1  Call the office after you have your labs drawn to let us know they are in   2  Take carafate earlier in the evening 9-10 pm   3  Check if your multi vitamin has biotin in it  If it does, stop taking 5 days prior to getting lab work done  4  Get a DEXA scan  5   Get gummy Ca+- take 1,200 mg daily

## 2021-07-08 NOTE — ASSESSMENT & PLAN NOTE
As patient recently adjusted dose, continue with 100 mcg daily  She is due for blood work at the end of the month that her PCP has ordered  She will notify the office when she has her results and I will make appropriate medication adjustments  At this time, goal TSH is between 1 and 3 and free T4 is between 1 and 1 2  No need to suppressed TSH at this time  Reviewed pathophysiology of hypothyroidism, spefically post surgically  Recommended that she take her carafate at 9pm rather than midnight to provide adequate time between it and her levothyroxine

## 2021-07-22 ENCOUNTER — HOSPITAL ENCOUNTER (OUTPATIENT)
Dept: BONE DENSITY | Facility: HOSPITAL | Age: 56
Discharge: HOME | End: 2021-07-22
Payer: MEDICARE

## 2021-07-22 DIAGNOSIS — M81.0 AGE-RELATED OSTEOPOROSIS WITHOUT CURRENT PATHOLOGICAL FRACTURE: ICD-10-CM

## 2021-07-22 PROCEDURE — 77080 DXA BONE DENSITY AXIAL: CPT

## 2021-07-23 ENCOUNTER — TELEPHONE (OUTPATIENT)
Dept: ENDOCRINOLOGY | Facility: CLINIC | Age: 56
End: 2021-07-23

## 2021-07-23 NOTE — TELEPHONE ENCOUNTER
Called patient and made her aware  She did want to schedule an appointment to review options with the provider  Scheduled for September  PT also wanted to make us aware she is going to get her labs completed at the end of the month  If medication needs to be changed after review of those labs, she [facility]

## 2021-07-27 ENCOUNTER — APPOINTMENT (OUTPATIENT)
Dept: LAB | Facility: CLINIC | Age: 56
End: 2021-07-27
Payer: MEDICARE

## 2021-07-27 DIAGNOSIS — C73 MALIGNANT NEOPLASM OF THYROID GLAND (HCC): ICD-10-CM

## 2021-07-27 PROCEDURE — 36415 COLL VENOUS BLD VENIPUNCTURE: CPT

## 2021-07-27 PROCEDURE — 84432 ASSAY OF THYROGLOBULIN: CPT

## 2021-07-27 PROCEDURE — 86800 THYROGLOBULIN ANTIBODY: CPT

## 2021-07-28 LAB
THYROGLOB AB SERPL-ACNC: <1 IU/ML (ref 0–0.9)
THYROGLOB SERPL-MCNC: <0.1 NG/ML (ref 1.5–38.5)

## 2021-07-30 RX ORDER — SODIUM CHLORIDE 9 MG/ML
20 INJECTION, SOLUTION INTRAVENOUS ONCE
Status: COMPLETED | OUTPATIENT
Start: 2021-08-03 | End: 2021-08-03

## 2021-07-30 RX ORDER — DIPHENHYDRAMINE HCL 25 MG
25 TABLET ORAL ONCE
Status: DISCONTINUED | OUTPATIENT
Start: 2021-08-03 | End: 2021-08-07 | Stop reason: HOSPADM

## 2021-07-30 RX ORDER — ACETAMINOPHEN 325 MG/1
650 TABLET ORAL ONCE
Status: DISCONTINUED | OUTPATIENT
Start: 2021-08-03 | End: 2021-08-07 | Stop reason: HOSPADM

## 2021-08-02 ENCOUNTER — TELEPHONE (OUTPATIENT)
Dept: ENDOCRINOLOGY | Facility: CLINIC | Age: 56
End: 2021-08-02

## 2021-08-02 DIAGNOSIS — E89.0 POSTOPERATIVE HYPOTHYROIDISM: Primary | ICD-10-CM

## 2021-08-02 NOTE — TELEPHONE ENCOUNTER
Pt called to state when she had labs completed 7/27 she was to have TSH and T4 completed and she feels the lab did the wrong test  Explained the labs that Michael Odell ordered at her first visit 7/8/21 and that is the test that was completed  PT insists she MUST have a TSH and T4 now as "I am out of wack and not on the right dose of medication"   Aware I will have a provider review the most recent labs done 7/27 and see if they wasn her to have TSH and T4 now

## 2021-08-03 ENCOUNTER — HOSPITAL ENCOUNTER (OUTPATIENT)
Dept: INFUSION CENTER | Facility: HOSPITAL | Age: 56
Discharge: HOME | End: 2021-08-03
Payer: MEDICARE

## 2021-08-03 ENCOUNTER — APPOINTMENT (OUTPATIENT)
Dept: LAB | Facility: HOSPITAL | Age: 56
End: 2021-08-03
Payer: MEDICARE

## 2021-08-03 VITALS
RESPIRATION RATE: 18 BRPM | DIASTOLIC BLOOD PRESSURE: 91 MMHG | TEMPERATURE: 97.5 F | HEART RATE: 63 BPM | SYSTOLIC BLOOD PRESSURE: 145 MMHG

## 2021-08-03 DIAGNOSIS — E89.0 POSTOPERATIVE HYPOTHYROIDISM: ICD-10-CM

## 2021-08-03 LAB
T4 FREE SERPL-MCNC: 1.22 NG/DL (ref 0.76–1.46)
TSH SERPL DL<=0.05 MIU/L-ACNC: 8.14 UIU/ML (ref 0.45–5.33)

## 2021-08-03 PROCEDURE — 84443 ASSAY THYROID STIM HORMONE: CPT

## 2021-08-03 PROCEDURE — 84439 ASSAY OF FREE THYROXINE: CPT

## 2021-08-03 PROCEDURE — 96413 CHEMO IV INFUSION 1 HR: CPT

## 2021-08-03 PROCEDURE — 36415 COLL VENOUS BLD VENIPUNCTURE: CPT

## 2021-08-03 RX ADMIN — SODIUM CHLORIDE 20 ML/HR: 0.9 INJECTION, SOLUTION INTRAVENOUS at 09:15

## 2021-08-03 RX ADMIN — VEDOLIZUMAB 300 MG: 300 INJECTION, POWDER, LYOPHILIZED, FOR SOLUTION INTRAVENOUS at 09:29

## 2021-08-04 DIAGNOSIS — E89.0 POSTOPERATIVE HYPOTHYROIDISM: Primary | ICD-10-CM

## 2021-08-04 RX ORDER — LEVOTHYROXINE SODIUM 112 UG/1
112 TABLET ORAL DAILY
Qty: 90 TABLET | Refills: 0 | Status: SHIPPED | OUTPATIENT
Start: 2021-08-04 | End: 2021-09-23 | Stop reason: SDUPTHER

## 2021-08-04 NOTE — TELEPHONE ENCOUNTER
Patient called back confirming she received the message, she did not have any missed doses of the medication  She would like the 112 mcg [facility], 90 day supply of medication is pending to be signed  Lab work also pending to be done in 6 weeks  Thank you

## 2021-08-04 NOTE — TELEPHONE ENCOUNTER
----- Message from Odin Solis PA-C sent at 8/4/2021 12:35 PM EDT -----  Thyroid labs have improved significantly but not at goal   If taking levothyroxine 100mcg daily and properly on empty stomach with no missed doses increase to 112mcg daily  Check TSH/Free T4 in 6 weeks

## 2021-08-25 ENCOUNTER — HOSPITAL ENCOUNTER (OUTPATIENT)
Dept: MAMMOGRAPHY | Facility: HOSPITAL | Age: 56
Discharge: HOME | End: 2021-08-25
Attending: OBSTETRICS & GYNECOLOGY
Payer: MEDICARE

## 2021-08-25 VITALS — BODY MASS INDEX: 19.04 KG/M2 | HEIGHT: 60 IN | WEIGHT: 97 LBS

## 2021-08-25 DIAGNOSIS — Z12.31 ENCOUNTER FOR SCREENING MAMMOGRAM FOR MALIGNANT NEOPLASM OF BREAST: ICD-10-CM

## 2021-08-25 PROCEDURE — 77067 SCR MAMMO BI INCL CAD: CPT

## 2021-08-25 PROCEDURE — 77063 BREAST TOMOSYNTHESIS BI: CPT

## 2021-09-14 ENCOUNTER — APPOINTMENT (OUTPATIENT)
Dept: LAB | Facility: HOSPITAL | Age: 56
End: 2021-09-14
Payer: MEDICARE

## 2021-09-14 DIAGNOSIS — K50.018 CROHN'S DISEASE OF SMALL INTESTINE WITH OTHER COMPLICATION (HCC): ICD-10-CM

## 2021-09-14 PROCEDURE — 36415 COLL VENOUS BLD VENIPUNCTURE: CPT

## 2021-09-14 PROCEDURE — 80280 DRUG ASSAY VEDOLIZUMAB: CPT

## 2021-09-14 PROCEDURE — 82397 CHEMILUMINESCENT ASSAY: CPT

## 2021-09-14 RX ORDER — ACETAMINOPHEN 325 MG/1
650 TABLET ORAL ONCE
Status: DISCONTINUED | OUTPATIENT
Start: 2021-09-15 | End: 2021-09-19 | Stop reason: HOSPADM

## 2021-09-14 RX ORDER — SODIUM CHLORIDE 9 MG/ML
20 INJECTION, SOLUTION INTRAVENOUS ONCE
Status: COMPLETED | OUTPATIENT
Start: 2021-09-15 | End: 2021-09-15

## 2021-09-14 RX ORDER — DIPHENHYDRAMINE HCL 25 MG
25 TABLET ORAL ONCE
Status: DISCONTINUED | OUTPATIENT
Start: 2021-09-15 | End: 2021-09-19 | Stop reason: HOSPADM

## 2021-09-15 ENCOUNTER — HOSPITAL ENCOUNTER (OUTPATIENT)
Dept: INFUSION CENTER | Facility: HOSPITAL | Age: 56
Discharge: HOME | End: 2021-09-15
Payer: MEDICARE

## 2021-09-15 VITALS
TEMPERATURE: 97.3 F | HEART RATE: 55 BPM | DIASTOLIC BLOOD PRESSURE: 70 MMHG | SYSTOLIC BLOOD PRESSURE: 132 MMHG | RESPIRATION RATE: 18 BRPM | OXYGEN SATURATION: 100 %

## 2021-09-15 PROCEDURE — 96413 CHEMO IV INFUSION 1 HR: CPT

## 2021-09-15 RX ADMIN — VEDOLIZUMAB 300 MG: 300 INJECTION, POWDER, LYOPHILIZED, FOR SOLUTION INTRAVENOUS at 09:51

## 2021-09-15 RX ADMIN — SODIUM CHLORIDE 20 ML/HR: 0.9 INJECTION, SOLUTION INTRAVENOUS at 09:13

## 2021-09-15 NOTE — PROGRESS NOTES
Pt offers no complaints  Tolerated entyvio infusion well without incident  Discharged in stable condition and pt aware of next infusion appointment in 6 weeks  AVS provided  no weight-bearing restrictions

## 2021-09-17 ENCOUNTER — OFFICE VISIT (OUTPATIENT)
Dept: ENDOCRINOLOGY | Facility: CLINIC | Age: 56
End: 2021-09-17
Payer: MEDICARE

## 2021-09-17 VITALS
BODY MASS INDEX: 18.97 KG/M2 | HEART RATE: 74 BPM | OXYGEN SATURATION: 97 % | DIASTOLIC BLOOD PRESSURE: 74 MMHG | SYSTOLIC BLOOD PRESSURE: 122 MMHG | WEIGHT: 96.6 LBS | HEIGHT: 60 IN

## 2021-09-17 DIAGNOSIS — M81.0 AGE-RELATED OSTEOPOROSIS WITHOUT CURRENT PATHOLOGICAL FRACTURE: ICD-10-CM

## 2021-09-17 DIAGNOSIS — E89.0 POSTOPERATIVE HYPOTHYROIDISM: Primary | ICD-10-CM

## 2021-09-17 PROCEDURE — 99214 OFFICE O/P EST MOD 30 MIN: CPT | Performed by: NURSE PRACTITIONER

## 2021-09-17 RX ORDER — VALSARTAN AND HYDROCHLOROTHIAZIDE 80; 12.5 MG/1; MG/1
TABLET, FILM COATED ORAL
COMMUNITY
Start: 2021-07-22

## 2021-09-17 NOTE — ASSESSMENT & PLAN NOTE
Start Prolia injections  Reviewed mechanism of action as well as potential side effects including site irritation, bone pain, hypocalcemia, and GI upset  Repeat DEXA scan after 07/22/2023

## 2021-09-17 NOTE — PROGRESS NOTES
Established Patient Progress Note       No chief complaint on file  History of Present Illness:     Radha Estrada is a 64 y o  female with a history of thyroid CA s/p thyroidectomy and postsurgical hypothyroidism presenting to the office today to establish care  Patient had been following with [facility] sporadically starting in 2010  Last appointment was in 2014  To review:    She underwent a total thyroidectomy in August of 2007  1 8 cm L papillary carcinoma without evidence of lymph node or vascular invasion  No report of lymph node dissection       In November 2007, treated with 100 mCi I131  Uptake noted in upper and lower neck  TH level at time of scan was not performed      11/08 Thyrogen testing was negative  2/2012 LN mapping negative and TG antibody negative  2/2013 and 2/2014 TG < 0 2      For postsurgical hypothyroidism, she is currently taking 112 mcg of levothyroxine daily  She is taking this consistently and correctly  This was increased from 100 mcg daily on 08/03/2021 after an elevated TSH resulted  Component      Latest Ref Rng & Units 6/8/2021 8/3/2021   TSH 3RD GENERATON      0 450 - 5 330 uIU/mL 48 420 (H) 8 140 (H)     Component      Latest Ref Rng & Units 6/8/2021 8/3/2021   Free T4      0 76 - 1 46 ng/dL 0 68 (L) 1 22       Patient reports constipation, which is chronic due to Crohn's disease  She denies hair loss, cold intolerance, and dysphoric mood  She will be due to repeat labs next week  For osteoporosis, she is currently supplementing with Vit D 2,000 iu daily and 2 Tums daily- unsure of Ca+ concentration      First diagnosed in 2019 via DEXA scan     Osteoporosis: Calcium and Vit D     Onset 49 yo  History of fragility fractures: negative  Hardware in hip or spine/spinal surgery: + 2011 Cervical disc disease;  Laparoscopic -titanium plates plaque/screws  Severe DJD: +  History of HRT use: "for maybe 4 years"        Risk Factors:  Early menopause: Partial hysterectomy 2013; unsure of onset of menopause  Family history of osteoporosis: + mother  Rheumatoid arthritis: -  Glucocorticoid use: -  PPI use: + > 10 years  Smoking: former 7 5 pack/year  Alcohol: rarely     Dietary Calcium intake: patient has some form of dairy daily, green leafy vegetables  Calcium/Vitamin D supplementation:  Weight bearing exercises: daily      At her last appointment on 07/08/2021, we did discuss Reclast infusions versus Prolia  Today, patient has made a decision that she would like to initiate Prolia injections  Patient Active Problem List   Diagnosis    Biliary dyskinesia    Gallbladder polyp    [facility]    Encounter for care related to vascular access port    Cancer ([place] [address] )    GERD (gastroesophageal reflux disease)    Hiatal hernia    Benign neoplasm of colon    Chronic neck pain    Cobalamin deficiency    [facility]'s disease of colon [facility]    Degeneration of thoracic intervertebral disc    Diverticular disease of colon    History of colonic polyps    Internal hemorrhoids without complication    Malignant neoplasm of thyroid gland ([facility]    Symptomatic menopausal or female climacteric states    Osteoporosis    Postoperative hypothyroidism    Sleep disorder    GEETHA III (vulvar intraepithelial neoplasia III)      Past Medical History:   Diagnosis Date    Cancer ([place] [address] )     thyroid,vulvar    Chronic pain disorder     [facility]     Disease of thyroid gland     GERD (gastroesophageal reflux disease)     Hiatal hernia     Hyperlipidemia       Past Surgical History:   Procedure Laterality Date    CERVICAL FUSION      C 6-7    COLONOSCOPY  2004    2006,2012,2014,2016,2018    EGD  2012    EGD  2016    HYSTERECTOMY  2013    NECK SURGERY  2011     plates and screws in neck bewtween 6-7    HI INSJ TUNNELED CTR VAD W/SUBQ PORT AGE 5 YR/> Left 10/10/2019    Procedure: INSERTION VENOUS PORT (PORT-A-CATH);   Surgeon: Alejandrina Welch MD;  Location: [facility] MAIN OR;  Service: General    MS LAP,CHOLECYSTECTOMY/GRAPH N/A 2018    Procedure: LAPAROSCOPIC CHOLECYSTECTOMY WITH CHOLANGIOGRAM;  Surgeon: Pierre Reardon MD;  Location: [facility] MAIN OR;  Service: General    SIMPLE VULVECTOMY  2014    THYROIDECTOMY        Family History   Problem Relation Age of Onset    Crohn's disease Mother     Heart disease Father     No Known Problems Sister     No Known Problems Maternal Grandmother     No Known Problems Paternal Grandmother      Social History     Tobacco Use    Smoking status: Former Smoker     Packs/day: 0 50     Years: 15 00     Pack years: 7 50     Quit date: 2007     Years since quittin 7    Smokeless tobacco: Never Used   Substance Use Topics    Alcohol use: Yes     Comment: occ wine     Allergies   Allergen Reactions    Mercaptopurine Rash     Cancer occurrence--this medication is for Chrohn's disease    Penicillins Lightheadedness    Humira [Adalimumab] Rash    Remicade [Infliximab] Rash       Current Outpatient Medications:     ADVANCED EVENING PRIMROSE OIL PO, Take 2,000 mg by mouth daily , Disp: , Rfl:     Black Cohosh 40 MG CAPS, Take 1 capsule by mouth daily , Disp: , Rfl:     Cholecalciferol (VITAMIN D) 2000 units CAPS, Take 2,000 Units by mouth daily, Disp: , Rfl:     levothyroxine 112 mcg tablet, Take 1 tablet (112 mcg total) by mouth daily, Disp: 90 tablet, Rfl: 0    lidocaine-prilocaine (EMLA) cream, APPLY A SMALL AMOUNT OVER PORT SITE 30 60 MINUTES PRIOR TO USE, Disp: , Rfl: 1    Multiple Vitamin (MULTI VITAMIN DAILY PO), Take by mouth daily, Disp: , Rfl:     omeprazole (PriLOSEC) 20 mg delayed release capsule, 40 mg , Disp: , Rfl:     pravastatin (PRAVACHOL) 20 mg tablet, Take 20 mg by mouth daily, Disp: , Rfl:     sucralfate (CARAFATE) 1 g tablet, , Disp: , Rfl:     valsartan-hydrochlorothiazide (DIOVAN-HCT) 80-12 5 MG per tablet, , Disp: , Rfl:     vedolizumab (ENTYVIO) 300 MG SOLR, Infuse 300 mg into a venous catheter 0,2,6 weeks and then every 6 weeks, Disp: , Rfl:   No current facility-administered medications for this visit  Facility-Administered Medications Ordered in Other Visits:     acetaminophen (TYLENOL) tablet 650 mg, 650 mg, Oral, Once, Karson Shahid MD    diphenhydrAMINE (BENADRYL) tablet 25 mg, 25 mg, Oral, Once, Karson Shahid MD    Review of Systems   Constitutional: Negative for activity change, appetite change, fatigue and unexpected weight change  HENT: Negative for sore throat, trouble swallowing and voice change  Eyes: Negative for visual disturbance  Respiratory: Negative for cough, chest tightness and shortness of breath  Cardiovascular: Positive for leg swelling  Negative for chest pain and palpitations  Gastrointestinal: Positive for constipation and diarrhea  Negative for nausea and vomiting  Endocrine: Negative for cold intolerance, heat intolerance, polydipsia, polyphagia and polyuria  Genitourinary: Negative for frequency  Musculoskeletal: Positive for arthralgias and back pain  Negative for gait problem, joint swelling and myalgias  Skin: Negative for wound  Allergic/Immunologic: Positive for environmental allergies  Negative for food allergies  Neurological: Negative for dizziness, weakness, light-headedness, numbness and headaches  Hematological: Does not bruise/bleed easily  Psychiatric/Behavioral: Positive for sleep disturbance (Due to hot flashes)  Negative for decreased concentration and dysphoric mood  The patient is not nervous/anxious  Physical Exam:  Body mass index is 18 87 kg/m²  /74 (BP Location: Left arm, Cuff Size: Adult)   Pulse 74   Ht 5' (1 524 m)   Wt 43 8 kg (96 lb 9 6 oz)   SpO2 97%   BMI 18 87 kg/m²    Wt Readings from Last 3 Encounters:   09/17/21 43 8 kg (96 lb 9 6 oz)   08/25/21 44 kg (97 lb)   07/08/21 44 3 kg (97 lb 9 6 oz)       Physical Exam  Vitals reviewed     Constitutional:       General: She is not in acute distress  Appearance: She is well-developed  She is not ill-appearing  HENT:      Head: Normocephalic and atraumatic  Comments: Mask in place  Eyes:      Pupils: Pupils are equal, round, and reactive to light  Neck:      Thyroid: No thyromegaly  Cardiovascular:      Rate and Rhythm: Normal rate and regular rhythm  Pulses: Normal pulses  Heart sounds: Normal heart sounds  Pulmonary:      Effort: Pulmonary effort is normal       Breath sounds: Normal breath sounds  Abdominal:      General: Bowel sounds are normal  There is no distension  Palpations: Abdomen is soft  Tenderness: There is no abdominal tenderness  Musculoskeletal:      Cervical back: Normal range of motion and neck supple  Right lower leg: No edema  Left lower leg: No edema  Lymphadenopathy:      Cervical: No cervical adenopathy  Skin:     General: Skin is warm and dry  Capillary Refill: Capillary refill takes less than 2 seconds  Neurological:      Mental Status: She is alert and oriented to person, place, and time        Gait: Gait normal    Psychiatric:         Mood and Affect: Mood normal          Behavior: Behavior normal          Labs:       Lab Results   Component Value Date    CREATININE 0 67 12/07/2020    CREATININE 0 74 06/24/2020    CREATININE 0 74 12/10/2019    BUN 8 12/07/2020    K 4 1 12/07/2020    CL 99 12/07/2020    CO2 32 (H) 12/07/2020     eGFR   Date Value Ref Range Status   12/07/2020 99 ml/min/1 73sq m Final       Lab Results   Component Value Date    HDL 68 12/07/2020    TRIG 139 12/07/2020       Lab Results   Component Value Date    ALT 14 12/07/2020    AST 19 12/07/2020    ALKPHOS 81 12/07/2020       Lab Results   Component Value Date    FREET4 1 22 08/03/2021         Impression & Plan:    Problem List Items Addressed This Visit        Endocrine    Postoperative hypothyroidism - Primary       Patient is due to complete thyroid function tests in approximately 1 week   Will make recommendations after these labs have resulted  At this time, continue levothyroxine 112 mcg daily  Musculoskeletal and Integument    Osteoporosis       Start Prolia injections  Reviewed mechanism of action as well as potential side effects including site irritation, bone pain, hypocalcemia, and GI upset  Repeat DEXA scan after 07/22/2023  Discussed with the patient and all questioned fully answered  She will call me if any problems arise  Follow-up appointment in 6 months       Counseled patient on diagnostic results, prognosis, risk and benefit of treatment options, instruction for management, importance of treatment compliance, Risk  factor reduction and impressions      NICOLAS Chris

## 2021-09-17 NOTE — ASSESSMENT & PLAN NOTE
Patient is due to complete thyroid function tests in approximately 1 week  Will make recommendations after these labs have resulted  At this time, continue levothyroxine 112 mcg daily

## 2021-09-22 ENCOUNTER — APPOINTMENT (OUTPATIENT)
Dept: LAB | Facility: HOSPITAL | Age: 56
End: 2021-09-22
Payer: MEDICARE

## 2021-09-22 DIAGNOSIS — E89.0 POSTOPERATIVE HYPOTHYROIDISM: ICD-10-CM

## 2021-09-22 LAB
T4 FREE SERPL-MCNC: 1.29 NG/DL (ref 0.76–1.46)
TSH SERPL DL<=0.05 MIU/L-ACNC: 0.91 UIU/ML (ref 0.45–5.33)

## 2021-09-22 PROCEDURE — 84443 ASSAY THYROID STIM HORMONE: CPT

## 2021-09-22 PROCEDURE — 84439 ASSAY OF FREE THYROXINE: CPT

## 2021-09-22 PROCEDURE — 36415 COLL VENOUS BLD VENIPUNCTURE: CPT

## 2021-09-23 DIAGNOSIS — E89.0 POSTOPERATIVE HYPOTHYROIDISM: ICD-10-CM

## 2021-09-23 RX ORDER — LEVOTHYROXINE SODIUM 112 UG/1
112 TABLET ORAL DAILY
Qty: 90 TABLET | Refills: 2 | Status: SHIPPED | OUTPATIENT
Start: 2021-09-23 | End: 2021-09-24 | Stop reason: SDUPTHER

## 2021-09-23 NOTE — TELEPHONE ENCOUNTER
Called patient and made her aware  Verbally understood  Requested another prescription on Levothyroxine [facility]

## 2021-09-23 NOTE — TELEPHONE ENCOUNTER
----- Message from Grady Rodriguez PA-C sent at 9/22/2021  5:57 PM EDT -----  Thyroid labs are normal   Repeat TSH/Free T4 before next visit in March

## 2021-09-24 ENCOUNTER — TELEPHONE (OUTPATIENT)
Dept: ENDOCRINOLOGY | Facility: CLINIC | Age: 56
End: 2021-09-24

## 2021-09-24 DIAGNOSIS — E89.0 POSTOPERATIVE HYPOTHYROIDISM: ICD-10-CM

## 2021-09-24 DIAGNOSIS — G96.191 TARLOV CYSTS: Primary | ICD-10-CM

## 2021-09-24 RX ORDER — LEVOTHYROXINE SODIUM 112 UG/1
112 TABLET ORAL DAILY
Qty: 90 TABLET | Refills: 2 | Status: SHIPPED | OUTPATIENT
Start: 2021-09-24 | End: 2022-01-05 | Stop reason: SDUPTHER

## 2021-09-24 NOTE — TELEPHONE ENCOUNTER
----- Message from [address] sent at 9/23/2021  1:25 PM EDT -----  Regarding: Ortho referral  Patient had asked me for a referral to a good orthopedist for her Tarlov cysts  I spoke with Dr Cee Fay, general surgeon, and she believes that the referral should be placed to a neurosurgeon or spine specialist rather than straight orthopedist  That said, if patient would prefer to start with ortho, Dr Sita Walker does come up to [place] and I will happily give her a referral to see him  If she would prefer to see a spine/neuro specialist, I would recommend she contact Dr Gabrielle Cosme who is more familiar with the specialists in the area       Thank you, C

## 2021-09-24 NOTE — TELEPHONE ENCOUNTER
A prescription for her Levothyroxine was sent in yesterday but was sent to [facility]  Patient would like it sent to [facility] instead

## 2021-09-28 LAB — MISCELLANEOUS LAB TEST RESULT: NORMAL

## 2021-09-30 NOTE — TELEPHONE ENCOUNTER
Called and spoke with patient  She would prefer to start with ortho and would like the referral placed for her to see Dr Mick Mcmullen  No other questions at this time

## 2021-10-25 RX ORDER — SODIUM CHLORIDE 9 MG/ML
20 INJECTION, SOLUTION INTRAVENOUS ONCE
Status: COMPLETED | OUTPATIENT
Start: 2021-10-26 | End: 2021-10-26

## 2021-10-25 RX ORDER — ACETAMINOPHEN 325 MG/1
650 TABLET ORAL ONCE
Status: DISCONTINUED | OUTPATIENT
Start: 2021-10-26 | End: 2021-10-30 | Stop reason: HOSPADM

## 2021-10-25 RX ORDER — DIPHENHYDRAMINE HCL 25 MG
25 TABLET ORAL ONCE
Status: DISCONTINUED | OUTPATIENT
Start: 2021-10-26 | End: 2021-10-30 | Stop reason: HOSPADM

## 2021-10-26 ENCOUNTER — HOSPITAL ENCOUNTER (OUTPATIENT)
Dept: INFUSION CENTER | Facility: HOSPITAL | Age: 56
Discharge: HOME | End: 2021-10-26
Payer: MEDICARE

## 2021-10-26 VITALS
RESPIRATION RATE: 16 BRPM | SYSTOLIC BLOOD PRESSURE: 116 MMHG | HEART RATE: 69 BPM | TEMPERATURE: 97.4 F | DIASTOLIC BLOOD PRESSURE: 78 MMHG | OXYGEN SATURATION: 98 %

## 2021-10-26 PROCEDURE — 96413 CHEMO IV INFUSION 1 HR: CPT

## 2021-10-26 RX ADMIN — VEDOLIZUMAB 300 MG: 300 INJECTION, POWDER, LYOPHILIZED, FOR SOLUTION INTRAVENOUS at 09:35

## 2021-10-26 RX ADMIN — SODIUM CHLORIDE 20 ML/HR: 0.9 INJECTION, SOLUTION INTRAVENOUS at 09:24

## 2021-11-22 ENCOUNTER — IMMUNIZATIONS (OUTPATIENT)
Dept: FAMILY MEDICINE CLINIC | Facility: HOSPITAL | Age: 56
End: 2021-11-22

## 2021-11-22 DIAGNOSIS — Z23 ENCOUNTER FOR IMMUNIZATION: Primary | ICD-10-CM

## 2021-11-22 PROCEDURE — 91306 COVID-19 MODERNA VACC 0.25 ML BOOSTER: CPT

## 2021-11-22 PROCEDURE — 0064A COVID-19 MODERNA VACC 0.25 ML BOOSTER: CPT

## 2021-11-29 ENCOUNTER — HOSPITAL ENCOUNTER (OUTPATIENT)
Dept: INFUSION CENTER | Facility: HOSPITAL | Age: 56
Discharge: HOME | End: 2021-11-29

## 2021-11-29 RX ORDER — ACETAMINOPHEN 325 MG/1
650 TABLET ORAL ONCE
Status: DISCONTINUED | OUTPATIENT
Start: 2021-11-30 | End: 2021-12-04 | Stop reason: HOSPADM

## 2021-11-29 RX ORDER — SODIUM CHLORIDE 9 MG/ML
20 INJECTION, SOLUTION INTRAVENOUS ONCE
Status: COMPLETED | OUTPATIENT
Start: 2021-11-30 | End: 2021-11-30

## 2021-11-29 RX ORDER — DIPHENHYDRAMINE HCL 25 MG
25 TABLET ORAL ONCE
Status: DISCONTINUED | OUTPATIENT
Start: 2021-11-30 | End: 2021-12-04 | Stop reason: HOSPADM

## 2021-11-30 ENCOUNTER — HOSPITAL ENCOUNTER (OUTPATIENT)
Dept: INFUSION CENTER | Facility: HOSPITAL | Age: 56
Discharge: HOME | End: 2021-11-30
Payer: MEDICARE

## 2021-11-30 VITALS
TEMPERATURE: 98.9 F | HEART RATE: 71 BPM | SYSTOLIC BLOOD PRESSURE: 117 MMHG | DIASTOLIC BLOOD PRESSURE: 78 MMHG | OXYGEN SATURATION: 100 % | RESPIRATION RATE: 18 BRPM

## 2021-11-30 PROCEDURE — 96413 CHEMO IV INFUSION 1 HR: CPT

## 2021-11-30 RX ADMIN — SODIUM CHLORIDE 20 ML/HR: 0.9 INJECTION, SOLUTION INTRAVENOUS at 13:44

## 2021-11-30 RX ADMIN — VEDOLIZUMAB 300 MG: 300 INJECTION, POWDER, LYOPHILIZED, FOR SOLUTION INTRAVENOUS at 14:15

## 2022-01-03 RX ORDER — ACETAMINOPHEN 325 MG/1
650 TABLET ORAL ONCE
Status: DISCONTINUED | OUTPATIENT
Start: 2022-01-04 | End: 2022-01-08 | Stop reason: HOSPADM

## 2022-01-03 RX ORDER — DIPHENHYDRAMINE HCL 25 MG
25 TABLET ORAL ONCE
Status: DISCONTINUED | OUTPATIENT
Start: 2022-01-04 | End: 2022-01-08 | Stop reason: HOSPADM

## 2022-01-03 RX ORDER — SODIUM CHLORIDE 9 MG/ML
20 INJECTION, SOLUTION INTRAVENOUS ONCE
Status: COMPLETED | OUTPATIENT
Start: 2022-01-04 | End: 2022-01-04

## 2022-01-04 ENCOUNTER — HOSPITAL ENCOUNTER (OUTPATIENT)
Dept: INFUSION CENTER | Facility: HOSPITAL | Age: 57
Discharge: HOME | End: 2022-01-04
Payer: MEDICARE

## 2022-01-04 VITALS
DIASTOLIC BLOOD PRESSURE: 83 MMHG | SYSTOLIC BLOOD PRESSURE: 131 MMHG | HEART RATE: 64 BPM | RESPIRATION RATE: 18 BRPM | TEMPERATURE: 98.4 F

## 2022-01-04 PROCEDURE — 96413 CHEMO IV INFUSION 1 HR: CPT

## 2022-01-04 RX ADMIN — VEDOLIZUMAB 300 MG: 300 INJECTION, POWDER, LYOPHILIZED, FOR SOLUTION INTRAVENOUS at 10:56

## 2022-01-04 RX ADMIN — SODIUM CHLORIDE 20 ML/HR: 0.9 INJECTION, SOLUTION INTRAVENOUS at 10:37

## 2022-01-04 NOTE — PROGRESS NOTES
Patient tolerated entyvio infusion without issue  Discharged in stable condition, declined AVS but aware of next appointment

## 2022-01-05 DIAGNOSIS — E89.0 POSTOPERATIVE HYPOTHYROIDISM: ICD-10-CM

## 2022-01-05 RX ORDER — LEVOTHYROXINE SODIUM 112 UG/1
112 TABLET ORAL DAILY
Qty: 90 TABLET | Refills: 0 | Status: SHIPPED | OUTPATIENT
Start: 2022-01-05 | End: 2022-02-17

## 2022-01-05 NOTE — TELEPHONE ENCOUNTER
Patient called requesting a refill of her Synthroid  Patient is up to date on lab work  Her last office visit was on 7/8/21  Her next office visit is on 3/24/2022    Confirmed with patient she is still on the 112 mcg daily  Patient's insurance changed so she needs a 90 day supply sent to a new pharmacy, pharmacy updated and medication pending for 90 day supply

## 2022-02-07 RX ORDER — DIPHENHYDRAMINE HCL 25 MG
25 TABLET ORAL ONCE
Status: DISCONTINUED | OUTPATIENT
Start: 2022-02-08 | End: 2022-02-12 | Stop reason: HOSPADM

## 2022-02-07 RX ORDER — ACETAMINOPHEN 325 MG/1
650 TABLET ORAL ONCE
Status: DISCONTINUED | OUTPATIENT
Start: 2022-02-08 | End: 2022-02-12 | Stop reason: HOSPADM

## 2022-02-08 ENCOUNTER — HOSPITAL ENCOUNTER (OUTPATIENT)
Dept: INFUSION CENTER | Facility: HOSPITAL | Age: 57
Discharge: HOME | End: 2022-02-08
Payer: MEDICARE

## 2022-02-08 VITALS
SYSTOLIC BLOOD PRESSURE: 129 MMHG | DIASTOLIC BLOOD PRESSURE: 63 MMHG | OXYGEN SATURATION: 100 % | TEMPERATURE: 97 F | HEART RATE: 61 BPM | RESPIRATION RATE: 16 BRPM

## 2022-02-08 PROCEDURE — 96413 CHEMO IV INFUSION 1 HR: CPT

## 2022-02-08 RX ORDER — SODIUM CHLORIDE 9 MG/ML
20 INJECTION, SOLUTION INTRAVENOUS ONCE
Status: COMPLETED | OUTPATIENT
Start: 2022-02-08 | End: 2022-02-08

## 2022-02-08 RX ADMIN — VEDOLIZUMAB 300 MG: 300 INJECTION, POWDER, LYOPHILIZED, FOR SOLUTION INTRAVENOUS at 14:47

## 2022-02-08 RX ADMIN — SODIUM CHLORIDE 20 ML/HR: 0.9 INJECTION, SOLUTION INTRAVENOUS at 14:21

## 2022-02-08 NOTE — PROGRESS NOTES
Patient tolerated entyvio infusion without issue  Appointment made for next infusion in 5 weeks  Patient discharged in stable condition, declined AVS but aware of next appointment

## 2022-02-16 ENCOUNTER — APPOINTMENT (OUTPATIENT)
Dept: LAB | Facility: CLINIC | Age: 57
End: 2022-02-16
Payer: MEDICARE

## 2022-02-16 ENCOUNTER — TELEPHONE (OUTPATIENT)
Dept: ENDOCRINOLOGY | Facility: CLINIC | Age: 57
End: 2022-02-16

## 2022-02-16 DIAGNOSIS — C73 MALIGNANT NEOPLASM OF THYROID GLAND (HCC): ICD-10-CM

## 2022-02-16 DIAGNOSIS — E89.0 POSTOPERATIVE HYPOTHYROIDISM: Primary | ICD-10-CM

## 2022-02-16 LAB
T4 FREE SERPL-MCNC: 1.11 NG/DL (ref 0.76–1.46)
TSH SERPL DL<=0.05 MIU/L-ACNC: 3.83 UIU/ML (ref 0.36–3.74)

## 2022-02-16 PROCEDURE — 84443 ASSAY THYROID STIM HORMONE: CPT

## 2022-02-16 PROCEDURE — 84439 ASSAY OF FREE THYROXINE: CPT

## 2022-02-16 PROCEDURE — 36415 COLL VENOUS BLD VENIPUNCTURE: CPT

## 2022-02-16 NOTE — TELEPHONE ENCOUNTER
Patient would like to have her thyroid checked  She does state she is having some mood swings  Orders placed for TSH and T4

## 2022-02-17 DIAGNOSIS — E89.0 POSTOPERATIVE HYPOTHYROIDISM: Primary | ICD-10-CM

## 2022-02-17 RX ORDER — LEVOTHYROXINE SODIUM 112 UG/1
TABLET ORAL
Qty: 90 TABLET | Refills: 0 | Status: SHIPPED | OUTPATIENT
Start: 2022-02-17 | End: 2022-03-30

## 2022-03-14 RX ORDER — DIPHENHYDRAMINE HCL 25 MG
25 TABLET ORAL ONCE
Status: DISCONTINUED | OUTPATIENT
Start: 2022-03-15 | End: 2022-03-19 | Stop reason: HOSPADM

## 2022-03-14 RX ORDER — SODIUM CHLORIDE 9 MG/ML
20 INJECTION, SOLUTION INTRAVENOUS ONCE
Status: COMPLETED | OUTPATIENT
Start: 2022-03-15 | End: 2022-03-15

## 2022-03-14 RX ORDER — ACETAMINOPHEN 325 MG/1
650 TABLET ORAL ONCE
Status: DISCONTINUED | OUTPATIENT
Start: 2022-03-15 | End: 2022-03-19 | Stop reason: HOSPADM

## 2022-03-15 ENCOUNTER — HOSPITAL ENCOUNTER (OUTPATIENT)
Dept: INFUSION CENTER | Facility: HOSPITAL | Age: 57
Discharge: HOME | End: 2022-03-15
Payer: MEDICARE

## 2022-03-15 VITALS
OXYGEN SATURATION: 100 % | TEMPERATURE: 97 F | RESPIRATION RATE: 18 BRPM | SYSTOLIC BLOOD PRESSURE: 129 MMHG | HEART RATE: 67 BPM | DIASTOLIC BLOOD PRESSURE: 68 MMHG

## 2022-03-15 PROCEDURE — 96413 CHEMO IV INFUSION 1 HR: CPT

## 2022-03-15 RX ADMIN — SODIUM CHLORIDE 20 ML/HR: 9 INJECTION, SOLUTION INTRAVENOUS at 10:30

## 2022-03-15 RX ADMIN — VEDOLIZUMAB 300 MG: 300 INJECTION, POWDER, LYOPHILIZED, FOR SOLUTION INTRAVENOUS at 10:44

## 2022-03-24 ENCOUNTER — OFFICE VISIT (OUTPATIENT)
Dept: ENDOCRINOLOGY | Facility: CLINIC | Age: 57
End: 2022-03-24
Payer: MEDICARE

## 2022-03-24 ENCOUNTER — APPOINTMENT (OUTPATIENT)
Dept: LAB | Facility: CLINIC | Age: 57
End: 2022-03-24
Payer: MEDICARE

## 2022-03-24 VITALS
DIASTOLIC BLOOD PRESSURE: 72 MMHG | WEIGHT: 97 LBS | BODY MASS INDEX: 19.04 KG/M2 | HEIGHT: 60 IN | SYSTOLIC BLOOD PRESSURE: 136 MMHG

## 2022-03-24 DIAGNOSIS — E89.0 POSTOPERATIVE HYPOTHYROIDISM: ICD-10-CM

## 2022-03-24 DIAGNOSIS — C73 MALIGNANT NEOPLASM OF THYROID GLAND (HCC): ICD-10-CM

## 2022-03-24 DIAGNOSIS — C73 THYROID CA (HCC): ICD-10-CM

## 2022-03-24 DIAGNOSIS — M81.0 AGE-RELATED OSTEOPOROSIS WITHOUT CURRENT PATHOLOGICAL FRACTURE: Primary | ICD-10-CM

## 2022-03-24 DIAGNOSIS — E55.9 VITAMIN D DEFICIENCY: ICD-10-CM

## 2022-03-24 DIAGNOSIS — K50.119 CROHN'S DISEASE OF LARGE INTESTINE WITH COMPLICATION (HCC): ICD-10-CM

## 2022-03-24 DIAGNOSIS — E03.9 HYPOTHYROIDISM, ADULT: ICD-10-CM

## 2022-03-24 PROBLEM — R63.0 ANOREXIA SYMPTOM: Status: ACTIVE | Noted: 2022-02-14

## 2022-03-24 PROBLEM — M76.62 ACHILLES TENDINITIS OF LEFT LOWER EXTREMITY: Status: ACTIVE | Noted: 2022-02-14

## 2022-03-24 LAB
25(OH)D3 SERPL-MCNC: 91.8 NG/ML (ref 30–100)
ALBUMIN SERPL BCP-MCNC: 3.9 G/DL (ref 3.5–5)
ALP SERPL-CCNC: 100 U/L (ref 46–116)
ALT SERPL W P-5'-P-CCNC: 24 U/L (ref 12–78)
ANION GAP SERPL CALCULATED.3IONS-SCNC: 3 MMOL/L (ref 4–13)
AST SERPL W P-5'-P-CCNC: 17 U/L (ref 5–45)
BILIRUB SERPL-MCNC: 0.53 MG/DL (ref 0.2–1)
BUN SERPL-MCNC: 8 MG/DL (ref 5–25)
CALCIUM SERPL-MCNC: 9.5 MG/DL (ref 8.3–10.1)
CHLORIDE SERPL-SCNC: 101 MMOL/L (ref 100–108)
CHOLEST SERPL-MCNC: 159 MG/DL
CO2 SERPL-SCNC: 32 MMOL/L (ref 21–32)
CREAT SERPL-MCNC: 0.68 MG/DL (ref 0.6–1.3)
ERYTHROCYTE [DISTWIDTH] IN BLOOD BY AUTOMATED COUNT: 13.1 % (ref 11.6–15.1)
GFR SERPL CREATININE-BSD FRML MDRD: 98 ML/MIN/1.73SQ M
GLUCOSE SERPL-MCNC: 99 MG/DL (ref 65–140)
HCT VFR BLD AUTO: 41 % (ref 34.8–46.1)
HDLC SERPL-MCNC: 60 MG/DL
HGB BLD-MCNC: 13.4 G/DL (ref 11.5–15.4)
LDLC SERPL CALC-MCNC: 66 MG/DL (ref 0–100)
MCH RBC QN AUTO: 30.1 PG (ref 26.8–34.3)
MCHC RBC AUTO-ENTMCNC: 32.7 G/DL (ref 31.4–37.4)
MCV RBC AUTO: 92 FL (ref 82–98)
NONHDLC SERPL-MCNC: 99 MG/DL
PLATELET # BLD AUTO: 306 THOUSANDS/UL (ref 149–390)
PMV BLD AUTO: 11.7 FL (ref 8.9–12.7)
POTASSIUM SERPL-SCNC: 3.5 MMOL/L (ref 3.5–5.3)
PROT SERPL-MCNC: 7.7 G/DL (ref 6.4–8.2)
RBC # BLD AUTO: 4.45 MILLION/UL (ref 3.81–5.12)
SODIUM SERPL-SCNC: 136 MMOL/L (ref 136–145)
T4 FREE SERPL-MCNC: 1.55 NG/DL (ref 0.76–1.46)
TRIGL SERPL-MCNC: 164 MG/DL
TSH SERPL DL<=0.05 MIU/L-ACNC: 0.35 UIU/ML (ref 0.36–3.74)
WBC # BLD AUTO: 10.23 THOUSAND/UL (ref 4.31–10.16)

## 2022-03-24 PROCEDURE — 80053 COMPREHEN METABOLIC PANEL: CPT

## 2022-03-24 PROCEDURE — 99214 OFFICE O/P EST MOD 30 MIN: CPT | Performed by: NURSE PRACTITIONER

## 2022-03-24 PROCEDURE — 36415 COLL VENOUS BLD VENIPUNCTURE: CPT

## 2022-03-24 PROCEDURE — 86800 THYROGLOBULIN ANTIBODY: CPT

## 2022-03-24 PROCEDURE — 84439 ASSAY OF FREE THYROXINE: CPT

## 2022-03-24 PROCEDURE — 84443 ASSAY THYROID STIM HORMONE: CPT

## 2022-03-24 PROCEDURE — 80061 LIPID PANEL: CPT

## 2022-03-24 PROCEDURE — 84432 ASSAY OF THYROGLOBULIN: CPT

## 2022-03-24 PROCEDURE — 85027 COMPLETE CBC AUTOMATED: CPT

## 2022-03-24 PROCEDURE — 82306 VITAMIN D 25 HYDROXY: CPT

## 2022-03-24 RX ORDER — OMEPRAZOLE 40 MG/1
CAPSULE, DELAYED RELEASE ORAL
COMMUNITY
Start: 2022-03-18

## 2022-03-24 RX ORDER — SODIUM CHLORIDE 9 MG/ML
20 INJECTION, SOLUTION INTRAVENOUS ONCE
Status: CANCELLED | OUTPATIENT
Start: 2022-05-02

## 2022-03-24 RX ORDER — ZOLEDRONIC ACID 5 MG/100ML
5 INJECTION, SOLUTION INTRAVENOUS ONCE
Status: CANCELLED | OUTPATIENT
Start: 2022-05-02

## 2022-03-24 NOTE — ASSESSMENT & PLAN NOTE
Counseled on basic pathophysiology of osteoporosis  Discussed treatment options which exclude oral bisphosphonates due to patient's crohn's diease  Reviewed mechanism of action, potential side effects, methods of delivery, and duration of use of both Reclast infusions and Prolia injections  At this time, patient would prefer to start once yearly Reclast infusions  Next Dexa scan will be due in July of 2023  Continue Vit D and Ca+ supplements  Reviewed Ca+ containing foods  Continue with regular weight bearing exercise  Reviewed fall precautions

## 2022-03-24 NOTE — LETTER
To whom it may concern:    Per patient's medical team including Dr Antelmo Lewis, Dr Leda Jacobs, and myself, it is ok to access the port of patient Deya Iraheta for infusions  Thank you  Sincerely,  Lars Grapes Parker Meigs                    Please Fax to [phone] [facility] [address]

## 2022-03-24 NOTE — PROGRESS NOTES
Established Patient Progress Note       Chief Complaint   Patient presents with    Hypothyroidism    Osteoporosis        History of Present Illness:     Chato Boothe is a 64 y o  female with a history of thyroid CA s/p thyroidectomy, postsurgical hypothyroidism, and osteoporosis presenting to the office today for follow-up  To review:     She underwent a total thyroidectomy in August of 2007  1 8 cm L papillary carcinoma without evidence of lymph node or vascular invasion  No report of lymph node dissection       In November 2007, treated with 100 mCi I131  Uptake noted in upper and lower neck  TG level at time of scan was not performed      11/08 Thyrogen testing was negative  2/2012 LN mapping negative and TG antibody negative  2/2013 and 2/2014 TG < 0 2    At patient's initial appointment on 09/17/2021, levothyroxine 112 mcg was continued as patient is thyroid function was stable on this dose  On 02/16/2022, TSH was mildly elevated at 3 83 with a normal free T4 1 11 ng/dL  Levothyroxine was conservatively increased to 112 mcg Monday through Saturday with 1 and half tablets to be taken on Sunday  Patient is due for a repeat of her thyroid function  For osteoporosis, it was recommended that patient start Reclast infusions or Prolia due to multiple risk factors of osteoporosis as well as a T-score of -3 1 in the lumbar spine  She is currently supplementing calcium and vitamin-D  Today, we reviewed her DEXA scan and discussed treatment options in detail  Narrative & Impression   CENTRAL  DXA SCAN     CLINICAL HISTORY:  49-year-old postmenopausal  female with a history of Crohn's disease  Gastroesophageal reflux with Prilosec use  OTHER RISK FACTORS:  None      PHARMACOLOGIC THERAPY FOR OSTEOPOROSIS:  None      TECHNIQUE: Bone densitometry was performed using a GE Lunar Prodigy  bone densitometer    Regions of interest appear properly placed       COMPARISON: Several prior studies, most recent February 22, 2019            RESULTS:   LUMBAR SPINE L2-L4 (L1 vertebra excluded from analysis due to local structural abnormalities or artifact): BMD  0 832  gm/cm2   T-score -3 1    These values are artifactually elevated due to degenerative sclerosis and osteophytosis      LEFT TOTAL HIP:   BMD: 0 794  gm/cm2   T-score: -1 7      LEFT FEMORAL NECK:   BMD: 0 770  gm/cm2   T score: -1 9      RIGHT TOTAL HIP:   BMD:  0 792  gm/cm2   T-score: -1 7     RIGHT FEMORAL NECK:   BMD:  0 795  gm/cm2    T score: -1 7     LEFT FOREARM :  BMD 0 615 gm/sq-cm,  T-score is  -1 3           IMPRESSION:  1   OSTEOPOROSIS  [Based on the lumbar spine]     2  When compared to the prior examination, there has been a 4  % DECREASE in the total bone mineral density of the lumbar spine and a  5 % DECREASE in the mean total bone mineral density of the bilateral hips      Comparison cannot be made to the left forearm, as this is the patient's initial, screening, baseline examination of the forearm         3  The 10 year risk of hip fracture is 1 with the 10 year risk of major osteoporotic fracture being 7 as calculated by the [facility]/WHO fracture risk assessment tool (FRAX)     4  The current NOF guidelines recommend treating patients with a T-score of -2 5 or less in the lumbar spine or hips, or in post-menopausal women and men over the age of 48 with low bone mass (osteopenia) and a FRAX 10 year risk score of >3% for hip   fracture and/or >20% for major osteoporotic fracture      5  The NOF recommends follow-up DXA in 1-2 years after initiating therapy for osteoporosis and every 2 years thereafter  More frequent evaluation is appropriate for patients with conditions associated with rapid bone loss, such as glucocorticoid   therapy  The interval between DXA screenings may be longer for individuals without major risk factors and initial T-score in the normal or upper low bone mass range  Patient Active Problem List   Diagnosis    Biliary dyskinesia    Gallbladder polyp    [facility]    Encounter for care related to vascular access port    [facility]    GERD (gastroesophageal reflux disease)    Hiatal hernia    Benign neoplasm of colon    Chronic neck pain    Cobalamin deficiency    Crohn's disease of colon ([facility]    Degeneration of thoracic intervertebral disc    Diverticular disease of colon    History of colonic polyps    Internal hemorrhoids without complication    Malignant neoplasm of thyroid gland ([facility]    Symptomatic menopausal or female climacteric states    Osteoporosis    Postoperative hypothyroidism    Sleep disorder    GEETHA III (vulvar intraepithelial neoplasia III)    Achilles tendinitis of left lower extremity    Anorexia symptom      Past Medical History:   Diagnosis Date    Cancer ([facility] )     thyroid,vulvar    Chronic pain disorder     [facility]'s disease ([facility]     Disease of thyroid gland     GERD (gastroesophageal reflux disease)     Hiatal hernia     Hyperlipidemia       Past Surgical History:   Procedure Laterality Date    CERVICAL FUSION      C 6-7    COLONOSCOPY  2004    2006,2012,2014,2016,2018    EGD  2012    EGD  2016    HYSTERECTOMY  2013    NECK SURGERY  2011     plates and screws in neck bewtween 6-7    HI INSJ TUNNELED CTR VAD W/SUBQ PORT AGE 5 YR/> Left 10/10/2019    Procedure: INSERTION VENOUS PORT (PORT-A-CATH);   Surgeon: Eliazar Angel MD;  Location: [facility] MAIN OR;  Service: [place] HI LAP,CHOLECYSTECTOMY/GRAPH N/A 9/27/2018    Procedure: LAPAROSCOPIC CHOLECYSTECTOMY WITH CHOLANGIOGRAM;  Surgeon: Eliazar Angel MD;  Location: [facility] MAIN OR;  Service: General    SIMPLE VULVECTOMY  12/24/2014    THYROIDECTOMY        Family History   Problem Relation Age of Onset    Crohn's disease Mother     Heart disease Father     No Known Problems Sister     No Known Problems Maternal Grandmother     No Known Problems Paternal Grandmother      Social History     Tobacco Use    Smoking status: Former Smoker     Packs/day: 0 50     Years: 15 00     Pack years: 7 50     Quit date: 1/1/2007     Years since quitting: 15 2    Smokeless tobacco: Never Used   Substance Use Topics    Alcohol use: Yes     Comment: occ wine     Allergies   Allergen Reactions    Mercaptopurine Rash     Cancer occurrence--this medication is for Chrohn's disease    Penicillins Lightheadedness    Humira [Adalimumab] Rash    Remicade [Infliximab] Rash       Current Outpatient Medications:     ADVANCED EVENING PRIMROSE OIL PO, Take 2,000 mg by mouth daily , Disp: , Rfl:     Black Cohosh 40 MG CAPS, Take 1 capsule by mouth daily , Disp: , Rfl:     Cholecalciferol (VITAMIN D) 2000 units CAPS, Take 2,000 Units by mouth daily, Disp: , Rfl:     levothyroxine 112 mcg tablet, Take 1 tablet (112 mcg) Monday-Saturday  Take 1 5 tablets (168 mcg) on Sunday  , Disp: 90 tablet, Rfl: 0    lidocaine-prilocaine (EMLA) cream, APPLY A SMALL AMOUNT OVER PORT SITE 30 60 MINUTES PRIOR TO USE, Disp: , Rfl: 1    Multiple Vitamin (MULTI VITAMIN DAILY PO), Take by mouth daily, Disp: , Rfl:     omeprazole (PriLOSEC) 20 mg delayed release capsule, 40 mg , Disp: , Rfl:     omeprazole (PriLOSEC) 40 MG capsule, , Disp: , Rfl:     pravastatin (PRAVACHOL) 20 mg tablet, Take 20 mg by mouth daily, Disp: , Rfl:     sucralfate (CARAFATE) 1 g tablet, , Disp: , Rfl:     valsartan-hydrochlorothiazide (DIOVAN-HCT) 80-12 5 MG per tablet, , Disp: , Rfl:     vedolizumab (ENTYVIO) 300 MG SOLR, Infuse 300 mg into a venous catheter 0,2,6 weeks and then every 6 weeks, Disp: , Rfl:     Review of Systems   Constitutional: Positive for fatigue and unexpected weight change (weight loss)  Negative for activity change and appetite change  HENT: Negative for dental problem, sore throat, trouble swallowing and voice change  Eyes: Negative for visual disturbance     Respiratory: Negative for cough, chest tightness and shortness of breath  Cardiovascular: Negative for chest pain, palpitations and leg swelling  Gastrointestinal: Negative for constipation, diarrhea, nausea and vomiting  Endocrine: Negative for cold intolerance and heat intolerance  Genitourinary: Negative for frequency  Musculoskeletal: Positive for arthralgias  Negative for back pain, gait problem, joint swelling and myalgias  Skin: Negative for wound  Allergic/Immunologic: Positive for environmental allergies  Negative for food allergies  Neurological: Negative for dizziness, tremors, weakness, light-headedness, numbness and headaches  Hematological: Does not bruise/bleed easily  Psychiatric/Behavioral: Positive for sleep disturbance  Negative for decreased concentration and dysphoric mood  The patient is not nervous/anxious  Physical Exam:  Body mass index is 18 94 kg/m²  /72   Ht 5' (1 524 m)   Wt 44 kg (97 lb)   BMI 18 94 kg/m²    Wt Readings from Last 3 Encounters:   03/24/22 44 kg (97 lb)   09/17/21 43 8 kg (96 lb 9 6 oz)   08/25/21 44 kg (97 lb)       Physical Exam  Vitals reviewed  Constitutional:       General: She is not in acute distress  Appearance: She is well-developed  She is not ill-appearing  HENT:      Head: Normocephalic and atraumatic  Eyes:      Pupils: Pupils are equal, round, and reactive to light  Neck:      Thyroid: No thyromegaly  Cardiovascular:      Rate and Rhythm: Normal rate and regular rhythm  Pulses: Normal pulses  Heart sounds: Normal heart sounds  Pulmonary:      Effort: Pulmonary effort is normal       Breath sounds: Normal breath sounds  Abdominal:      General: Bowel sounds are normal  There is no distension  Palpations: Abdomen is soft  Tenderness: There is no abdominal tenderness  Musculoskeletal:      Cervical back: Normal range of motion and neck supple  Right lower leg: No edema  Left lower leg: No edema  Lymphadenopathy:      Cervical: No cervical adenopathy  Skin:     General: Skin is warm and dry  Capillary Refill: Capillary refill takes less than 2 seconds  Neurological:      Mental Status: She is alert and oriented to person, place, and time  Gait: Gait normal    Psychiatric:         Mood and Affect: Mood normal          Behavior: Behavior normal          Labs:       Lab Results   Component Value Date    CREATININE 0 67 12/07/2020    CREATININE 0 74 06/24/2020    CREATININE 0 74 12/10/2019    BUN 8 12/07/2020    K 4 1 12/07/2020    CL 99 12/07/2020    CO2 32 (H) 12/07/2020     eGFR   Date Value Ref Range Status   12/07/2020 99 ml/min/1 73sq m Final       Lab Results   Component Value Date    HDL 68 12/07/2020    TRIG 139 12/07/2020       Lab Results   Component Value Date    ALT 14 12/07/2020    AST 19 12/07/2020    ALKPHOS 81 12/07/2020       Lab Results   Component Value Date    FREET4 1 11 02/16/2022         Impression & Plan:    Problem List Items Addressed This Visit        Endocrine    Malignant neoplasm of thyroid gland ([facility] )     Check thyroglobulin panel  Postoperative hypothyroidism     Patient will complete ordered TFT  Will make any adjustments to medication regimen if needed  Musculoskeletal and Integument    Osteoporosis - Primary     Counseled on basic pathophysiology of osteoporosis  Discussed treatment options which exclude oral bisphosphonates due to patient's crohn's diease  Reviewed mechanism of action, potential side effects, methods of delivery, and duration of use of both Reclast infusions and Prolia injections  At this time, patient would prefer to start once yearly Reclast infusions  Next Dexa scan will be due in July of 2023  Continue Vit D and Ca+ supplements  Reviewed Ca+ containing foods  Continue with regular weight bearing exercise  Reviewed fall precautions              Other Visit Diagnoses     Thyroid ca [facility])        Relevant Orders Thyroglobulin w/ab Lab Collect          Orders Placed This Encounter   Procedures    Thyroglobulin w/ab Lab Collect     Standing Status:   Future     Number of Occurrences:   1     Standing Expiration Date:   3/24/2023       Patient Instructions   Dr Nathalie Valencia-     Discussed with the patient and all questioned fully answered  She will call me if any problems arise  Follow-up appointment in 6 months       Counseled patient on diagnostic results, prognosis, risk and benefit of treatment options, instruction for management, importance of treatment compliance, Risk  factor reduction and impressions      NICOLAS Quigley Junior

## 2022-03-25 LAB
THYROGLOB AB SERPL-ACNC: <1 IU/ML (ref 0–0.9)
THYROGLOB SERPL-MCNC: <0.1 NG/ML (ref 1.5–38.5)

## 2022-03-29 ENCOUNTER — TELEPHONE (OUTPATIENT)
Dept: ENDOCRINOLOGY | Facility: CLINIC | Age: 57
End: 2022-03-29

## 2022-03-30 DIAGNOSIS — E89.0 POSTOPERATIVE HYPOTHYROIDISM: ICD-10-CM

## 2022-03-30 DIAGNOSIS — E89.0 POSTOPERATIVE HYPOTHYROIDISM: Primary | ICD-10-CM

## 2022-03-30 RX ORDER — LEVOTHYROXINE SODIUM 0.03 MG/1
TABLET ORAL
Qty: 6 TABLET | Refills: 1 | Status: SHIPPED | OUTPATIENT
Start: 2022-03-30 | End: 2022-03-30 | Stop reason: SDUPTHER

## 2022-03-30 RX ORDER — LEVOTHYROXINE SODIUM 0.03 MG/1
TABLET ORAL
Qty: 16 TABLET | Refills: 1 | Status: SHIPPED | OUTPATIENT
Start: 2022-03-30 | End: 2022-05-25 | Stop reason: CLARIF

## 2022-03-30 RX ORDER — LEVOTHYROXINE SODIUM 112 UG/1
TABLET ORAL
Qty: 90 TABLET | Refills: 0
Start: 2022-03-30 | End: 2022-03-30 | Stop reason: SDUPTHER

## 2022-03-30 RX ORDER — LEVOTHYROXINE SODIUM 112 UG/1
TABLET ORAL
Qty: 90 TABLET | Refills: 0 | Status: SHIPPED | OUTPATIENT
Start: 2022-03-30 | End: 2022-05-25 | Stop reason: CLARIF

## 2022-04-15 ENCOUNTER — TELEPHONE (OUTPATIENT)
Dept: ENDOCRINOLOGY | Facility: CLINIC | Age: 57
End: 2022-04-15

## 2022-04-15 NOTE — TELEPHONE ENCOUNTER
----- Message from Consuelo Garcia sent at 4/14/2022  3:40 PM EDT -----  Regarding: Yeni Jaime  Would like to know what's going on with the reclast? No one from infusion called to set this up  I called earlier this month and talked to someone there in your endocrinologist place and told them I don't think it's the infusion responsibility to call my insurance and set it up  I think it's your responsibility  That's how it was for my entyvio  Let me know  Also I started taking the thyroid med 112+25my On sunday  I will go for my next thyroid level check and of may   Thank you sincerely Danelle Naik

## 2022-04-15 NOTE — TELEPHONE ENCOUNTER
Called patient - She has her infusion scheduled at the [address] [facility] infusion center on 5/2/22 at 1:00 pm  I was able to get her Reclast approved through [id number] her [address] through 12/31/22 please see media for approval      Patient would also like to know if there is any prep she should do before her infusion, please advise

## 2022-04-15 NOTE — TELEPHONE ENCOUNTER
Excellent  The only thing I recommend is to make sure she is well hydrated  She can eat normally  Drink 2 glasses of water approximately 1 hour prior to receiving the infusion

## 2022-04-18 RX ORDER — SODIUM CHLORIDE 9 MG/ML
20 INJECTION, SOLUTION INTRAVENOUS ONCE
Status: COMPLETED | OUTPATIENT
Start: 2022-04-19 | End: 2022-04-19

## 2022-04-18 RX ORDER — DIPHENHYDRAMINE HCL 25 MG
25 TABLET ORAL ONCE
Status: DISCONTINUED | OUTPATIENT
Start: 2022-04-19 | End: 2022-04-23 | Stop reason: HOSPADM

## 2022-04-18 RX ORDER — ACETAMINOPHEN 325 MG/1
650 TABLET ORAL ONCE
Status: DISCONTINUED | OUTPATIENT
Start: 2022-04-19 | End: 2022-04-23 | Stop reason: HOSPADM

## 2022-04-19 ENCOUNTER — HOSPITAL ENCOUNTER (OUTPATIENT)
Dept: INFUSION CENTER | Facility: HOSPITAL | Age: 57
Discharge: HOME | End: 2022-04-19
Payer: MEDICARE

## 2022-04-19 VITALS
HEART RATE: 62 BPM | SYSTOLIC BLOOD PRESSURE: 127 MMHG | RESPIRATION RATE: 18 BRPM | OXYGEN SATURATION: 100 % | DIASTOLIC BLOOD PRESSURE: 81 MMHG | TEMPERATURE: 97.7 F

## 2022-04-19 PROCEDURE — 96413 CHEMO IV INFUSION 1 HR: CPT

## 2022-04-19 RX ADMIN — VEDOLIZUMAB 300 MG: 300 INJECTION, POWDER, LYOPHILIZED, FOR SOLUTION INTRAVENOUS at 10:36

## 2022-04-19 RX ADMIN — SODIUM CHLORIDE 20 ML/HR: 0.9 INJECTION, SOLUTION INTRAVENOUS at 10:30

## 2022-04-19 NOTE — PROGRESS NOTES
Entyvio given without incident  No S/S of adverse reaction  Patient offers no complaint son D/C  AVS given

## 2022-05-02 ENCOUNTER — HOSPITAL ENCOUNTER (OUTPATIENT)
Dept: INFUSION CENTER | Facility: HOSPITAL | Age: 57
Discharge: HOME | End: 2022-05-02
Attending: INTERNAL MEDICINE
Payer: MEDICARE

## 2022-05-02 VITALS
DIASTOLIC BLOOD PRESSURE: 78 MMHG | RESPIRATION RATE: 18 BRPM | OXYGEN SATURATION: 97 % | SYSTOLIC BLOOD PRESSURE: 133 MMHG | HEART RATE: 62 BPM | TEMPERATURE: 97.2 F

## 2022-05-02 DIAGNOSIS — M81.0 AGE-RELATED OSTEOPOROSIS WITHOUT CURRENT PATHOLOGICAL FRACTURE: Primary | ICD-10-CM

## 2022-05-02 PROCEDURE — 96365 THER/PROPH/DIAG IV INF INIT: CPT

## 2022-05-02 RX ORDER — ZOLEDRONIC ACID 5 MG/100ML
5 INJECTION, SOLUTION INTRAVENOUS ONCE
OUTPATIENT
Start: 2023-05-02

## 2022-05-02 RX ORDER — SODIUM CHLORIDE 9 MG/ML
20 INJECTION, SOLUTION INTRAVENOUS ONCE
Status: COMPLETED | OUTPATIENT
Start: 2022-05-02 | End: 2022-05-02

## 2022-05-02 RX ORDER — SODIUM CHLORIDE 9 MG/ML
20 INJECTION, SOLUTION INTRAVENOUS ONCE
OUTPATIENT
Start: 2023-05-02

## 2022-05-02 RX ORDER — ZOLEDRONIC ACID 5 MG/100ML
5 INJECTION, SOLUTION INTRAVENOUS ONCE
Status: COMPLETED | OUTPATIENT
Start: 2022-05-02 | End: 2022-05-02

## 2022-05-02 RX ADMIN — SODIUM CHLORIDE 20 ML/HR: 0.9 INJECTION, SOLUTION INTRAVENOUS at 13:10

## 2022-05-02 RX ADMIN — ZOLEDRONIC ACID 5 MG: 5 INJECTION, SOLUTION INTRAVENOUS at 13:14

## 2022-05-02 NOTE — PLAN OF CARE
Problem: INFECTION - ADULT  Goal: Absence or prevention of progression during hospitalization  Description: INTERVENTIONS:  - Assess and monitor for signs and symptoms of infection  - Monitor lab/diagnostic results  - Monitor all insertion sites, i e  indwelling lines, tubes, and drains  - Monitor endotracheal if appropriate and nasal secretions for changes in amount and color  - [place] appropriate cooling/warming therapies per order  - Administer medications as ordered  - Instruct and encourage patient and family to use good hand hygiene technique  - Identify and instruct in appropriate isolation precautions for identified infection/condition  Outcome: Progressing     Problem: Knowledge Deficit  Goal: Patient/family/caregiver demonstrates understanding of disease process, treatment plan, medications, and discharge instructions  Description: Complete learning assessment and assess knowledge base    Interventions:  - Provide teaching at level of understanding  - Provide teaching via preferred learning methods  Outcome: Progressing

## 2022-05-17 ENCOUNTER — APPOINTMENT (OUTPATIENT)
Dept: LAB | Facility: CLINIC | Age: 57
End: 2022-05-17
Payer: MEDICARE

## 2022-05-17 DIAGNOSIS — Q44.6 CYSTIC DISEASE OF LIVER: ICD-10-CM

## 2022-05-17 DIAGNOSIS — R10.9 ABDOMINAL PAIN, UNSPECIFIED ABDOMINAL LOCATION: ICD-10-CM

## 2022-05-17 DIAGNOSIS — K21.9 GASTRO-ESOPHAGEAL REFLUX DISEASE WITHOUT ESOPHAGITIS: ICD-10-CM

## 2022-05-17 DIAGNOSIS — R19.7 DIARRHEA, UNSPECIFIED TYPE: ICD-10-CM

## 2022-05-17 DIAGNOSIS — K50.018 CROHN'S DISEASE OF SMALL INTESTINE WITH OTHER COMPLICATION (HCC): ICD-10-CM

## 2022-05-17 DIAGNOSIS — R11.0 NAUSEA: ICD-10-CM

## 2022-05-17 PROCEDURE — 82397 CHEMILUMINESCENT ASSAY: CPT

## 2022-05-17 PROCEDURE — 36415 COLL VENOUS BLD VENIPUNCTURE: CPT

## 2022-05-17 PROCEDURE — 80280 DRUG ASSAY VEDOLIZUMAB: CPT

## 2022-05-23 RX ORDER — SODIUM CHLORIDE 9 MG/ML
20 INJECTION, SOLUTION INTRAVENOUS ONCE
Status: COMPLETED | OUTPATIENT
Start: 2022-05-24 | End: 2022-05-24

## 2022-05-23 RX ORDER — DIPHENHYDRAMINE HCL 25 MG
25 TABLET ORAL ONCE
Status: DISCONTINUED | OUTPATIENT
Start: 2022-05-24 | End: 2022-05-28 | Stop reason: HOSPADM

## 2022-05-23 RX ORDER — ACETAMINOPHEN 325 MG/1
650 TABLET ORAL ONCE
Status: DISCONTINUED | OUTPATIENT
Start: 2022-05-24 | End: 2022-05-28 | Stop reason: HOSPADM

## 2022-05-24 ENCOUNTER — HOSPITAL ENCOUNTER (OUTPATIENT)
Dept: INFUSION CENTER | Facility: HOSPITAL | Age: 57
Discharge: HOME | End: 2022-05-24
Payer: MEDICARE

## 2022-05-24 VITALS
DIASTOLIC BLOOD PRESSURE: 75 MMHG | RESPIRATION RATE: 18 BRPM | TEMPERATURE: 96.9 F | SYSTOLIC BLOOD PRESSURE: 115 MMHG | HEART RATE: 57 BPM

## 2022-05-24 DIAGNOSIS — E89.0 POSTOPERATIVE HYPOTHYROIDISM: ICD-10-CM

## 2022-05-24 LAB
T4 FREE SERPL-MCNC: 1.43 NG/DL (ref 0.76–1.46)
TSH SERPL DL<=0.05 MIU/L-ACNC: 0.2 UIU/ML (ref 0.45–4.5)

## 2022-05-24 PROCEDURE — 96413 CHEMO IV INFUSION 1 HR: CPT

## 2022-05-24 PROCEDURE — 84443 ASSAY THYROID STIM HORMONE: CPT

## 2022-05-24 PROCEDURE — 84439 ASSAY OF FREE THYROXINE: CPT

## 2022-05-24 RX ADMIN — VEDOLIZUMAB 300 MG: 300 INJECTION, POWDER, LYOPHILIZED, FOR SOLUTION INTRAVENOUS at 10:57

## 2022-05-24 RX ADMIN — SODIUM CHLORIDE 20 ML/HR: 0.9 INJECTION, SOLUTION INTRAVENOUS at 10:21

## 2022-05-25 DIAGNOSIS — E89.0 POSTOPERATIVE HYPOTHYROIDISM: Primary | ICD-10-CM

## 2022-05-25 RX ORDER — LEVOTHYROXINE SODIUM 112 MCG
112 TABLET ORAL DAILY
Qty: 90 TABLET | Refills: 0 | Status: SHIPPED | OUTPATIENT
Start: 2022-05-25 | End: 2022-07-18 | Stop reason: SDUPTHER

## 2022-05-26 ENCOUNTER — TELEPHONE (OUTPATIENT)
Dept: PULMONOLOGY | Facility: CLINIC | Age: 57
End: 2022-05-26

## 2022-05-26 NOTE — TELEPHONE ENCOUNTER
Called [facility] and confirmed levothyroxine alternative generic 
Cynthia Trent from [employer] order calling to confirm medication  Synthroid 112mcg is ordered for her is it okay to use the generic form?   Phone Number: [phone]  REFERENCE # [id number]
No

## 2022-06-07 ENCOUNTER — APPOINTMENT (OUTPATIENT)
Dept: LAB | Facility: CLINIC | Age: 57
End: 2022-06-07
Payer: MEDICARE

## 2022-06-07 ENCOUNTER — APPOINTMENT (OUTPATIENT)
Dept: RADIOLOGY | Facility: CLINIC | Age: 57
End: 2022-06-07
Payer: MEDICARE

## 2022-06-07 DIAGNOSIS — R52 PAIN: ICD-10-CM

## 2022-06-07 DIAGNOSIS — R60.9 SWELLING: ICD-10-CM

## 2022-06-07 DIAGNOSIS — M25.50 ARTHRALGIA, UNSPECIFIED JOINT: ICD-10-CM

## 2022-06-07 LAB
BASOPHILS # BLD AUTO: 0.06 THOUSANDS/ΜL (ref 0–0.1)
BASOPHILS NFR BLD AUTO: 1 % (ref 0–1)
C3 SERPL-MCNC: 145 MG/DL (ref 90–180)
C4 SERPL-MCNC: 23 MG/DL (ref 10–40)
CRP SERPL QL: <3 MG/L
EOSINOPHIL # BLD AUTO: 0.17 THOUSAND/ΜL (ref 0–0.61)
EOSINOPHIL NFR BLD AUTO: 2 % (ref 0–6)
ERYTHROCYTE [DISTWIDTH] IN BLOOD BY AUTOMATED COUNT: 13.2 % (ref 11.6–15.1)
ERYTHROCYTE [SEDIMENTATION RATE] IN BLOOD: 44 MM/HOUR (ref 0–29)
HCT VFR BLD AUTO: 43.4 % (ref 34.8–46.1)
HGB BLD-MCNC: 14 G/DL (ref 11.5–15.4)
IMM GRANULOCYTES # BLD AUTO: 0.02 THOUSAND/UL (ref 0–0.2)
IMM GRANULOCYTES NFR BLD AUTO: 0 % (ref 0–2)
LYMPHOCYTES # BLD AUTO: 2.58 THOUSANDS/ΜL (ref 0.6–4.47)
LYMPHOCYTES NFR BLD AUTO: 27 % (ref 14–44)
MCH RBC QN AUTO: 30.6 PG (ref 26.8–34.3)
MCHC RBC AUTO-ENTMCNC: 32.3 G/DL (ref 31.4–37.4)
MCV RBC AUTO: 95 FL (ref 82–98)
MONOCYTES # BLD AUTO: 0.56 THOUSAND/ΜL (ref 0.17–1.22)
MONOCYTES NFR BLD AUTO: 6 % (ref 4–12)
NEUTROPHILS # BLD AUTO: 6.21 THOUSANDS/ΜL (ref 1.85–7.62)
NEUTS SEG NFR BLD AUTO: 64 % (ref 43–75)
NRBC BLD AUTO-RTO: 0 /100 WBCS
PLATELET # BLD AUTO: 307 THOUSANDS/UL (ref 149–390)
PMV BLD AUTO: 12.6 FL (ref 8.9–12.7)
RBC # BLD AUTO: 4.58 MILLION/UL (ref 3.81–5.12)
WBC # BLD AUTO: 9.6 THOUSAND/UL (ref 4.31–10.16)

## 2022-06-07 PROCEDURE — 86225 DNA ANTIBODY NATIVE: CPT

## 2022-06-07 PROCEDURE — 86235 NUCLEAR ANTIGEN ANTIBODY: CPT

## 2022-06-07 PROCEDURE — 86162 COMPLEMENT TOTAL (CH50): CPT

## 2022-06-07 PROCEDURE — 36415 COLL VENOUS BLD VENIPUNCTURE: CPT

## 2022-06-07 PROCEDURE — 73080 X-RAY EXAM OF ELBOW: CPT

## 2022-06-07 PROCEDURE — 86038 ANTINUCLEAR ANTIBODIES: CPT

## 2022-06-07 PROCEDURE — 86160 COMPLEMENT ANTIGEN: CPT

## 2022-06-07 PROCEDURE — 86140 C-REACTIVE PROTEIN: CPT

## 2022-06-07 PROCEDURE — 85025 COMPLETE CBC W/AUTO DIFF WBC: CPT

## 2022-06-07 PROCEDURE — 86430 RHEUMATOID FACTOR TEST QUAL: CPT

## 2022-06-07 PROCEDURE — 85652 RBC SED RATE AUTOMATED: CPT

## 2022-06-08 LAB
CH50 SERPL-ACNC: >60 U/ML
DSDNA AB SER-ACNC: <1 IU/ML (ref 0–9)
ENA SS-A AB SER-ACNC: <0.2 AI (ref 0–0.9)
ENA SS-B AB SER-ACNC: <0.2 AI (ref 0–0.9)
RHEUMATOID FACT SER QL LA: NEGATIVE
RYE IGE QN: NEGATIVE

## 2022-06-17 RX ORDER — DIPHENHYDRAMINE HCL 25 MG
25 TABLET ORAL ONCE
Status: COMPLETED | OUTPATIENT
Start: 2022-06-20 | End: 2022-06-20

## 2022-06-17 RX ORDER — ACETAMINOPHEN 325 MG/1
650 TABLET ORAL ONCE
Status: COMPLETED | OUTPATIENT
Start: 2022-06-20 | End: 2022-06-20

## 2022-06-17 RX ORDER — SODIUM CHLORIDE 9 MG/ML
20 INJECTION, SOLUTION INTRAVENOUS ONCE
Status: COMPLETED | OUTPATIENT
Start: 2022-06-20 | End: 2022-06-20

## 2022-06-20 ENCOUNTER — HOSPITAL ENCOUNTER (OUTPATIENT)
Dept: INFUSION CENTER | Facility: HOSPITAL | Age: 57
Discharge: HOME | End: 2022-06-20
Payer: MEDICARE

## 2022-06-20 VITALS
DIASTOLIC BLOOD PRESSURE: 74 MMHG | TEMPERATURE: 97 F | RESPIRATION RATE: 18 BRPM | OXYGEN SATURATION: 100 % | SYSTOLIC BLOOD PRESSURE: 151 MMHG | HEART RATE: 60 BPM

## 2022-06-20 PROCEDURE — 96365 THER/PROPH/DIAG IV INF INIT: CPT

## 2022-06-20 RX ADMIN — SODIUM CHLORIDE 20 ML/HR: 0.9 INJECTION, SOLUTION INTRAVENOUS at 11:24

## 2022-06-20 RX ADMIN — DIPHENHYDRAMINE HCL 25 MG: 25 TABLET ORAL at 11:24

## 2022-06-20 RX ADMIN — USTEKINUMAB 260 MG: 130 SOLUTION INTRAVENOUS at 11:59

## 2022-06-20 RX ADMIN — ACETAMINOPHEN 650 MG: 325 TABLET ORAL at 11:24

## 2022-06-20 NOTE — PROGRESS NOTES
Patient tolerated stelara infusion without issue  Discharged in stable condition, declined AVS but aware of appointment in 6 weeks for port flush

## 2022-07-13 ENCOUNTER — APPOINTMENT (OUTPATIENT)
Dept: LAB | Facility: CLINIC | Age: 57
End: 2022-07-13
Payer: MEDICARE

## 2022-07-13 DIAGNOSIS — E89.0 POSTOPERATIVE HYPOTHYROIDISM: ICD-10-CM

## 2022-07-13 LAB
T4 FREE SERPL-MCNC: 1.37 NG/DL (ref 0.76–1.46)
TSH SERPL DL<=0.05 MIU/L-ACNC: 0.66 UIU/ML (ref 0.45–4.5)

## 2022-07-13 PROCEDURE — 84443 ASSAY THYROID STIM HORMONE: CPT

## 2022-07-13 PROCEDURE — 84439 ASSAY OF FREE THYROXINE: CPT

## 2022-07-13 PROCEDURE — 36415 COLL VENOUS BLD VENIPUNCTURE: CPT

## 2022-07-14 DIAGNOSIS — E89.0 POSTOPERATIVE HYPOTHYROIDISM: Primary | ICD-10-CM

## 2022-07-18 DIAGNOSIS — E89.0 POSTOPERATIVE HYPOTHYROIDISM: ICD-10-CM

## 2022-07-18 RX ORDER — LEVOTHYROXINE SODIUM 112 MCG
112 TABLET ORAL DAILY
Qty: 90 TABLET | Refills: 0 | Status: SHIPPED | OUTPATIENT
Start: 2022-07-18 | End: 2022-09-30 | Stop reason: SDUPTHER

## 2022-07-21 ENCOUNTER — APPOINTMENT (OUTPATIENT)
Dept: LAB | Facility: CLINIC | Age: 57
End: 2022-07-21
Payer: MEDICARE

## 2022-07-21 DIAGNOSIS — K50.018 CROHN'S DISEASE OF SMALL INTESTINE WITH OTHER COMPLICATION (HCC): ICD-10-CM

## 2022-07-21 LAB
HAV AB SER QL IA: NORMAL
HAV IGM SER QL: NORMAL
HBV CORE IGM SER QL: NORMAL
HBV SURFACE AB SER-ACNC: 22.97 MIU/ML
HBV SURFACE AG SER QL: NORMAL
HCV AB SER QL: NORMAL

## 2022-07-21 PROCEDURE — 36415 COLL VENOUS BLD VENIPUNCTURE: CPT

## 2022-07-21 PROCEDURE — 86706 HEP B SURFACE ANTIBODY: CPT

## 2022-07-21 PROCEDURE — 80074 ACUTE HEPATITIS PANEL: CPT

## 2022-07-21 PROCEDURE — 86480 TB TEST CELL IMMUN MEASURE: CPT

## 2022-07-21 PROCEDURE — 86708 HEPATITIS A ANTIBODY: CPT

## 2022-07-25 LAB
GAMMA INTERFERON BACKGROUND BLD IA-ACNC: 0.04 IU/ML
M TB IFN-G BLD-IMP: NEGATIVE
M TB IFN-G CD4+ BCKGRND COR BLD-ACNC: 0 IU/ML
M TB IFN-G CD4+ BCKGRND COR BLD-ACNC: 0.01 IU/ML
MITOGEN IGNF BCKGRD COR BLD-ACNC: 4.85 IU/ML

## 2022-08-01 ENCOUNTER — HOSPITAL ENCOUNTER (OUTPATIENT)
Dept: INFUSION CENTER | Facility: HOSPITAL | Age: 57
Discharge: HOME | End: 2022-08-01
Payer: MEDICARE

## 2022-08-01 VITALS — TEMPERATURE: 97.8 F

## 2022-08-01 PROCEDURE — 96523 IRRIG DRUG DELIVERY DEVICE: CPT

## 2022-08-01 NOTE — PROGRESS NOTES
Patient tolerated routine port flush without issue  Port flushed easily with good blood return noted  Appointment made for next flush in 6 weeks   Patient discharged in stable condition, declined AVS

## 2022-08-22 ENCOUNTER — APPOINTMENT (OUTPATIENT)
Dept: LAB | Facility: HOSPITAL | Age: 57
End: 2022-08-22
Attending: INTERNAL MEDICINE
Payer: MEDICARE

## 2022-08-22 DIAGNOSIS — E55.9 VITAMIN D DEFICIENCY: ICD-10-CM

## 2022-08-22 DIAGNOSIS — E11.9 TYPE 2 DIABETES MELLITUS WITHOUT COMPLICATION, WITHOUT LONG-TERM CURRENT USE OF INSULIN (HCC): ICD-10-CM

## 2022-08-22 DIAGNOSIS — R53.0 NEOPLASTIC MALIGNANT RELATED FATIGUE: ICD-10-CM

## 2022-08-22 DIAGNOSIS — E78.5 HYPERLIPIDEMIA, UNSPECIFIED HYPERLIPIDEMIA TYPE: ICD-10-CM

## 2022-08-22 DIAGNOSIS — Z12.31 ENCOUNTER FOR SCREENING MAMMOGRAM FOR MALIGNANT NEOPLASM OF BREAST: ICD-10-CM

## 2022-08-22 LAB
25(OH)D3 SERPL-MCNC: 62.6 NG/ML (ref 30–100)
ALBUMIN SERPL BCP-MCNC: 4.1 G/DL (ref 3.5–5)
ALP SERPL-CCNC: 80 U/L (ref 34–104)
ALT SERPL W P-5'-P-CCNC: 14 U/L (ref 7–52)
ANION GAP SERPL CALCULATED.3IONS-SCNC: 9 MMOL/L (ref 4–13)
AST SERPL W P-5'-P-CCNC: 18 U/L (ref 13–39)
BILIRUB SERPL-MCNC: 0.29 MG/DL (ref 0.2–1)
BUN SERPL-MCNC: 11 MG/DL (ref 5–25)
CALCIUM SERPL-MCNC: 9.3 MG/DL (ref 8.4–10.2)
CHLORIDE SERPL-SCNC: 99 MMOL/L (ref 96–108)
CHOLEST SERPL-MCNC: 158 MG/DL
CO2 SERPL-SCNC: 29 MMOL/L (ref 21–32)
CREAT SERPL-MCNC: 0.7 MG/DL (ref 0.6–1.3)
ERYTHROCYTE [DISTWIDTH] IN BLOOD BY AUTOMATED COUNT: 13.1 % (ref 11.6–15.1)
EST. AVERAGE GLUCOSE BLD GHB EST-MCNC: 117 MG/DL
GFR SERPL CREATININE-BSD FRML MDRD: 96 ML/MIN/1.73SQ M
GLUCOSE SERPL-MCNC: 94 MG/DL (ref 65–140)
HBA1C MFR BLD: 5.7 %
HCT VFR BLD AUTO: 39.8 % (ref 34.8–46.1)
HDLC SERPL-MCNC: 50 MG/DL
HGB BLD-MCNC: 12.9 G/DL (ref 11.5–15.4)
LDLC SERPL CALC-MCNC: 63 MG/DL (ref 0–100)
MCH RBC QN AUTO: 30.7 PG (ref 26.8–34.3)
MCHC RBC AUTO-ENTMCNC: 32.4 G/DL (ref 31.4–37.4)
MCV RBC AUTO: 95 FL (ref 82–98)
NONHDLC SERPL-MCNC: 108 MG/DL
PLATELET # BLD AUTO: 302 THOUSANDS/UL (ref 149–390)
PMV BLD AUTO: 11.4 FL (ref 8.9–12.7)
POTASSIUM SERPL-SCNC: 3.5 MMOL/L (ref 3.5–5.3)
PROT SERPL-MCNC: 7 G/DL (ref 6.4–8.4)
RBC # BLD AUTO: 4.2 MILLION/UL (ref 3.81–5.12)
SODIUM SERPL-SCNC: 137 MMOL/L (ref 135–147)
TRIGL SERPL-MCNC: 223 MG/DL
WBC # BLD AUTO: 9.71 THOUSAND/UL (ref 4.31–10.16)

## 2022-08-22 PROCEDURE — 85027 COMPLETE CBC AUTOMATED: CPT

## 2022-08-22 PROCEDURE — 36415 COLL VENOUS BLD VENIPUNCTURE: CPT

## 2022-08-22 PROCEDURE — 83036 HEMOGLOBIN GLYCOSYLATED A1C: CPT

## 2022-08-22 PROCEDURE — 80061 LIPID PANEL: CPT

## 2022-08-22 PROCEDURE — 82306 VITAMIN D 25 HYDROXY: CPT

## 2022-08-22 PROCEDURE — 84443 ASSAY THYROID STIM HORMONE: CPT

## 2022-08-22 PROCEDURE — 80053 COMPREHEN METABOLIC PANEL: CPT

## 2022-08-26 ENCOUNTER — HOSPITAL ENCOUNTER (OUTPATIENT)
Dept: MAMMOGRAPHY | Facility: HOSPITAL | Age: 57
End: 2022-08-26
Attending: INTERNAL MEDICINE
Payer: MEDICARE

## 2022-08-26 DIAGNOSIS — Z12.31 ENCOUNTER FOR SCREENING MAMMOGRAM FOR MALIGNANT NEOPLASM OF BREAST: ICD-10-CM

## 2022-08-26 PROCEDURE — 77063 BREAST TOMOSYNTHESIS BI: CPT

## 2022-08-26 PROCEDURE — 77067 SCR MAMMO BI INCL CAD: CPT

## 2022-08-29 LAB — MISCELLANEOUS LAB TEST RESULT: NORMAL

## 2022-09-12 ENCOUNTER — HOSPITAL ENCOUNTER (OUTPATIENT)
Dept: INFUSION CENTER | Facility: HOSPITAL | Age: 57
Discharge: HOME | End: 2022-09-12
Payer: MEDICARE

## 2022-09-12 DIAGNOSIS — E89.0 POSTOPERATIVE HYPOTHYROIDISM: ICD-10-CM

## 2022-09-12 LAB — TSH SERPL DL<=0.05 MIU/L-ACNC: 0.5 UIU/ML (ref 0.45–4.5)

## 2022-09-12 PROCEDURE — 84443 ASSAY THYROID STIM HORMONE: CPT

## 2022-09-12 NOTE — PROGRESS NOTES
Pt arrived to dept today for routine port flush  Did request that she have her tsh drawn for her pcp  Same performed  Great blood return noted  Discharged ambulatory with appt for 6 wks   Defers avs

## 2022-09-23 DIAGNOSIS — M81.0 AGE-RELATED OSTEOPOROSIS WITHOUT CURRENT PATHOLOGICAL FRACTURE: Primary | ICD-10-CM

## 2022-09-30 ENCOUNTER — OFFICE VISIT (OUTPATIENT)
Dept: ENDOCRINOLOGY | Facility: CLINIC | Age: 57
End: 2022-09-30
Payer: MEDICARE

## 2022-09-30 VITALS
BODY MASS INDEX: 18.31 KG/M2 | WEIGHT: 97 LBS | OXYGEN SATURATION: 99 % | HEIGHT: 61 IN | DIASTOLIC BLOOD PRESSURE: 60 MMHG | HEART RATE: 62 BPM | SYSTOLIC BLOOD PRESSURE: 120 MMHG

## 2022-09-30 DIAGNOSIS — E89.0 POSTOPERATIVE HYPOTHYROIDISM: ICD-10-CM

## 2022-09-30 DIAGNOSIS — R73.03 PREDIABETES: ICD-10-CM

## 2022-09-30 DIAGNOSIS — M81.0 AGE-RELATED OSTEOPOROSIS WITHOUT CURRENT PATHOLOGICAL FRACTURE: ICD-10-CM

## 2022-09-30 DIAGNOSIS — C73 MALIGNANT NEOPLASM OF THYROID GLAND (HCC): Primary | ICD-10-CM

## 2022-09-30 PROCEDURE — 99214 OFFICE O/P EST MOD 30 MIN: CPT | Performed by: NURSE PRACTITIONER

## 2022-09-30 RX ORDER — LEVOTHYROXINE SODIUM 112 MCG
112 TABLET ORAL DAILY
Qty: 90 TABLET | Refills: 1 | Status: SHIPPED | OUTPATIENT
Start: 2022-09-30

## 2022-09-30 RX ORDER — USTEKINUMAB 45 MG/.5ML
INJECTION, SOLUTION SUBCUTANEOUS
COMMUNITY

## 2022-09-30 NOTE — PROGRESS NOTES
Established Patient Progress Note       Chief Complaint   Patient presents with    Osteoporosis    Hypothyroidism        History of Present Illness:     Madiha Main is a 62 y o  female with a history of thyroid CA s/p thyroidectomy, postsurgical hypothyroidism, and osteoporosis presenting to the office today for follow-up  Past medical history significant for Crohn's disease for which she is currently receiving Stelara injections  She was recently transitioned to still are from [facility]      To review:     She underwent a total thyroidectomy in August of 2007  1 8 cm L papillary carcinoma without evidence of lymph node or vascular invasion  No report of lymph node dissection  Thyroglobulin undetectable as of 03/24/2022  For postoperative hypothyroidism, she is taking 112 mcg of brand Synthroid daily  TSH from 09/12/2022 stable  For osteoporosis, she is receiving yearly Reclast infusions  Her 1st was received on 05/02/2022  She does report increased arthralgias for approximately 1-2 days after the infusion but otherwise tolerated well  Next will be due in May of 2023  Next DEXA scan due in July of 2023  Denies recent falls or fractures  She is supplementing her calcium and vitamin-D  She is engaging in weight-bearing activity    Patient Active Problem List   Diagnosis    Biliary dyskinesia    Gallbladder polyp    Crohn's disease ([address] )    Encounter for care related to vascular access port    Cancer ([address]    GERD (gastroesophageal reflux disease)    Hiatal hernia    Benign neoplasm of colon    Chronic neck pain    Cobalamin deficiency    Crohn's disease of colon ([address] )    Degeneration of thoracic intervertebral disc    Diverticular disease of colon    History of colonic polyps    Internal hemorrhoids without complication    Malignant neoplasm of thyroid gland ([address] )    Symptomatic menopausal or female climacteric states    Osteoporosis    Postoperative hypothyroidism    Sleep disorder    GEETHA III (vulvar intraepithelial neoplasia III)    Achilles tendinitis of left lower extremity    Anorexia symptom      Past Medical History:   Diagnosis Date    Cancer ([facility] )     thyroid,vulvar    Chronic pain disorder     [facility]hn's disease ([facility]     Disease of thyroid gland     GERD (gastroesophageal reflux disease)     Hiatal hernia     Hyperlipidemia       Past Surgical History:   Procedure Laterality Date    CERVICAL FUSION      C 6-7    COLONOSCOPY  2004    2006,2012,2014,2016,2018    EGD  2012    EGD  2016    HYSTERECTOMY  2013    NECK SURGERY  2011     plates and screws in neck bewtween 6-7    [place] INSJ TUNNELED CTR VAD W/SUBQ PORT AGE 5 YR/> Left 10/10/2019    Procedure: INSERTION VENOUS PORT (PORT-A-CATH);   Surgeon: Hannah Tuttle MD;  Location: [address] [place] [address] MAIN OR;  Service: General    ID LAP,CHOLECYSTECTOMY/GRAPH N/A 9/27/2018    Procedure: LAPAROSCOPIC CHOLECYSTECTOMY WITH CHOLANGIOGRAM;  Surgeon: Hannah Tuttle MD;  Location: [address] [place] [address] MAIN OR;  Service: General    SIMPLE VULVECTOMY  12/24/2014    THYROIDECTOMY        Family History   Problem Relation Age of Onset    Crohn's disease Mother     Heart disease Father     No Known Problems Sister     No Known Problems Maternal Grandmother     No Known Problems Paternal Grandmother      Social History     Tobacco Use    Smoking status: Former Smoker     Packs/day: 0 50     Years: 15 00     Pack years: 7 50     Quit date: 1/1/2007     Years since quitting: 15 7    Smokeless tobacco: Never Used   Substance Use Topics    Alcohol use: Yes     Comment: occ wine     Allergies   Allergen Reactions    Mercaptopurine Rash     Cancer occurrence--this medication is for Chrohn's disease    Penicillins Lightheadedness    Humira [Adalimumab] Rash    Remicade [Infliximab] Rash       Current Outpatient Medications:     ADVANCED EVENING PRIMROSE OIL PO, Take 2,000 mg by mouth daily , Disp: , Rfl:     Black Cohosh 40 MG CAPS, Take 1 capsule by mouth daily , Disp: , Rfl:     lidocaine-prilocaine (EMLA) cream, APPLY A SMALL AMOUNT OVER PORT SITE 30 60 MINUTES PRIOR TO USE, Disp: , Rfl: 1    Multiple Vitamin (MULTI VITAMIN DAILY PO), Take by mouth daily, Disp: , Rfl:     omeprazole (PriLOSEC) 20 mg delayed release capsule, 40 mg , Disp: , Rfl:     omeprazole (PriLOSEC) 40 MG capsule, , Disp: , Rfl:     pravastatin (PRAVACHOL) 20 mg tablet, Take 20 mg by mouth daily, Disp: , Rfl:     sucralfate (CARAFATE) 1 g tablet, , Disp: , Rfl:     Synthroid 112 MCG tablet, Take 1 tablet (112 mcg total) by mouth daily, Disp: 90 tablet, Rfl: 1    ustekinumab (Stelara) 45 mg/0 5 mL SOSY subcutaneous injection, , Disp: , Rfl:     valsartan-hydrochlorothiazide (DIOVAN-HCT) 80-12 5 MG per tablet, , Disp: , Rfl:     Review of Systems   Constitutional: Positive for fatigue  Negative for activity change, appetite change and unexpected weight change  HENT: Negative for dental problem, sore throat, trouble swallowing and voice change  Eyes: Negative for visual disturbance  Respiratory: Negative for cough, chest tightness and shortness of breath  Cardiovascular: Negative for chest pain, palpitations and leg swelling  Gastrointestinal: Positive for diarrhea  Negative for constipation, nausea and vomiting  Endocrine: Positive for cold intolerance and heat intolerance  Genitourinary: Negative for frequency  Musculoskeletal: Positive for arthralgias  Negative for back pain, joint swelling and myalgias  Skin: Negative for wound  Allergic/Immunologic: Positive for environmental allergies  Negative for food allergies  Neurological: Negative for dizziness, weakness, light-headedness, numbness and headaches  Hematological: Does not bruise/bleed easily  Psychiatric/Behavioral: Negative for decreased concentration, dysphoric mood and sleep disturbance  The patient is not nervous/anxious  Physical Exam:  Body mass index is 18 33 kg/m²    /60 Pulse 62   Ht 5' 1" (1 549 m)   Wt 44 kg (97 lb)   SpO2 99%   BMI 18 33 kg/m²    Wt Readings from Last 3 Encounters:   09/30/22 44 kg (97 lb)   03/24/22 44 kg (97 lb)   09/17/21 43 8 kg (96 lb 9 6 oz)       Physical Exam  Vitals reviewed  Constitutional:       General: She is not in acute distress  Appearance: She is well-developed  She is not ill-appearing  HENT:      Head: Normocephalic and atraumatic  Eyes:      Pupils: Pupils are equal, round, and reactive to light  Neck:      Thyroid: No thyromegaly  Cardiovascular:      Rate and Rhythm: Normal rate and regular rhythm  Pulses: Normal pulses  Heart sounds: Normal heart sounds  Pulmonary:      Effort: Pulmonary effort is normal       Breath sounds: Normal breath sounds  Abdominal:      General: Bowel sounds are normal  There is no distension  Palpations: Abdomen is soft  Tenderness: There is no abdominal tenderness  Musculoskeletal:      Cervical back: Normal range of motion and neck supple  Right lower leg: No edema  Left lower leg: No edema  Lymphadenopathy:      Cervical: No cervical adenopathy  Skin:     General: Skin is warm and dry  Capillary Refill: Capillary refill takes less than 2 seconds  Neurological:      Mental Status: She is alert and oriented to person, place, and time        Gait: Gait normal    Psychiatric:         Mood and Affect: Mood normal          Behavior: Behavior normal          Labs:       Lab Results   Component Value Date    CREATININE 0 70 08/22/2022    CREATININE 0 68 03/24/2022    CREATININE 0 67 12/07/2020    BUN 11 08/22/2022    K 3 5 08/22/2022    CL 99 08/22/2022    CO2 29 08/22/2022     eGFR   Date Value Ref Range Status   08/22/2022 96 ml/min/1 73sq m Final       Lab Results   Component Value Date    HDL 50 08/22/2022    TRIG 223 (H) 08/22/2022       Lab Results   Component Value Date    ALT 14 08/22/2022    AST 18 08/22/2022    ALKPHOS 80 08/22/2022       Lab Results   Component Value Date    FREET4 1 37 07/13/2022         Impression & Plan:    Problem List Items Addressed This Visit        Endocrine    Malignant neoplasm of thyroid gland [facility]) - Primary     Repeat thyroglobulin panel prior to next appointment in March  Relevant Medications    Synthroid 112 MCG tablet    Other Relevant Orders    Thyroglobulin w/ab Lab Collect    Postoperative hypothyroidism     Thyroid function is stable  Continue 112 mcg of brand Synthroid daily  Repeat thyroid function tests prior to next appointment in 6 months  Patient knows to notify me should she start to experience any symptoms of hyperthyroidism or hypothyroidism  Relevant Medications    Synthroid 112 MCG tablet    Other Relevant Orders    TSH, 3rd generation with Free T4 reflex       Musculoskeletal and Integument    Osteoporosis     Patient is due for her next Reclast infusion in May  We will set this up for her at her next appointment with me in March  Check CMP prior to that appointment  Relevant Orders    Comprehensive metabolic panel Lab Collect      Other Visit Diagnoses     Prediabetes        Relevant Orders    Hemoglobin A1C          Orders Placed This Encounter   Procedures    TSH, 3rd generation with Free T4 reflex     Standing Status:   Future     Standing Expiration Date:   9/30/2023    Thyroglobulin w/ab Lab Collect     Standing Status:   Future     Standing Expiration Date:   9/30/2023    Comprehensive metabolic panel Lab Collect     This is a patient instruction: Patient fasting for 8 hours or longer recommended  Standing Status:   Future     Standing Expiration Date:   9/30/2023    Hemoglobin A1C     Standing Status:   Future     Standing Expiration Date:   9/30/2023       There are no Patient Instructions on file for this visit  Discussed with the patient and all questioned fully answered  She will call me if any problems arise  Follow-up appointment in 6 months  Counseled patient on diagnostic results, prognosis, risk and benefit of treatment options, instruction for management, importance of treatment compliance, Risk  factor reduction and impressions      NICOLAS Lopez

## 2022-09-30 NOTE — ASSESSMENT & PLAN NOTE
Thyroid function is stable  Continue 112 mcg of brand Synthroid daily  Repeat thyroid function tests prior to next appointment in 6 months  Patient knows to notify me should she start to experience any symptoms of hyperthyroidism or hypothyroidism

## 2022-09-30 NOTE — ASSESSMENT & PLAN NOTE
Patient is due for her next Reclast infusion in May  We will set this up for her at her next appointment with me in March  Check CMP prior to that appointment

## 2022-10-04 ENCOUNTER — APPOINTMENT (OUTPATIENT)
Dept: LAB | Facility: CLINIC | Age: 57
End: 2022-10-04
Payer: MEDICARE

## 2022-10-04 DIAGNOSIS — K50.018 CROHN'S DISEASE OF SMALL INTESTINE WITH OTHER COMPLICATION (HCC): ICD-10-CM

## 2022-10-04 DIAGNOSIS — R10.817 GENERALIZED ABDOMINAL TENDERNESS, REBOUND TENDERNESS PRESENCE NOT SPECIFIED: ICD-10-CM

## 2022-10-04 DIAGNOSIS — R19.5 OTHER FECAL ABNORMALITIES: ICD-10-CM

## 2022-10-04 DIAGNOSIS — R19.7 DIARRHEA, UNSPECIFIED TYPE: ICD-10-CM

## 2022-10-04 PROCEDURE — 82397 CHEMILUMINESCENT ASSAY: CPT

## 2022-10-04 PROCEDURE — 80299 QUANTITATIVE ASSAY DRUG: CPT

## 2022-10-15 LAB
USTEKINUMAB AB SERPL IA-MCNC: <40 NG/ML
USTEKINUMAB SERPL IA-MCNC: 2.6 UG/ML

## 2022-10-24 ENCOUNTER — HOSPITAL ENCOUNTER (OUTPATIENT)
Dept: INFUSION CENTER | Facility: HOSPITAL | Age: 57
Discharge: HOME | End: 2022-10-24

## 2022-10-24 VITALS — TEMPERATURE: 97.4 F

## 2022-10-24 NOTE — PROGRESS NOTES
Port flushed freely  Good blood return noted  Port deaccessed without issue  Patient tolerated well  AVS declined  Patient is aware of next appointment  Patient ambulated off unit without incident  All personal belongings taken with patient

## 2022-12-05 ENCOUNTER — HOSPITAL ENCOUNTER (OUTPATIENT)
Dept: INFUSION CENTER | Facility: HOSPITAL | Age: 57
Discharge: HOME | End: 2022-12-05

## 2022-12-05 VITALS — TEMPERATURE: 97.4 F

## 2023-01-16 ENCOUNTER — HOSPITAL ENCOUNTER (OUTPATIENT)
Dept: INFUSION CENTER | Facility: HOSPITAL | Age: 58
Discharge: HOME | End: 2023-01-16
Attending: INTERNAL MEDICINE

## 2023-01-16 VITALS — TEMPERATURE: 98.3 F

## 2023-01-16 NOTE — PROGRESS NOTES
Catheter maintenance performed per protocol without incident  Next infusion appointment scheduled in 6 weeks  AVS declined

## 2023-02-27 ENCOUNTER — HOSPITAL ENCOUNTER (OUTPATIENT)
Dept: INFUSION CENTER | Facility: HOSPITAL | Age: 58
Discharge: HOME | End: 2023-02-27
Attending: INTERNAL MEDICINE

## 2023-02-27 VITALS — TEMPERATURE: 97.5 F

## 2023-02-27 NOTE — PROGRESS NOTES
Port flushed per protocol without incident  Pt offers no complaints  Discharged in stable condition and next port flush appointment scheduled in 6 weeks  AVS declined

## 2023-03-09 ENCOUNTER — APPOINTMENT (OUTPATIENT)
Dept: LAB | Facility: CLINIC | Age: 58
End: 2023-03-09

## 2023-03-09 DIAGNOSIS — C73 MALIGNANT NEOPLASM OF THYROID GLAND (HCC): ICD-10-CM

## 2023-03-09 DIAGNOSIS — R73.03 PREDIABETES: ICD-10-CM

## 2023-03-09 DIAGNOSIS — E89.0 POSTOPERATIVE HYPOTHYROIDISM: ICD-10-CM

## 2023-03-09 DIAGNOSIS — M81.0 AGE-RELATED OSTEOPOROSIS WITHOUT CURRENT PATHOLOGICAL FRACTURE: ICD-10-CM

## 2023-03-09 LAB
ALBUMIN SERPL BCP-MCNC: 3.8 G/DL (ref 3.5–5)
ALP SERPL-CCNC: 86 U/L (ref 46–116)
ALT SERPL W P-5'-P-CCNC: 19 U/L (ref 12–78)
ANION GAP SERPL CALCULATED.3IONS-SCNC: 8 MMOL/L (ref 4–13)
AST SERPL W P-5'-P-CCNC: 21 U/L (ref 5–45)
BILIRUB SERPL-MCNC: 0.37 MG/DL (ref 0.2–1)
BUN SERPL-MCNC: 8 MG/DL (ref 5–25)
CALCIUM SERPL-MCNC: 9.4 MG/DL (ref 8.3–10.1)
CHLORIDE SERPL-SCNC: 99 MMOL/L (ref 96–108)
CO2 SERPL-SCNC: 29 MMOL/L (ref 21–32)
CREAT SERPL-MCNC: 0.72 MG/DL (ref 0.6–1.3)
GFR SERPL CREATININE-BSD FRML MDRD: 93 ML/MIN/1.73SQ M
GLUCOSE P FAST SERPL-MCNC: 102 MG/DL (ref 65–99)
POTASSIUM SERPL-SCNC: 3.9 MMOL/L (ref 3.5–5.3)
PROT SERPL-MCNC: 7.5 G/DL (ref 6.4–8.4)
SODIUM SERPL-SCNC: 136 MMOL/L (ref 135–147)
T4 FREE SERPL-MCNC: 1.67 NG/DL (ref 0.76–1.46)
TSH SERPL DL<=0.05 MIU/L-ACNC: 0.16 UIU/ML (ref 0.45–4.5)

## 2023-03-10 LAB
EST. AVERAGE GLUCOSE BLD GHB EST-MCNC: 111 MG/DL
HBA1C MFR BLD: 5.5 %

## 2023-03-14 LAB
THYROGLOB AB SERPL-ACNC: <1 IU/ML (ref 0–0.9)
THYROGLOB SERPL-MCNC: <0.1 NG/ML (ref 1.5–38.5)

## 2023-03-29 NOTE — PROGRESS NOTES
Established Patient Progress Note       Chief Complaint   Patient presents with   • Thyroid Cancer   • Osteoporosis        History of Present Illness:     Geno Acevedo is a 62 y o  female with a history of thyroid CA s/p thyroidectomy, postsurgical hypothyroidism, and osteoporosis presenting to the office today for follow-up  Past medical history significant for Crohn's disease for which she is currently receiving Stelara injections  To review:  Patient underwent a total thyroidectomy in August 2007  1 8 cm left papillary carcinoma without evidence of lymph node or vascular invasion  Thyroglobulin remains undetectable as of 3/9/2023  TSH is overly suppressed  Free T4 is elevated  It was recommend that patient take 112mcg Monday-Saturday  Take 1/2 tablet on Sunday  She notes increased agitation/anxiety, tremor, and intermittent palpitations  Component      Latest Ref Rng & Units 9/12/2022 3/9/2023          10:07 AM  9:24 AM   TSH 3RD GENERATON      0 450 - 4 500 uIU/mL 0 501 0 159 (L)     Component      Latest Ref Rng & Units 7/13/2022 3/9/2023          12:21 PM  9:24 AM   Free T4      0 76 - 1 46 ng/dL 1 37 1 67 (H)     Component      Latest Ref Rng & Units 3/24/2022 3/9/2023          11:41 AM  9:24 AM   Thyroglobulin-VAISHALI      1 5 - 38 5 ng/mL <0 1 (L) <0 1 (L)     For osteoporosis, she is receiving yearly Reclast infusions  First infusion was received on 5/2/2022  Did note increased arthralgias for approximately 1 to 2 days after  Her next infusion is due in May 2023  Next DEXA scan is due in July 2023    Patient Active Problem List   Diagnosis   • Biliary dyskinesia   • Gallbladder polyp   • Crohn's disease ([facility] )   • Encounter for care related to vascular access port   • Cancer ([facility] )   • GERD (gastroesophageal reflux disease)   • Hiatal hernia   • Benign neoplasm of colon   • Chronic neck pain   • Cobalamin deficiency   • Crohn's disease of colon ([address] )   • Degeneration of thoracic intervertebral disc   • Diverticular disease of colon   • History of colonic polyps   • Internal hemorrhoids without complication   • Malignant neoplasm of thyroid gland (HCC)   • Symptomatic menopausal or female climacteric states   • Osteoporosis   • Postoperative hypothyroidism   • Sleep disorder   • GEETHA III (vulvar intraepithelial neoplasia III)   • Achilles tendinitis of left lower extremity   • Anorexia symptom      Past Medical History:   Diagnosis Date   • Cancer ([facility] [address] )     thyroid,vulvar   • Chronic pain disorder    • Crohn's disease ([facility] )    • Disease of thyroid gland    • GERD (gastroesophageal reflux disease)    • Hiatal hernia    • Hyperlipidemia       Past Surgical History:   Procedure Laterality Date   • CERVICAL FUSION      C 6-7   • COLONOSCOPY  2004    ,,,,   • EGD     • EGD     • HYSTERECTOMY     • NECK SURGERY       plates and screws in neck bewtween 6-7   • [place] INSJ TUNNELED CTR VAD W/SUBQ PORT AGE 5 YR/> Left 10/10/2019    Procedure: INSERTION VENOUS PORT (PORT-A-CATH);   Surgeon: Brandon Farris MD;  Location: [facility] MAIN OR;  Service: General   • [place] LAPS SURG CHOLECYSTECTOMY [place] N/A 2018    Procedure: LAPAROSCOPIC CHOLECYSTECTOMY WITH CHOLANGIOGRAM;  Surgeon: Brandon Farris MD;  Location: [facility] MAIN OR;  Service: General   • SIMPLE VULVECTOMY  2014   • THYROIDECTOMY        Family History   Problem Relation Age of Onset   • Crohn's disease Mother    • Heart disease Father    • No Known Problems Sister    • No Known Problems Maternal Grandmother    • No Known Problems Paternal Grandmother      Social History     Tobacco Use   • Smoking status: Former     Packs/day: 0 50     Years: 15 00     Pack years: 7 50     Types: Cigarettes     Quit date: 2007     Years since quittin 2   • Smokeless tobacco: Never   Substance Use Topics   • Alcohol use: Yes     Comment: occ wine     Allergies   Allergen Reactions   • Mercaptopurine Rash Cancer occurrence--this medication is for Chrohn's disease   • Penicillins Lightheadedness   • Humira [Adalimumab] Rash   • Remicade [Infliximab] Rash       Current Outpatient Medications:   •  ADVANCED EVENING PRIMROSE OIL PO, Take 2,000 mg by mouth daily , Disp: , Rfl:   •  Black Cohosh 40 MG CAPS, Take 1 capsule by mouth daily , Disp: , Rfl:   •  lidocaine-prilocaine (EMLA) cream, APPLY A SMALL AMOUNT OVER PORT SITE 30 60 MINUTES PRIOR TO USE, Disp: , Rfl: 1  •  Multiple Vitamin (MULTI VITAMIN DAILY PO), Take by mouth daily, Disp: , Rfl:   •  omeprazole (PriLOSEC) 20 mg delayed release capsule, 40 mg , Disp: , Rfl:   •  pravastatin (PRAVACHOL) 20 mg tablet, Take 20 mg by mouth daily, Disp: , Rfl:   •  sucralfate (CARAFATE) 1 g tablet, , Disp: , Rfl:   •  Synthroid 112 MCG tablet, Take 1 tablet (112 mcg total) by mouth daily Take one tablet 6 days weekly  , Disp: 90 tablet, Rfl: 1  •  ustekinumab (Stelara) 45 mg/0 5 mL SOSY subcutaneous injection, , Disp: , Rfl:   •  valsartan-hydrochlorothiazide (DIOVAN-HCT) 80-12 5 MG per tablet, , Disp: , Rfl:     Review of Systems   Constitutional: Positive for fatigue  Negative for activity change, appetite change and unexpected weight change  HENT: Negative for dental problem, sore throat, trouble swallowing and voice change  Eyes: Negative for visual disturbance  Respiratory: Negative for cough, chest tightness and shortness of breath  Cardiovascular: Negative for chest pain, palpitations and leg swelling  Gastrointestinal: Negative for constipation, diarrhea, nausea and vomiting  Endocrine: Positive for cold intolerance  Negative for heat intolerance  Genitourinary: Negative for frequency  Musculoskeletal: Negative for arthralgias, back pain, gait problem and myalgias  Skin: Negative for wound  Allergic/Immunologic: Positive for environmental allergies  Negative for food allergies  Neurological: Positive for tremors   Negative for dizziness, weakness, light-headedness, numbness and headaches  Psychiatric/Behavioral: Positive for sleep disturbance  Negative for decreased concentration and dysphoric mood  The patient is nervous/anxious  Physical Exam:  Body mass index is 19 53 kg/m²  /72   Pulse 59   Ht 5' (1 524 m)   Wt 45 4 kg (100 lb)   SpO2 100%   BMI 19 53 kg/m²    Wt Readings from Last 3 Encounters:   03/31/23 45 4 kg (100 lb)   09/30/22 44 kg (97 lb)   03/24/22 44 kg (97 lb)       Physical Exam  Vitals reviewed  Constitutional:       General: She is not in acute distress  Appearance: She is well-developed  She is not ill-appearing  HENT:      Head: Normocephalic and atraumatic  Eyes:      Pupils: Pupils are equal, round, and reactive to light  Neck:      Thyroid: No thyromegaly (Thyroid surgically absent)  Cardiovascular:      Rate and Rhythm: Normal rate and regular rhythm  Pulses: Normal pulses  Heart sounds: Normal heart sounds  Pulmonary:      Effort: Pulmonary effort is normal       Breath sounds: Normal breath sounds  Abdominal:      General: Bowel sounds are normal  There is no distension  Palpations: Abdomen is soft  Tenderness: There is no abdominal tenderness  Musculoskeletal:      Cervical back: Normal range of motion and neck supple  Right lower leg: No edema  Left lower leg: No edema  Lymphadenopathy:      Cervical: No cervical adenopathy  Skin:     General: Skin is warm and dry  Capillary Refill: Capillary refill takes less than 2 seconds  Neurological:      Mental Status: She is alert and oriented to person, place, and time        Gait: Gait normal    Psychiatric:         Mood and Affect: Mood normal          Behavior: Behavior normal          Labs:       Lab Results   Component Value Date    CREATININE 0 72 03/09/2023    CREATININE 0 70 08/22/2022    CREATININE 0 68 03/24/2022    BUN 8 03/09/2023    K 3 9 03/09/2023    CL 99 03/09/2023    CO2 29 03/09/2023 eGFR   Date Value Ref Range Status   03/09/2023 93 ml/min/1 73sq m Final       Lab Results   Component Value Date    HDL 50 08/22/2022    TRIG 223 (H) 08/22/2022       Lab Results   Component Value Date    ALT 19 03/09/2023    AST 21 03/09/2023    ALKPHOS 86 03/09/2023       Lab Results   Component Value Date    FREET4 1 67 (H) 03/09/2023         Impression & Plan:    Problem List Items Addressed This Visit        Endocrine    Malignant neoplasm of thyroid gland (HCC)     Thyroglobulin remains undetectable  No evidence of recurrence of disease  Relevant Medications    Synthroid 112 MCG tablet    Postoperative hypothyroidism     TSH is overly suppressed with an elevated free T4  Reduce brand Synthroid to 112 mcg 6 days weekly  Repeat labs in 4 to 6 weeks  Relevant Medications    Synthroid 112 MCG tablet    Other Relevant Orders    TSH, 3rd generation with Free T4 reflex       Musculoskeletal and Integument    Osteoporosis - Primary     Patient is due for next Reclast infusion in May 2023  Encouraged her to hydrate well prior and use Tylenol as indicated for transient arthralgias  Relevant Orders    DXA bone density spine hip and pelvis       Orders Placed This Encounter   Procedures   • DXA bone density spine hip and pelvis     L forearm as well, please     Standing Status:   Future     Standing Expiration Date:   3/31/2027     Scheduling Instructions:      Please wear comfortable clothing with no metal buttons, zippers, snaps or an underwire bra  Do not take any calcium supplements 24 hours prior to your test  Please bring your insurance cards, a form of photo ID and a list of your medications with you  Arrive 5-10 minutes prior to your appointment time in order to register  Your study cannot be performed if you take your calcium supplement 24 hours before the scheduled Dexa scan examination              To schedule this appointment, please contact Central Scheduling at [phone]  Order Specific Question:   Reason for Exam:     Answer:   osteoporosis on Reclast     Order Specific Question:   Is the patient pregnant? Answer:   No   • TSH, 3rd generation with Free T4 reflex     Standing Status:   Future     Standing Expiration Date:   3/31/2024       Patient Instructions   1  Complete thyroid labs in the beginning of May  2  Reclast infusion due in May  3  Next Dexa scan anytime after July 22, 2023        Discussed with the patient and all questioned fully answered  She will call me if any problems arise  Follow-up appointment in 6 months       Counseled patient on diagnostic results, prognosis, risk and benefit of treatment options, instruction for management, importance of treatment compliance, Risk  factor reduction and impressions      NICOLAS Simpson

## 2023-03-31 ENCOUNTER — OFFICE VISIT (OUTPATIENT)
Dept: ENDOCRINOLOGY | Facility: CLINIC | Age: 58
End: 2023-03-31

## 2023-03-31 VITALS
HEART RATE: 59 BPM | HEIGHT: 60 IN | WEIGHT: 100 LBS | BODY MASS INDEX: 19.63 KG/M2 | SYSTOLIC BLOOD PRESSURE: 138 MMHG | OXYGEN SATURATION: 100 % | DIASTOLIC BLOOD PRESSURE: 72 MMHG

## 2023-03-31 DIAGNOSIS — M81.0 AGE-RELATED OSTEOPOROSIS WITHOUT CURRENT PATHOLOGICAL FRACTURE: Primary | ICD-10-CM

## 2023-03-31 DIAGNOSIS — E89.0 POSTOPERATIVE HYPOTHYROIDISM: ICD-10-CM

## 2023-03-31 DIAGNOSIS — C73 MALIGNANT NEOPLASM OF THYROID GLAND (HCC): ICD-10-CM

## 2023-03-31 RX ORDER — LEVOTHYROXINE SODIUM 112 MCG
112 TABLET ORAL DAILY
Qty: 90 TABLET | Refills: 1 | Status: SHIPPED | OUTPATIENT
Start: 2023-03-31

## 2023-03-31 NOTE — PATIENT INSTRUCTIONS
Complete thyroid labs in the beginning of May     Reclast infusion due in May  Next Dexa scan anytime after July 22, 2023

## 2023-03-31 NOTE — LETTER
To Whom it May Concern,    Hardy Velazquez 1965 is under my care for osteoporosis  She is receiving yearly Reclast infusions for treatment  Please be advised that it is acceptable to use the patient's port for her Reclast therapy  If you have any questions or concerns, please contact the office  Thank you  Sincerely,    Jeanine Moreno

## 2023-03-31 NOTE — ASSESSMENT & PLAN NOTE
Patient is due for next Reclast infusion in May 2023  Encouraged her to hydrate well prior and use Tylenol as indicated for transient arthralgias

## 2023-04-04 RX ORDER — ZOLEDRONIC ACID 5 MG/100ML
5 INJECTION, SOLUTION INTRAVENOUS ONCE
OUTPATIENT
Start: 2023-05-02

## 2023-04-04 RX ORDER — SODIUM CHLORIDE 9 MG/ML
20 INJECTION, SOLUTION INTRAVENOUS ONCE
OUTPATIENT
Start: 2023-05-02

## 2023-04-04 NOTE — ASSESSMENT & PLAN NOTE
TSH is overly suppressed with an elevated free T4  Reduce brand Synthroid to 112 mcg 6 days weekly  Repeat labs in 4 to 6 weeks

## 2023-05-04 ENCOUNTER — APPOINTMENT (OUTPATIENT)
Dept: LAB | Facility: CLINIC | Age: 58
End: 2023-05-04

## 2023-05-04 DIAGNOSIS — E89.0 POSTOPERATIVE HYPOTHYROIDISM: ICD-10-CM

## 2023-05-04 DIAGNOSIS — M81.0 AGE-RELATED OSTEOPOROSIS WITHOUT CURRENT PATHOLOGICAL FRACTURE: ICD-10-CM

## 2023-05-04 LAB
ALBUMIN SERPL BCP-MCNC: 3.7 G/DL (ref 3.5–5)
ALP SERPL-CCNC: 83 U/L (ref 46–116)
ALT SERPL W P-5'-P-CCNC: 21 U/L (ref 12–78)
ANION GAP SERPL CALCULATED.3IONS-SCNC: 1 MMOL/L (ref 4–13)
AST SERPL W P-5'-P-CCNC: 21 U/L (ref 5–45)
BILIRUB SERPL-MCNC: 0.24 MG/DL (ref 0.2–1)
BUN SERPL-MCNC: 8 MG/DL (ref 5–25)
CALCIUM SERPL-MCNC: 9.1 MG/DL (ref 8.3–10.1)
CHLORIDE SERPL-SCNC: 104 MMOL/L (ref 96–108)
CO2 SERPL-SCNC: 32 MMOL/L (ref 21–32)
CREAT SERPL-MCNC: 0.73 MG/DL (ref 0.6–1.3)
GFR SERPL CREATININE-BSD FRML MDRD: 91 ML/MIN/1.73SQ M
GLUCOSE SERPL-MCNC: 88 MG/DL (ref 65–140)
POTASSIUM SERPL-SCNC: 3.8 MMOL/L (ref 3.5–5.3)
PROT SERPL-MCNC: 7.4 G/DL (ref 6.4–8.4)
SODIUM SERPL-SCNC: 137 MMOL/L (ref 135–147)

## 2023-05-05 ENCOUNTER — HOSPITAL ENCOUNTER (OUTPATIENT)
Dept: INFUSION CENTER | Facility: HOSPITAL | Age: 58
End: 2023-05-05

## 2023-05-05 VITALS
RESPIRATION RATE: 16 BRPM | SYSTOLIC BLOOD PRESSURE: 132 MMHG | HEIGHT: 60 IN | DIASTOLIC BLOOD PRESSURE: 84 MMHG | OXYGEN SATURATION: 99 % | WEIGHT: 102.29 LBS | TEMPERATURE: 97.3 F | BODY MASS INDEX: 20.08 KG/M2 | HEART RATE: 65 BPM

## 2023-05-05 DIAGNOSIS — E89.0 POSTOPERATIVE HYPOTHYROIDISM: ICD-10-CM

## 2023-05-05 DIAGNOSIS — M81.0 AGE-RELATED OSTEOPOROSIS WITHOUT CURRENT PATHOLOGICAL FRACTURE: Primary | ICD-10-CM

## 2023-05-05 RX ORDER — SODIUM CHLORIDE 9 MG/ML
20 INJECTION, SOLUTION INTRAVENOUS ONCE
Status: COMPLETED | OUTPATIENT
Start: 2023-05-05 | End: 2023-05-05

## 2023-05-05 RX ORDER — LEVOTHYROXINE SODIUM 112 MCG
112 TABLET ORAL DAILY
Qty: 90 TABLET | Refills: 1 | Status: SHIPPED | OUTPATIENT
Start: 2023-05-05

## 2023-05-05 RX ORDER — ZOLEDRONIC ACID 5 MG/100ML
5 INJECTION, SOLUTION INTRAVENOUS ONCE
Status: COMPLETED | OUTPATIENT
Start: 2023-05-05 | End: 2023-05-05

## 2023-05-05 RX ORDER — SODIUM CHLORIDE 9 MG/ML
20 INJECTION, SOLUTION INTRAVENOUS ONCE
OUTPATIENT
Start: 2024-05-01

## 2023-05-05 RX ORDER — ZOLEDRONIC ACID 5 MG/100ML
5 INJECTION, SOLUTION INTRAVENOUS ONCE
OUTPATIENT
Start: 2024-05-01

## 2023-05-05 RX ADMIN — SODIUM CHLORIDE 20 ML/HR: 0.9 INJECTION, SOLUTION INTRAVENOUS at 10:08

## 2023-05-05 RX ADMIN — ZOLEDRONIC ACID 5 MG: 5 INJECTION, SOLUTION INTRAVENOUS at 10:11

## 2023-05-05 NOTE — PROGRESS NOTES
Pt denies any recent or upcoming dental work or issues  Blood work reviewed and within parameters for treatment today  Pt tolearted reclast infusion well without incident and was discharged in stable condition  Next port flush appointment scheduled but AVS declined

## 2023-05-11 NOTE — TELEPHONE ENCOUNTER
Patient called and stated she is getting a letter from [id number] stating that they're faxing us papers and they wont fill this medication, until the papers are filled out and faxed back to [phone]

## 2023-05-12 NOTE — TELEPHONE ENCOUNTER
Have not received paperwork as of yet will call josephine and see if we can verbally process over the phone

## 2023-05-15 ENCOUNTER — APPOINTMENT (OUTPATIENT)
Dept: LAB | Facility: CLINIC | Age: 58
End: 2023-05-15

## 2023-05-15 LAB — TSH SERPL DL<=0.05 MIU/L-ACNC: 1.46 UIU/ML (ref 0.45–4.5)

## 2023-05-16 DIAGNOSIS — E89.0 POSTOPERATIVE HYPOTHYROIDISM: Primary | ICD-10-CM

## 2023-06-16 RX ORDER — DEXTROSE MONOHYDRATE 50 MG/ML
20 INJECTION, SOLUTION INTRAVENOUS ONCE
Status: COMPLETED | OUTPATIENT
Start: 2023-06-19 | End: 2023-06-19

## 2023-06-16 RX ORDER — DIPHENHYDRAMINE HCL 25 MG
25 TABLET ORAL ONCE
Status: DISCONTINUED | OUTPATIENT
Start: 2023-06-19 | End: 2023-06-23 | Stop reason: HOSPADM

## 2023-06-16 RX ORDER — ACETAMINOPHEN 325 MG/1
650 TABLET ORAL ONCE
Status: DISCONTINUED | OUTPATIENT
Start: 2023-06-19 | End: 2023-06-23 | Stop reason: HOSPADM

## 2023-06-19 ENCOUNTER — HOSPITAL ENCOUNTER (OUTPATIENT)
Dept: INFUSION CENTER | Facility: HOSPITAL | Age: 58
Discharge: HOME | End: 2023-06-19
Payer: MEDICARE

## 2023-06-19 VITALS
RESPIRATION RATE: 16 BRPM | OXYGEN SATURATION: 100 % | DIASTOLIC BLOOD PRESSURE: 70 MMHG | TEMPERATURE: 98.6 F | SYSTOLIC BLOOD PRESSURE: 130 MMHG | HEART RATE: 51 BPM

## 2023-06-19 PROCEDURE — 96365 THER/PROPH/DIAG IV INF INIT: CPT

## 2023-06-19 PROCEDURE — 96361 HYDRATE IV INFUSION ADD-ON: CPT

## 2023-06-19 PROCEDURE — 96375 TX/PRO/DX INJ NEW DRUG ADDON: CPT

## 2023-06-19 RX ORDER — FAMOTIDINE 10 MG/ML
20 INJECTION, SOLUTION INTRAVENOUS EVERY 12 HOURS SCHEDULED
Status: DISCONTINUED | OUTPATIENT
Start: 2023-06-19 | End: 2023-06-23 | Stop reason: HOSPADM

## 2023-06-19 RX ORDER — FAMOTIDINE 10 MG/ML
INJECTION, SOLUTION INTRAVENOUS
Status: COMPLETED
Start: 2023-06-19 | End: 2023-06-19

## 2023-06-19 RX ORDER — DIPHENHYDRAMINE HYDROCHLORIDE 50 MG/ML
25 INJECTION INTRAMUSCULAR; INTRAVENOUS
Status: ACTIVE | OUTPATIENT
Start: 2023-06-19 | End: 2023-06-19

## 2023-06-19 RX ORDER — METHYLPREDNISOLONE SODIUM SUCCINATE 40 MG/ML
10 INJECTION, POWDER, LYOPHILIZED, FOR SOLUTION INTRAMUSCULAR; INTRAVENOUS
Status: ACTIVE | OUTPATIENT
Start: 2023-06-19 | End: 2023-06-19

## 2023-06-19 RX ADMIN — SODIUM CHLORIDE 500 ML: 0.9 INJECTION, SOLUTION INTRAVENOUS at 12:46

## 2023-06-19 RX ADMIN — DEXTROSE 20 ML/HR: 5 SOLUTION INTRAVENOUS at 12:00

## 2023-06-19 RX ADMIN — FAMOTIDINE 20 MG: 10 INJECTION, SOLUTION INTRAVENOUS at 13:54

## 2023-06-19 RX ADMIN — METHYLPREDNISOLONE SODIUM SUCCINATE 10 MG: 40 INJECTION, POWDER, FOR SOLUTION INTRAMUSCULAR; INTRAVENOUS at 12:51

## 2023-06-19 RX ADMIN — DIPHENHYDRAMINE HYDROCHLORIDE 25 MG: 50 INJECTION INTRAMUSCULAR; INTRAVENOUS at 12:50

## 2023-06-19 RX ADMIN — DEXTROSE 600 MG: 5 SOLUTION INTRAVENOUS at 12:20

## 2023-06-19 NOTE — PROGRESS NOTES
Spoke with radha mancini  Pt can have all meds stopped for today and as long as her vs are stable and shes feeling ok she may be discharged  Office to determine what med plan will be decided going forward

## 2023-06-19 NOTE — PROGRESS NOTES
Patient advised that [facility] Leema office would be calling her with next medication plan  Patient is aware if she develops chest pain or SOB to go to the ER for evaluation  If she develops rash or hives she will call Chiquita's office  Patient verbalized understanding of above  Patient agrees with above plan  Patient discharged in stable condition  All personal belongings taken with patient  Patient ambulated off unit without incident

## 2023-06-19 NOTE — PROGRESS NOTES
12:46 PM Patient called stating that she felt lightheaded and hot  Patient's checks appeared flush  Edward Mech stopped immediately  Patient received approximately 92 cc of medication at this time (22 minutes into infusion)  Patient denies SOB, chest pain, rash, hives, or pruritus  Hypersensitivity kit initiated  12:50 PM - Benadryl 25 mg   12:51 PM - Solu Medrol 10 mg   12:54 PM - Pepcid 20 mg   1:08 PM Patient reports symptoms have resolved patient is at baseline  Call placed to [facility] to advise of above  Requested she call back to review above  Spoke with Katie Fried in their office  Patient remains stable  Will continue to monitor

## 2023-06-19 NOTE — PLAN OF CARE
Office note dictated    Past Medical History:   Diagnosis Date   • Anxiety    • BMI 38.0-38.9,adult    • Chronic insomnia    • Cyclic citrullinated peptide (CCP) antibody positive    • Depression     not treated   • Engages in vaping    • H/O fetal loss 2016   • Herpes    • Ingrown toenail    • Marijuana smoker, continuous    • Prior pregnancy with fetal demise    • RA (rheumatoid arthritis) (CMS/HCC)    • Rheumatoid arthritis (CMS/HCC)    • Right knee pain    • Seropositive rheumatoid arthritis (CMS/HCC)    • Sleep apnea     Referrred to sleep study.       ALLERGIES:   Allergen Reactions   • Pamprin [Apap-Parabrom-Pyrilamine] HIVES     States possible allergy to caffeine portion    • Prednisone Palpitations       Current Outpatient Medications   Medication Sig Dispense Refill   • Omega 3 1000 MG capsule Take 1,000 mg by mouth daily.     • VITAMIN D, ERGOCALCIFEROL, PO Take 2,000 Int'l Units by mouth.     • ADALimumab (HUMIRA PEN) 40 MG/0.4ML citrate free Inject 1 pen into the skin every 14 days. 6 each 1   • traZODone (DESYREL) 100 MG tablet TAKE 1 TABLET BY MOUTH EVERY NIGHT 90 tablet 0   • fluticasone (FLONASE) 50 MCG/ACT nasal spray Spray 2 sprays in each nostril daily. 16 g 6   • omeprazole (PriLOSEC) 40 MG capsule Take 1 capsule by mouth daily. (Patient taking differently: Take 40 mg by mouth daily. Takes PRN) 30 capsule 3   • tiZANidine (ZANAFLEX) 4 MG tablet Take 1 tablet by mouth nightly as needed (Muscle Spasms). Each prescription must last 30 days. (Patient taking differently: Take 4 mg by mouth nightly as needed (Muscle Spasms). Each prescription must last 30 days.    Takes every night) 30 tablet 2   • hydroxychloroquine (Plaquenil) 200 MG tablet Take 1 tablet by mouth 2 times daily. (Patient taking differently: Take 200 mg by mouth 2 times daily. Stopped 8/10/2021) 180 tablet 1   • sulfaSALAzine (AZULFIDINE) 500 MG tablet Take 1 tablet by mouth 3 times daily. (Patient taking differently: Take 500 mg  Problem: Potential for Falls  Goal: Patient will remain free of falls  Description: INTERVENTIONS:  - Educate patient/family on patient safety including physical limitations  - Instruct patient to call for assistance with activity   - Consult OT/PT to assist with strengthening/mobility   - Keep Call bell within reach  - Keep bed low and locked with side rails adjusted as appropriate  - Keep care items and personal belongings within reach  - Initiate and maintain comfort rounds  - Make Fall Risk Sign visible to staff  -- Apply yellow socks and bracelet for high fall risk patients  - Consider moving patient to room near nurses station  Outcome: Progressing     Problem: Knowledge Deficit  Goal: Patient/family/caregiver demonstrates understanding of disease process, treatment plan, medications, and discharge instructions  Description: Complete learning assessment and assess knowledge base    Interventions:  - Provide teaching at level of understanding  - Provide teaching via preferred learning methods  Outcome: Progressing by mouth 2 times daily. Plans on increasing, restarted 8/23/2021 stopped taking 8/12/2021) 90 tablet 1   • ondansetron (ZOFRAN) 4 MG tablet Take 1 tablet by mouth every 8 hours as needed for Nausea. 15 tablet 1   • busPIRone (BUSPAR) 7.5 MG tablet Take 1 tablet by mouth 2 times daily. (Patient taking differently: Take 7.5 mg by mouth 2 times daily. Takes PRN) 180 tablet 1   • hydrOXYzine (VISTARIL) 50 MG capsule Take 1 capsule by mouth 3 times daily as needed for Anxiety. (Patient taking differently: Take 50 mg by mouth 3 times daily as needed for Anxiety. Takes 1 time daily) 270 capsule 0   • folic acid (FOLATE) 1 MG tablet Take two tablets by mouth daily 180 tablet 3   • diclofenac (VOLTAREN) 1 % gel Apply 1 g topically 4 times daily as needed for Pain. Apply to the hand .DX M05.79 300 g 0   • valACYclovir (VALTREX) 500 MG tablet Take 1 tablet by mouth daily. 90 tablet 3   • levonorgestrel (MIRENA, 52 MG,) 20 MCG/24HR IUD 1 each by Intrauterine route 1 time. 2nd Mirena Inserted 10/6/2020  NDC 70490-559-19  Lot [id number]  Exp 11/2022     • acetaZOLAMIDE (DIAMOX) 250 MG tablet Take 1 tablet by mouth 3 times daily. 90 tablet 0     No current facility-administered medications for this visit.       Social History     Tobacco Use   • Smoking status: Former Smoker     Packs/day: 0.25     Years: 2.00     Pack years: 0.50     Types: Cigarettes   • Smokeless tobacco: Never Used   • Tobacco comment: vapes   Vaping Use   • Vaping Use: never used   • Substances: Flavoring   • Devices: Refillable tank, no nicotine   Substance Use Topics   • Alcohol use: Not Currently   • Drug use: Yes     Frequency: 7.0 times per week     Types: Marijuana     Comment: Uses \"daily\"       Family History   Problem Relation Age of Onset   • Thyroid Father         removed   • Sleep Apnea Father    • Gastrointestinal Brother    • Other Brother         Gluten intolerance        Review of Systems: 14 point review of systems is negative other than what  has been mentioned above.    General Exam:    Visit Vitals  /70 (BP Location: LUE - Left upper extremity, Patient Position: Sitting, Cuff Size: Large Adult)   Pulse 88   Ht 5' 3\" (1.6 m)   Wt 109.4 kg   BMI 42.71 kg/m²       In no acute distress. No cardiac murmurs. No carotid bruits. Extremities without edema.    Noticeable obesity.    Neurology Exam:    Attention span and concentration: Normal. Awake, alert   Orientation: Oriented to person time and place.  Speech and language functions: Normal. Can repeat well. Can understand well.  Memory and Knowledge: Normal recent and remote.  Current events Good. Vocabulary Good  Cranial nerves:   II. Visual fields are full. Pupils are round equal and react to light.   III,IV, VI. Extraocular movements were full. No Afferent pupillary defect. No Internuclear ophthalmoplegia  V ,VII. Facial sensation and musculature were normal bilaterally.  VIII. Normal hearing to finger rub.  IX,X. Palate in the midline with a good gag.   XI. Normal sternocleidomastoid and trapezius muscle.  XII. Tongue in the midline  Ophthalmoscopic examination:  Papilledema with blurred disc margins bilaterally  Motor examination upper and lower extremities: Normal tone and bulk. No drift. No abnormal motor activity. Strength was 5 out of 5 bilaterally in the upper and lower extremities proximally and distally.  Sensory examination in the upper and lower extremities: Normal to light touch, pinprick, Temperature, vibration, and proprioception.  Deep tendon reflexes in the upper extremities: Normal and symmetric in the biceps and triceps  and brachioradialis bilaterally.  Deep tendon reflexes in the lower extremities: Knee jerks Normal bilaterally. Ankle jerks normal bilaterally  Plantar reflexes:  Flexor bilaterally.  Ankle Clonus: None bilaterally  Jaw jerk: Normal.     Charles: Negative bilaterally  Coordination: Normal finger-nose-finger, heel-to-shin, and rapid alternating movement.  Gait:  Normal. Toe normal. Heel normal. Tandem normal  Romberg sign: Normal    Recent Labs   Lab 05/04/21  0902 04/23/21  0757 12/22/20  1519   WBC 6.8 7.6 8.5   RBC 4.38 4.39 3.96*   HGB 13.5 13.3 12.1   HCT 39.5 40.2 37.3   MCV 90.2 91.6 94.2   MCH 30.8 30.3 30.6   MCHC 34.2 33.1 32.4   RDW-CV 11.6 11.8 12.3    270 280     Recent Labs   Lab 08/31/21  1620 08/12/21  1024 05/04/21  0902 04/23/21  0757 10/27/20  1205   Fasting Status  --   --  12 12 0   Sodium  --  139 141 141 139   Potassium  --  4.2 4.4 4.5 4.1   Chloride  --  105 104 105 102   Carbon Dioxide  --  26 26 28 28   Anion Gap  --  12 15 13 13   Glucose  --  104* 95 95 95   Creatinine 1.04* 1.00* 0.99* 1.07* 1.01*   BUN/ Creatinine Ratio  --  19 17 16 19   GPT  --  36 29 39 38   Alkaline Phosphatase  --  69 65 75 94   Globulin  --  3.9 3.4 3.3 3.3   A/G Ratio  --  0.9* 1.1 1.1 1.2     Recent Labs   Lab 10/27/20  1205   RBC Sedimentation Rate 18       Recent Labs   Lab 08/12/21  1024 09/14/20  1504   TSH 1.363 1.054       Lab Results   Component Value Date    CHOLESTEROL 153 09/14/2020    HDL 43 (L) 09/14/2020    CALCLDL 89 09/14/2020    TRIGLYCERIDE 104 09/14/2020

## 2023-07-19 ENCOUNTER — APPOINTMENT (OUTPATIENT)
Dept: LAB | Facility: CLINIC | Age: 58
End: 2023-07-19
Payer: MEDICARE

## 2023-07-19 DIAGNOSIS — R31.9 HEMATURIA, UNSPECIFIED TYPE: ICD-10-CM

## 2023-07-19 PROCEDURE — 87086 URINE CULTURE/COLONY COUNT: CPT

## 2023-07-20 LAB — BACTERIA UR CULT: NORMAL

## 2023-07-24 ENCOUNTER — HOSPITAL ENCOUNTER (OUTPATIENT)
Dept: BONE DENSITY | Facility: HOSPITAL | Age: 58
Discharge: HOME | End: 2023-07-24
Payer: MEDICARE

## 2023-07-24 ENCOUNTER — HOSPITAL ENCOUNTER (OUTPATIENT)
Dept: INFUSION CENTER | Facility: HOSPITAL | Age: 58
Discharge: HOME | End: 2023-07-24
Attending: INTERNAL MEDICINE
Payer: MEDICARE

## 2023-07-24 VITALS — BODY MASS INDEX: 19.69 KG/M2 | WEIGHT: 100.31 LBS | HEIGHT: 60 IN

## 2023-07-24 VITALS — TEMPERATURE: 97.3 F

## 2023-07-24 DIAGNOSIS — M81.0 AGE-RELATED OSTEOPOROSIS WITHOUT CURRENT PATHOLOGICAL FRACTURE: ICD-10-CM

## 2023-07-24 PROCEDURE — 96523 IRRIG DRUG DELIVERY DEVICE: CPT

## 2023-07-24 PROCEDURE — 77080 DXA BONE DENSITY AXIAL: CPT

## 2023-07-24 NOTE — PROGRESS NOTES
Brisk blood return noted from [facility] port & flushed per protocol. Next appt scheduled. Discharged in stable condition.

## 2023-08-02 ENCOUNTER — HOSPITAL ENCOUNTER (OUTPATIENT)
Dept: CT IMAGING | Facility: HOSPITAL | Age: 58
Discharge: HOME | End: 2023-08-02
Attending: INTERNAL MEDICINE
Payer: MEDICARE

## 2023-08-02 DIAGNOSIS — R31.9 HEMATURIA, UNSPECIFIED TYPE: ICD-10-CM

## 2023-08-02 PROCEDURE — G1004 CDSM NDSC: HCPCS

## 2023-08-02 PROCEDURE — 74178 CT ABD&PLV WO CNTR FLWD CNTR: CPT

## 2023-08-02 RX ADMIN — IOHEXOL 100 ML: 350 INJECTION, SOLUTION INTRAVENOUS at 08:35

## 2023-08-23 ENCOUNTER — APPOINTMENT (OUTPATIENT)
Dept: LAB | Facility: CLINIC | Age: 58
End: 2023-08-23
Payer: MEDICARE

## 2023-08-23 DIAGNOSIS — R23.2 HOT FLASHES: ICD-10-CM

## 2023-08-23 DIAGNOSIS — E27.8 ADRENAL MASS (HCC): Primary | ICD-10-CM

## 2023-08-23 DIAGNOSIS — Z11.59 NEED FOR HEPATITIS C SCREENING TEST: ICD-10-CM

## 2023-08-23 LAB
ALBUMIN SERPL BCP-MCNC: 4.3 G/DL (ref 3.5–5)
ALP SERPL-CCNC: 62 U/L (ref 34–104)
ALT SERPL W P-5'-P-CCNC: 20 U/L (ref 7–52)
ANION GAP SERPL CALCULATED.3IONS-SCNC: 7 MMOL/L
AST SERPL W P-5'-P-CCNC: 25 U/L (ref 13–39)
BILIRUB SERPL-MCNC: 0.35 MG/DL (ref 0.2–1)
BUN SERPL-MCNC: 10 MG/DL (ref 5–25)
CALCIUM SERPL-MCNC: 9.4 MG/DL (ref 8.4–10.2)
CHLORIDE SERPL-SCNC: 99 MMOL/L (ref 96–108)
CHOLEST SERPL-MCNC: 174 MG/DL
CO2 SERPL-SCNC: 33 MMOL/L (ref 21–32)
CORTIS AM PEAK SERPL-MCNC: 10.9 UG/DL (ref 6.7–22.6)
CREAT SERPL-MCNC: 0.7 MG/DL (ref 0.6–1.3)
ERYTHROCYTE [DISTWIDTH] IN BLOOD BY AUTOMATED COUNT: 13.5 % (ref 11.6–15.1)
GFR SERPL CREATININE-BSD FRML MDRD: 95 ML/MIN/1.73SQ M
GLUCOSE SERPL-MCNC: 66 MG/DL (ref 65–140)
HCT VFR BLD AUTO: 37.1 % (ref 34.8–46.1)
HCV AB SER QL: NORMAL
HDLC SERPL-MCNC: 80 MG/DL
HGB BLD-MCNC: 12.3 G/DL (ref 11.5–15.4)
LDLC SERPL CALC-MCNC: 75 MG/DL (ref 0–100)
MCH RBC QN AUTO: 31 PG (ref 26.8–34.3)
MCHC RBC AUTO-ENTMCNC: 33.2 G/DL (ref 31.4–37.4)
MCV RBC AUTO: 94 FL (ref 82–98)
NONHDLC SERPL-MCNC: 94 MG/DL
PLATELET # BLD AUTO: 221 THOUSANDS/UL (ref 149–390)
PMV BLD AUTO: 12.8 FL (ref 8.9–12.7)
POTASSIUM SERPL-SCNC: 3.7 MMOL/L (ref 3.5–5.3)
PROT SERPL-MCNC: 7 G/DL (ref 6.4–8.4)
RBC # BLD AUTO: 3.97 MILLION/UL (ref 3.81–5.12)
SODIUM SERPL-SCNC: 139 MMOL/L (ref 135–147)
T4 FREE SERPL-MCNC: 1.07 NG/DL (ref 0.61–1.12)
TRIGL SERPL-MCNC: 95 MG/DL
TSH SERPL DL<=0.05 MIU/L-ACNC: 0.81 UIU/ML (ref 0.45–4.5)
WBC # BLD AUTO: 8.13 THOUSAND/UL (ref 4.31–10.16)

## 2023-08-23 PROCEDURE — 82533 TOTAL CORTISOL: CPT

## 2023-08-23 PROCEDURE — 36415 COLL VENOUS BLD VENIPUNCTURE: CPT

## 2023-08-23 PROCEDURE — 86803 HEPATITIS C AB TEST: CPT

## 2023-08-23 PROCEDURE — 84443 ASSAY THYROID STIM HORMONE: CPT

## 2023-08-23 PROCEDURE — 83835 ASSAY OF METANEPHRINES: CPT

## 2023-08-23 PROCEDURE — 84439 ASSAY OF FREE THYROXINE: CPT

## 2023-08-23 PROCEDURE — 80053 COMPREHEN METABOLIC PANEL: CPT

## 2023-08-23 PROCEDURE — 82088 ASSAY OF ALDOSTERONE: CPT

## 2023-08-23 PROCEDURE — 85027 COMPLETE CBC AUTOMATED: CPT

## 2023-08-23 PROCEDURE — 80061 LIPID PANEL: CPT

## 2023-08-23 PROCEDURE — 84244 ASSAY OF RENIN: CPT

## 2023-08-29 LAB
ALDOST SERPL-MCNC: 11.1 NG/DL (ref 0–30)
ALDOST/RENIN PLAS-RTO: 4.7 {RATIO} (ref 0–30)
METANEPH FREE SERPL-MCNC: 23.4 PG/ML (ref 0–88)
NORMETANEPHRINE SERPL-MCNC: 58.6 PG/ML (ref 0–244)
RENIN PLAS-CCNC: 2.37 NG/ML/HR (ref 0.17–5.38)

## 2023-09-05 ENCOUNTER — HOSPITAL ENCOUNTER (OUTPATIENT)
Dept: INFUSION CENTER | Facility: HOSPITAL | Age: 58
Discharge: HOME | End: 2023-09-05
Payer: MEDICARE

## 2023-09-05 VITALS — TEMPERATURE: 97.4 F

## 2023-09-05 PROCEDURE — 96523 IRRIG DRUG DELIVERY DEVICE: CPT

## 2023-09-05 NOTE — PROGRESS NOTES
Port flushed per protocol without incident. Next port flush appointment scheduled and AVS declined. Pt discharged in stable condition.

## 2023-09-20 ENCOUNTER — HOSPITAL ENCOUNTER (OUTPATIENT)
Dept: MAMMOGRAPHY | Facility: HOSPITAL | Age: 58
Discharge: HOME | End: 2023-09-20
Attending: INTERNAL MEDICINE
Payer: MEDICARE

## 2023-09-20 VITALS — WEIGHT: 100 LBS | BODY MASS INDEX: 19.63 KG/M2 | HEIGHT: 60 IN

## 2023-09-20 DIAGNOSIS — Z12.31 ENCOUNTER FOR SCREENING MAMMOGRAM FOR MALIGNANT NEOPLASM OF BREAST: ICD-10-CM

## 2023-09-20 PROCEDURE — 77067 SCR MAMMO BI INCL CAD: CPT

## 2023-09-20 PROCEDURE — 77063 BREAST TOMOSYNTHESIS BI: CPT

## 2023-09-29 NOTE — PROGRESS NOTES
Established Patient Progress Note       Chief Complaint   Patient presents with   • Follow-up        Impression & Plan:    Problem List Items Addressed This Visit        Endocrine    Malignant tumor of thyroid gland ([address])     No evidence of recurrence of disease. Repeat thyroglobulin in spring 2024. Relevant Medications    Synthroid 112 MCG tablet    Postoperative hypothyroidism - Primary     Patient is clinically and biochemically euthyroid. Continue 112 mcg of brand Synthroid 6 days weekly. Repeat thyroid function test in 6 months. Patient is to notify me should she develop symptoms of hypothyroidism or oversupplementation. Relevant Medications    Synthroid 112 MCG tablet    Other Relevant Orders    TSH, 3rd generation with Free T4 reflex    Adrenal adenoma     Benign lipid rich adrenal adenoma. Musculoskeletal and Integument    Osteoporosis     Reviewed DEXA scan with patient. She is tolerating Reclast well. Next infusion due in May 2024. Next DEXA scan due in July 2025. Continue calcium and vitamin D. Continue with weightbearing activity as tolerated. Reviewed fall precautions. Other    Crohn's disease ([address])     Continue Rinvoq. Relevant Medications    Upadacitinib ER (Rinvoq) 45 MG TB24       Orders Placed This Encounter   Procedures   • TSH, 3rd generation with Free T4 reflex     Standing Status:   Future     Standing Expiration Date:   10/3/2024   • Thyroglobulin w/ab Lab Collect     Standing Status:   Future     Standing Expiration Date:   10/3/2024       History of Present Illness:     Artist Alfred is a 62 y.o. female with a history of thyroid CA s/p thyroidectomy, postsurgical hypothyroidism, and osteoporosis presenting to the office today for follow-up.  Past medical history significant for Crohn's disease for which she is currently receiving Stelara injections.      To review:  Patient underwent a total thyroidectomy in August 2007.   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 For hypothyroidism, she is taking 112 mcg of brand Synthroid daily. She reports taking this consistently and correctly. TSH and free T4 from 8/23/2023 are stable. Thyroglobulin from March 9, 2023 is undetectable. For osteoporosis, she is receiving yearly Reclast infusions. DEXA scan from 7/24/2023 demonstrated 7.4% statistically significant increase in density of the lumbar spine. She denies any side effects to her Reclast infusions. Denies any recent falls or fractures. She is supplementing calcium and vitamin D daily. She does engage in weightbearing activity regularly. Since patient's last appointment, she did develop 2 episodes of hematuria. Initially, she was treated with ciprofloxacin which resolved hematuria. However, this started again after completing antibiotics. Therefore, patient had a CT scan of kidneys which demonstrated an adrenal nodule. Morning cortisol, metanephrines, and michaelle/renin ratio were evaluated and all found to be normal.     ADRENAL GLANDS: 1.2 x 1.1 cm left adrenal nodule which measures less than 10 Hounsfield units on the unenhanced portion of the study compatible with a lipid rich adrenal adenoma.   Patient Active Problem List   Diagnosis   • Biliary dyskinesia   • Gallbladder polyp   • Crohn's disease ([address])   • Encounter for care related to vascular access port   • Cancer ([address])   • Gastroesophageal reflux disease   • Hiatal hernia   • Benign neoplasm of colon   • Chronic neck pain   • Cobalamin deficiency   • Crohn's disease of colon ([address])   • Degeneration of thoracic intervertebral disc   • Diverticular disease of colon   • History of colonic polyps   • Internal hemorrhoids without complication   • Malignant tumor of thyroid gland ([address])   • Menopausal flushing   • Osteoporosis   • Postoperative hypothyroidism   • Sleep disorder   • GEETHA III (vulvar intraepithelial neoplasia III)   • Achilles tendinitis of left lower extremity   • Anorexia symptom   • Adrenal adenoma      Past Medical History:   Diagnosis Date   • Cancer ([address])     thyroid,vulvar   • Chronic pain disorder    • Crohn's disease ([address])    • Disease of thyroid gland    • GERD (gastroesophageal reflux disease)    • Hiatal hernia    • Hyperlipidemia       Past Surgical History:   Procedure Laterality Date   • CERVICAL FUSION      C 6-7   • COLONOSCOPY  2004    ,,,,2018   • EGD     • EGD     • HYSTERECTOMY     • NECK SURGERY       plates and screws in neck bewtween 6-7   • [place] INSJ TUNNELED CTR VAD W/SUBQ PORT AGE 5 YR/> Left 10/10/2019    Procedure: INSERTION VENOUS PORT (PORT-A-CATH);   Surgeon: Griselda Revere, MD;  Location: [address] [place] [address] OR;  Service: General   • [place] LAPS SURG CHOLECYSTECTOMY Lajean Blunt N/A 2018    Procedure: LAPAROSCOPIC CHOLECYSTECTOMY WITH CHOLANGIOGRAM;  Surgeon: Griselda Revere, MD;  Location: [address] [place] [address] OR;  Service: General   • SIMPLE VULVECTOMY  2014   • THYROIDECTOMY        Family History   Problem Relation Age of Onset   • Crohn's disease Mother    • Osteoporosis Mother            • Heart disease Father    • Diabetes type II Father         Is under control   • Hypertension Father    • No Known Problems Sister    • No Known Problems Maternal Grandmother    • No Known Problems Paternal Grandmother    • Hypertension Brother    • Thyroid cancer Family         Me,removed     Social History     Tobacco Use   • Smoking status: Former     Packs/day: 0.50     Years: 15.00     Total pack years: 7.50     Types: Cigarettes     Quit date: 2007     Years since quittin.7   • Smokeless tobacco: Never   Substance Use Topics   • Alcohol use: Yes     Comment: occ wine     Allergies   Allergen Reactions   • Mercaptopurine Rash     Cancer occurrence--this medication is for Chrohn's disease   • Penicillins Lightheadedness   • Skyrizi [Risankizumab] Dizziness and Lightheadedness   • Humira [Adalimumab] Rash   • Remicade [Infliximab] Rash       Current Outpatient Medications:   •  Multiple Vitamin (MULTI VITAMIN DAILY PO), Take by mouth daily, Disp: , Rfl:   •  Omega-3 Fatty Acids (fish oil) 1,000 mg, Take 1,000 mg by mouth daily, Disp: , Rfl:   •  omeprazole (PriLOSEC) 20 mg delayed release capsule, 40 mg , Disp: , Rfl:   •  pravastatin (PRAVACHOL) 20 mg tablet, Take 20 mg by mouth daily, Disp: , Rfl:   •  sucralfate (CARAFATE) 1 g tablet, 2 (two) times a day as needed, Disp: , Rfl:   •  Synthroid 112 MCG tablet, Take 1 tablet (112 mcg total) by mouth daily Take one tablet 6 days weekly. , Disp: 90 tablet, Rfl: 1  •  Upadacitinib ER (Rinvoq) 45 MG TB24, Take 45 mg by mouth in the morning, Disp: , Rfl:   •  valsartan-hydrochlorothiazide (DIOVAN-HCT) 80-12.5 MG per tablet, , Disp: , Rfl:   •  venlafaxine (EFFEXOR-XR) 37.5 mg 24 hr capsule, Take 37.5 mg by mouth daily Take with food, Disp: , Rfl:     Review of Systems   Constitutional: Negative for activity change, appetite change, fatigue and unexpected weight change. HENT: Negative for dental problem, sore throat, trouble swallowing and voice change. Eyes: Negative for visual disturbance. Respiratory: Negative for cough, chest tightness and shortness of breath. Cardiovascular: Negative for chest pain, palpitations and leg swelling. Gastrointestinal: Negative for constipation, diarrhea, nausea and vomiting. Endocrine: Positive for heat intolerance (+ hot flashes). Negative for cold intolerance. Genitourinary: Negative for frequency. Musculoskeletal: Positive for arthralgias and myalgias. Negative for back pain and gait problem. Skin: Negative for wound. Allergic/Immunologic: Positive for environmental allergies. Negative for food allergies. Neurological: Negative for dizziness, tremors, weakness, light-headedness, numbness and headaches.    Psychiatric/Behavioral: Negative for decreased concentration, dysphoric mood and sleep disturbance. The patient is not nervous/anxious. Physical Exam:  Body mass index is 19.69 kg/m². /72 (BP Location: Left arm, Patient Position: Sitting, Cuff Size: Adult)   Pulse 72   Temp 97.5 °F (36.4 °C) (Temporal)   Resp 16   Ht 5' (1.524 m)   Wt 45.7 kg (100 lb 12.8 oz)   SpO2 98%   BMI 19.69 kg/m²    Wt Readings from Last 3 Encounters:   10/03/23 45.7 kg (100 lb 12.8 oz)   09/20/23 45.4 kg (100 lb)   07/24/23 45.5 kg (100 lb 5 oz)       Physical Exam  Vitals reviewed. Constitutional:       General: She is not in acute distress. Appearance: She is well-developed. She is not ill-appearing. HENT:      Head: Normocephalic and atraumatic. Eyes:      Pupils: Pupils are equal, round, and reactive to light. Neck:      Thyroid: No thyroid mass. Comments: Thyroid surgically absent. Cardiovascular:      Rate and Rhythm: Normal rate and regular rhythm. Pulses: Normal pulses. Heart sounds: Normal heart sounds. Pulmonary:      Effort: Pulmonary effort is normal.      Breath sounds: Normal breath sounds. Abdominal:      General: Bowel sounds are normal. There is no distension. Palpations: Abdomen is soft. Tenderness: There is no abdominal tenderness. Musculoskeletal:      Cervical back: Normal range of motion and neck supple. Right lower leg: No edema. Left lower leg: No edema. Lymphadenopathy:      Cervical: No cervical adenopathy. Skin:     General: Skin is warm and dry. Capillary Refill: Capillary refill takes less than 2 seconds. Neurological:      Mental Status: She is alert and oriented to person, place, and time.       Gait: Gait normal.   Psychiatric:         Mood and Affect: Mood normal.         Behavior: Behavior normal.         Labs:     Lab Results   Component Value Date    CREATININE 0.70 08/23/2023    CREATININE 0.73 05/04/2023    CREATININE 0.72 03/09/2023    BUN 10 08/23/2023    K 3.7 08/23/2023    CL 99 08/23/2023    CO2 33 (H) 08/23/2023     eGFR   Date Value Ref Range Status   08/23/2023 95 ml/min/1.73sq m Final       Lab Results   Component Value Date    HDL 80 08/23/2023    TRIG 95 08/23/2023       Lab Results   Component Value Date    ALT 20 08/23/2023    AST 25 08/23/2023    ALKPHOS 62 08/23/2023       Lab Results   Component Value Date    FREET4 1.07 08/23/2023       There are no Patient Instructions on file for this visit. Discussed with the patient and all questioned fully answered. She will call me if any problems arise. Follow-up appointment in 6 months.      Counseled patient on diagnostic results, prognosis, risk and benefit of treatment options, instruction for management, importance of treatment compliance, Risk  factor reduction and impressions    Renetta Jacobs, [address]

## 2023-10-03 ENCOUNTER — OFFICE VISIT (OUTPATIENT)
Dept: ENDOCRINOLOGY | Facility: CLINIC | Age: 58
End: 2023-10-03
Payer: MEDICARE

## 2023-10-03 VITALS
DIASTOLIC BLOOD PRESSURE: 72 MMHG | HEART RATE: 72 BPM | OXYGEN SATURATION: 98 % | WEIGHT: 100.8 LBS | HEIGHT: 60 IN | SYSTOLIC BLOOD PRESSURE: 120 MMHG | BODY MASS INDEX: 19.79 KG/M2 | TEMPERATURE: 97.5 F | RESPIRATION RATE: 16 BRPM

## 2023-10-03 DIAGNOSIS — M81.0 AGE-RELATED OSTEOPOROSIS WITHOUT CURRENT PATHOLOGICAL FRACTURE: ICD-10-CM

## 2023-10-03 DIAGNOSIS — C73 MALIGNANT NEOPLASM OF THYROID GLAND (HCC): ICD-10-CM

## 2023-10-03 DIAGNOSIS — D35.00 ADRENAL ADENOMA, UNSPECIFIED LATERALITY: ICD-10-CM

## 2023-10-03 DIAGNOSIS — E89.0 POSTOPERATIVE HYPOTHYROIDISM: Primary | ICD-10-CM

## 2023-10-03 DIAGNOSIS — K50.919 CROHN'S DISEASE WITH COMPLICATION, UNSPECIFIED GASTROINTESTINAL TRACT LOCATION (HCC): ICD-10-CM

## 2023-10-03 PROBLEM — E03.9 HYPOTHYROIDISM: Status: ACTIVE | Noted: 2023-04-07

## 2023-10-03 PROBLEM — K21.9 GASTROESOPHAGEAL REFLUX DISEASE: Status: ACTIVE | Noted: 2023-05-19

## 2023-10-03 PROBLEM — E03.9 HYPOTHYROIDISM: Status: RESOLVED | Noted: 2023-04-07 | Resolved: 2023-10-03

## 2023-10-03 PROCEDURE — 99214 OFFICE O/P EST MOD 30 MIN: CPT | Performed by: NURSE PRACTITIONER

## 2023-10-03 RX ORDER — UPADACITINIB 45 MG/1
45 TABLET, EXTENDED RELEASE ORAL DAILY
COMMUNITY

## 2023-10-03 RX ORDER — CIPROFLOXACIN 500 MG/1
TABLET, FILM COATED ORAL
COMMUNITY
Start: 2023-07-10 | End: 2023-10-03

## 2023-10-03 RX ORDER — VENLAFAXINE HYDROCHLORIDE 37.5 MG/1
37.5 CAPSULE, EXTENDED RELEASE ORAL DAILY
COMMUNITY
Start: 2023-09-07

## 2023-10-03 RX ORDER — LEVOTHYROXINE SODIUM 112 MCG
112 TABLET ORAL DAILY
Qty: 90 TABLET | Refills: 1 | Status: SHIPPED | OUTPATIENT
Start: 2023-10-03

## 2023-10-03 RX ORDER — CHLORAL HYDRATE 500 MG
1000 CAPSULE ORAL DAILY
COMMUNITY

## 2023-10-03 NOTE — ASSESSMENT & PLAN NOTE
Patient is clinically and biochemically euthyroid. Continue 112 mcg of brand Synthroid 6 days weekly. Repeat thyroid function test in 6 months. Patient is to notify me should she develop symptoms of hypothyroidism or oversupplementation.

## 2023-10-03 NOTE — ASSESSMENT & PLAN NOTE
Reviewed DEXA scan with patient. She is tolerating Reclast well. Next infusion due in May 2024. Next DEXA scan due in July 2025. Continue calcium and vitamin D. Continue with weightbearing activity as tolerated. Reviewed fall precautions.

## 2023-10-12 ENCOUNTER — APPOINTMENT (OUTPATIENT)
Dept: RADIOLOGY | Facility: CLINIC | Age: 58
End: 2023-10-12
Payer: MEDICARE

## 2023-10-12 DIAGNOSIS — M25.561 RIGHT KNEE PAIN, UNSPECIFIED CHRONICITY: ICD-10-CM

## 2023-10-12 PROCEDURE — 73562 X-RAY EXAM OF KNEE 3: CPT

## 2023-10-17 ENCOUNTER — HOSPITAL ENCOUNTER (OUTPATIENT)
Dept: INFUSION CENTER | Facility: HOSPITAL | Age: 58
Discharge: HOME | End: 2023-10-17
Attending: INTERNAL MEDICINE
Payer: MEDICARE

## 2023-10-17 VITALS — TEMPERATURE: 97.9 F

## 2023-10-17 PROCEDURE — 96523 IRRIG DRUG DELIVERY DEVICE: CPT

## 2023-10-17 NOTE — PROGRESS NOTES
Port flushed freely. Good blood return noted. Port deaccessed without issue. Patient tolerated well. AVS given to patient. Patient ambulated off unit without incident. All personal belongings taken with patient.

## 2023-11-08 NOTE — TELEPHONE ENCOUNTER
----- Message from [address] Placedo sent at 7/22/2021 12:03 PM EDT -----  DEXA scan reviewed  Patient has had a decrease in bone density  I recommend starting osteoporosis treatment  We discussed potential treatment options including oral bisphosphonates, IV bisphosphonate Reclast, or twice yearly Prolia injections  I would recommend starting one of these  If she would like to discuss these further, please make an appointment and we will review the options again  no

## 2023-11-28 ENCOUNTER — HOSPITAL ENCOUNTER (OUTPATIENT)
Dept: INFUSION CENTER | Facility: HOSPITAL | Age: 58
Discharge: HOME | End: 2023-11-28
Attending: INTERNAL MEDICINE

## 2023-11-28 ENCOUNTER — LAB REQUISITION (OUTPATIENT)
Dept: LAB | Facility: HOSPITAL | Age: 58
End: 2023-11-28
Payer: MEDICARE

## 2023-11-28 DIAGNOSIS — K63.9 DISEASE OF INTESTINE, UNSPECIFIED: ICD-10-CM

## 2023-11-28 DIAGNOSIS — M07.60 ENTEROPATHIC ARTHROPATHIES, UNSPECIFIED SITE: ICD-10-CM

## 2023-11-28 LAB
ALBUMIN SERPL BCP-MCNC: 4.3 G/DL (ref 3.5–5)
ALP SERPL-CCNC: 63 U/L (ref 34–104)
ALT SERPL W P-5'-P-CCNC: 16 U/L (ref 7–52)
ANION GAP SERPL CALCULATED.3IONS-SCNC: 8 MMOL/L
AST SERPL W P-5'-P-CCNC: 19 U/L (ref 13–39)
BASOPHILS # BLD AUTO: 0.04 THOUSANDS/ÂΜL (ref 0–0.1)
BASOPHILS NFR BLD AUTO: 1 % (ref 0–1)
BILIRUB SERPL-MCNC: 0.33 MG/DL (ref 0.2–1)
BUN SERPL-MCNC: 10 MG/DL (ref 5–25)
CALCIUM SERPL-MCNC: 8.8 MG/DL (ref 8.4–10.2)
CHLORIDE SERPL-SCNC: 100 MMOL/L (ref 96–108)
CO2 SERPL-SCNC: 29 MMOL/L (ref 21–32)
CREAT SERPL-MCNC: 0.62 MG/DL (ref 0.6–1.3)
CRP SERPL QL: 2.6 MG/L
EOSINOPHIL # BLD AUTO: 0.11 THOUSAND/ÂΜL (ref 0–0.61)
EOSINOPHIL NFR BLD AUTO: 1 % (ref 0–6)
ERYTHROCYTE [DISTWIDTH] IN BLOOD BY AUTOMATED COUNT: 13.3 % (ref 11.6–15.1)
ERYTHROCYTE [SEDIMENTATION RATE] IN BLOOD: 22 MM/HOUR (ref 0–29)
GFR SERPL CREATININE-BSD FRML MDRD: 99 ML/MIN/1.73SQ M
GLUCOSE SERPL-MCNC: 106 MG/DL (ref 65–140)
HCT VFR BLD AUTO: 36.9 % (ref 34.8–46.1)
HGB BLD-MCNC: 11.7 G/DL (ref 11.5–15.4)
IMM GRANULOCYTES # BLD AUTO: 0.03 THOUSAND/UL (ref 0–0.2)
IMM GRANULOCYTES NFR BLD AUTO: 0 % (ref 0–2)
LYMPHOCYTES # BLD AUTO: 1.91 THOUSANDS/ÂΜL (ref 0.6–4.47)
LYMPHOCYTES NFR BLD AUTO: 25 % (ref 14–44)
MCH RBC QN AUTO: 31.8 PG (ref 26.8–34.3)
MCHC RBC AUTO-ENTMCNC: 31.7 G/DL (ref 31.4–37.4)
MCV RBC AUTO: 100 FL (ref 82–98)
MONOCYTES # BLD AUTO: 0.92 THOUSAND/ÂΜL (ref 0.17–1.22)
MONOCYTES NFR BLD AUTO: 12 % (ref 4–12)
NEUTROPHILS # BLD AUTO: 4.63 THOUSANDS/ÂΜL (ref 1.85–7.62)
NEUTS SEG NFR BLD AUTO: 61 % (ref 43–75)
NRBC BLD AUTO-RTO: 0 /100 WBCS
PLATELET # BLD AUTO: 264 THOUSANDS/UL (ref 149–390)
PMV BLD AUTO: 11.1 FL (ref 8.9–12.7)
POTASSIUM SERPL-SCNC: 3.5 MMOL/L (ref 3.5–5.3)
PROT SERPL-MCNC: 7 G/DL (ref 6.4–8.4)
RBC # BLD AUTO: 3.68 MILLION/UL (ref 3.81–5.12)
SODIUM SERPL-SCNC: 137 MMOL/L (ref 135–147)
WBC # BLD AUTO: 7.64 THOUSAND/UL (ref 4.31–10.16)

## 2023-11-28 PROCEDURE — 80053 COMPREHEN METABOLIC PANEL: CPT | Performed by: PHYSICIAN ASSISTANT

## 2023-11-28 PROCEDURE — 85025 COMPLETE CBC W/AUTO DIFF WBC: CPT | Performed by: PHYSICIAN ASSISTANT

## 2023-11-28 PROCEDURE — 85652 RBC SED RATE AUTOMATED: CPT | Performed by: PHYSICIAN ASSISTANT

## 2023-11-28 PROCEDURE — 86140 C-REACTIVE PROTEIN: CPT | Performed by: PHYSICIAN ASSISTANT

## 2024-01-09 ENCOUNTER — HOSPITAL ENCOUNTER (OUTPATIENT)
Dept: INFUSION CENTER | Facility: HOSPITAL | Age: 59
Discharge: HOME | End: 2024-01-09
Payer: MEDICARE

## 2024-01-09 VITALS — TEMPERATURE: 97.5 F

## 2024-01-09 PROCEDURE — 96523 IRRIG DRUG DELIVERY DEVICE: CPT

## 2024-01-09 NOTE — PROGRESS NOTES
Port flushed per protocol   Jessica Larson is aware of future appt on 2/20/24 at 1130   AVS declined

## 2024-02-20 ENCOUNTER — HOSPITAL ENCOUNTER (OUTPATIENT)
Dept: INFUSION CENTER | Facility: HOSPITAL | Age: 59
Discharge: HOME | End: 2024-02-20
Attending: INTERNAL MEDICINE
Payer: MEDICARE

## 2024-02-20 VITALS — TEMPERATURE: 97.8 F

## 2024-02-20 PROCEDURE — 96523 IRRIG DRUG DELIVERY DEVICE: CPT

## 2024-02-20 NOTE — PROGRESS NOTES
Paper orders reviewed for port flush.  Port flushed freely.  Good blood return noted.  Port deaccessed without issue.  Patient tolerated well.   Patient reports that 2 weeks ago she started with constant pain under right breast by her rib cage.  Pain 9/10.  Worsens with inspiration.  Denies SOB or cardiac chest pain.  Denies fall or trauma to area.  Patient advised to contact her family doctor today for evaluation.  Patient stated that she would call her family doctor right after this appointment.    Jessica Larson is aware of future appt on 4/2/24 at 1100.     AVS- No (Declined by Jessica Larson)     Patient ambulated off unit without incident.  All personal belongings taken with patient.

## 2024-04-02 ENCOUNTER — HOSPITAL ENCOUNTER (OUTPATIENT)
Dept: INFUSION CENTER | Facility: HOSPITAL | Age: 59
Discharge: HOME | End: 2024-04-02
Payer: MEDICARE

## 2024-04-02 DIAGNOSIS — E89.0 POSTOPERATIVE HYPOTHYROIDISM: ICD-10-CM

## 2024-04-02 DIAGNOSIS — C73 MALIGNANT NEOPLASM OF THYROID GLAND (HCC): ICD-10-CM

## 2024-04-02 DIAGNOSIS — M81.0 AGE-RELATED OSTEOPOROSIS WITHOUT CURRENT PATHOLOGICAL FRACTURE: ICD-10-CM

## 2024-04-02 LAB
ALBUMIN SERPL BCP-MCNC: 4.3 G/DL (ref 3.5–5)
ALP SERPL-CCNC: 74 U/L (ref 34–104)
ALT SERPL W P-5'-P-CCNC: 16 U/L (ref 7–52)
ANION GAP SERPL CALCULATED.3IONS-SCNC: 7 MMOL/L (ref 4–13)
AST SERPL W P-5'-P-CCNC: 21 U/L (ref 13–39)
BILIRUB SERPL-MCNC: 0.34 MG/DL (ref 0.2–1)
BUN SERPL-MCNC: 8 MG/DL (ref 5–25)
CALCIUM SERPL-MCNC: 9.2 MG/DL (ref 8.4–10.2)
CHLORIDE SERPL-SCNC: 99 MMOL/L (ref 96–108)
CO2 SERPL-SCNC: 31 MMOL/L (ref 21–32)
CREAT SERPL-MCNC: 0.69 MG/DL (ref 0.6–1.3)
GFR SERPL CREATININE-BSD FRML MDRD: 96 ML/MIN/1.73SQ M
GLUCOSE P FAST SERPL-MCNC: 112 MG/DL (ref 65–99)
GLUCOSE SERPL-MCNC: 112 MG/DL (ref 65–140)
POTASSIUM SERPL-SCNC: 3.5 MMOL/L (ref 3.5–5.3)
PROT SERPL-MCNC: 7.2 G/DL (ref 6.4–8.4)
SODIUM SERPL-SCNC: 137 MMOL/L (ref 135–147)
T4 FREE SERPL-MCNC: 1.16 NG/DL (ref 0.61–1.12)
TSH SERPL DL<=0.05 MIU/L-ACNC: 0.03 UIU/ML (ref 0.45–4.5)

## 2024-04-02 PROCEDURE — 86800 THYROGLOBULIN ANTIBODY: CPT

## 2024-04-02 PROCEDURE — 80053 COMPREHEN METABOLIC PANEL: CPT | Performed by: STUDENT IN AN ORGANIZED HEALTH CARE EDUCATION/TRAINING PROGRAM

## 2024-04-02 PROCEDURE — 84432 ASSAY OF THYROGLOBULIN: CPT

## 2024-04-02 PROCEDURE — 84439 ASSAY OF FREE THYROXINE: CPT

## 2024-04-02 PROCEDURE — 84443 ASSAY THYROID STIM HORMONE: CPT

## 2024-04-02 NOTE — PROGRESS NOTES
Jessica Larson  tolerated central lab draw and port flush well with no complications.      Jessica Larson is aware of future appt on  5/14 11am    AVS printed and given to Jessica Larson:  No (Declined by Jessica Larson)

## 2024-04-03 LAB
THYROGLOB AB SERPL-ACNC: <1 IU/ML (ref 0–0.9)
THYROGLOB SERPL-MCNC: <0.1 NG/ML (ref 1.5–38.5)

## 2024-04-05 NOTE — PROGRESS NOTES
Established Patient Progress Note     CC: Endocrinology follow up visit    Impression & Plan:    Problem List Items Addressed This Visit       Malignant tumor of thyroid gland (HCC)     Thyroglobulin undetectable.          Relevant Medications    Synthroid 112 MCG tablet    Osteoporosis     Patient is due for her next Reclast infusion in May 2024.  Next DEXA scan due in July 2025.  Continue calcium and vitamin D supplements.  Continue with weightbearing activity.  Reviewed fall precautions.         Postoperative hypothyroidism - Primary     Patient is mildly symptomatic of over supplementation with palpitations and occasional tremor. Reduce brand Synthroid to 112 mcg Monday-Friday. Take 1/2 tab on Saturday. No dose on Sunday. Repeat labs in 6-8 weeks.          Relevant Medications    Synthroid 112 MCG tablet    Other Relevant Orders    TSH, 3rd generation with Free T4 reflex    Adrenal adenoma     Benign lipid rich adenoma.          Elevated fasting glucose     + family history of T2 diabetes. Patient has a personal history of elevated fasting glucose. Will check yearly A1C.          Relevant Orders    Hemoglobin A1C       History of Present Illness:     Jessica Larson is a 58 y.o. female with a history of thyroid CA s/p thyroidectomy, postsurgical hypothyroidism, and osteoporosis presenting to the office today for follow-up.  Past medical history significant for Crohn's disease for which she is currently receiving Stelara injections.      To review:  Patient underwent a total thyroidectomy in August 2007.  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    For hypothyroidism, she is taking 112 mcg of brand Synthroid 6 days weekly.  TSH is overly suppressed mildly elevated free T4.  Thyroglobulin undetectable.    For osteoporosis, she is receiving yearly Reclast infusions.  She is tolerating this well.  Next infusion is due in May 2024.  Next DEXA scan is due in July 2025.  She  is supplementing her vitamin D and calcium.    Patient Active Problem List   Diagnosis    Biliary dyskinesia    Gallbladder polyp    Crohn's disease (HCC)    Encounter for care related to vascular access port    Cancer (HCC)    Gastroesophageal reflux disease    Hiatal hernia    Benign neoplasm of colon    Chronic neck pain    Cobalamin deficiency    Crohn's disease of colon (HCC)    Degeneration of thoracic intervertebral disc    Diverticular disease of colon    History of colonic polyps    Internal hemorrhoids without complication    Malignant tumor of thyroid gland (HCC)    Menopausal flushing    Osteoporosis    Postoperative hypothyroidism    Sleep disorder    GEETHA III (vulvar intraepithelial neoplasia III)    Achilles tendinitis of left lower extremity    Anorexia symptom    Adrenal adenoma    Elevated fasting glucose      Past Medical History:   Diagnosis Date    Cancer (HCC)     thyroid,vulvar    Chronic pain disorder     Crohn's disease (HCC)     Disease of thyroid gland     GERD (gastroesophageal reflux disease)     Hiatal hernia     Hyperlipidemia       Past Surgical History:   Procedure Laterality Date    CERVICAL FUSION      C 6-7    COLONOSCOPY  2004    ,,,,    EGD  2012    EGD  2016    HYSTERECTOMY  2013    NECK SURGERY       plates and screws in neck bewtween 6-7    NH INSJ TUNNELED CTR VAD W/SUBQ PORT AGE 5 YR/> Left 10/10/2019    Procedure: INSERTION VENOUS PORT (PORT-A-CATH);  Surgeon: Juan Johns MD;  Location:  MAIN OR;  Service: General    NH LAPS SURG CHOLECYSTECTOMY W/CHOLANGIOGRAPHY N/A 2018    Procedure: LAPAROSCOPIC CHOLECYSTECTOMY WITH CHOLANGIOGRAM;  Surgeon: Juan Johns MD;  Location:  MAIN OR;  Service: General    SIMPLE VULVECTOMY  2014    THYROIDECTOMY        Family History   Problem Relation Age of Onset    Crohn's disease Mother     Osteoporosis Mother             Heart disease Father     Diabetes type II Father         Is  under control    Hypertension Father     No Known Problems Sister     No Known Problems Maternal Grandmother     No Known Problems Paternal Grandmother     Hypertension Brother     Thyroid cancer Family         Me,removed     Social History     Tobacco Use    Smoking status: Former     Current packs/day: 0.00     Average packs/day: 0.5 packs/day for 15.0 years (7.5 ttl pk-yrs)     Types: Cigarettes     Start date: 1992     Quit date: 2007     Years since quittin.2    Smokeless tobacco: Never   Substance Use Topics    Alcohol use: Yes     Comment: occ wine     Allergies   Allergen Reactions    Mercaptopurine Rash     Cancer occurrence--this medication is for Chrohn's disease    Penicillins Lightheadedness    Skyrizi [Risankizumab] Dizziness and Lightheadedness    Humira [Adalimumab] Rash    Remicade [Infliximab] Rash       Current Outpatient Medications:     Multiple Vitamin (MULTI VITAMIN DAILY PO), Take by mouth daily, Disp: , Rfl:     Omega-3 Fatty Acids (fish oil) 1,000 mg, Take 1,000 mg by mouth daily, Disp: , Rfl:     omeprazole (PriLOSEC) 20 mg delayed release capsule, 40 mg , Disp: , Rfl:     pravastatin (PRAVACHOL) 20 mg tablet, Take 20 mg by mouth daily, Disp: , Rfl:     Synthroid 112 MCG tablet, Take 1 tablet (112 mcg) Monday-Friday. Take 1/2 tablet (56 mcg) on Saturday. No dose on ., Disp: 66 tablet, Rfl: 1    Upadacitinib ER (Rinvoq) 15 MG TB24, Take 15 mg by mouth in the morning, Disp: , Rfl:     valsartan-hydrochlorothiazide (DIOVAN-HCT) 80-12.5 MG per tablet, , Disp: , Rfl:     venlafaxine (EFFEXOR-XR) 37.5 mg 24 hr capsule, Take 37.5 mg by mouth daily Take with food, Disp: , Rfl:     sucralfate (CARAFATE) 1 g tablet, 2 (two) times a day as needed (Patient not taking: Reported on 2024), Disp: , Rfl:     Review of Systems   Constitutional:  Positive for fatigue. Negative for activity change, appetite change and unexpected weight change.   HENT:  Negative for dental problem, sore  throat, trouble swallowing and voice change.    Eyes:  Negative for visual disturbance.   Respiratory:  Negative for cough, chest tightness and shortness of breath.    Cardiovascular:  Positive for palpitations. Negative for chest pain and leg swelling.   Gastrointestinal:  Positive for constipation and diarrhea. Negative for nausea and vomiting.   Endocrine: Positive for heat intolerance. Negative for cold intolerance, polydipsia, polyphagia and polyuria.   Genitourinary:  Negative for frequency.   Musculoskeletal:  Negative for arthralgias, back pain, gait problem and myalgias.   Skin:  Negative for wound.   Allergic/Immunologic: Positive for environmental allergies. Negative for food allergies.   Neurological:  Positive for tremors. Negative for dizziness, weakness, light-headedness, numbness and headaches.   Psychiatric/Behavioral:  Negative for decreased concentration, dysphoric mood and sleep disturbance. The patient is not nervous/anxious.        Physical Exam:  Body mass index is 19.73 kg/m².  /74 (BP Location: Left arm, Patient Position: Sitting, Cuff Size: Standard)   Pulse 74   Temp 98 °F (36.7 °C)   Ht 5' (1.524 m)   Wt 45.8 kg (101 lb)   SpO2 98%   BMI 19.73 kg/m²    Wt Readings from Last 3 Encounters:   04/09/24 45.8 kg (101 lb)   10/03/23 45.7 kg (100 lb 12.8 oz)   09/20/23 45.4 kg (100 lb)       Physical Exam  Vitals reviewed.   Constitutional:       General: She is not in acute distress.     Appearance: She is well-developed. She is not ill-appearing.   HENT:      Head: Normocephalic and atraumatic.   Eyes:      Pupils: Pupils are equal, round, and reactive to light.   Neck:      Thyroid: No thyromegaly.      Comments: Thyroid surgically absent.   Cardiovascular:      Rate and Rhythm: Normal rate.      Pulses: Normal pulses.   Pulmonary:      Effort: Pulmonary effort is normal.   Musculoskeletal:      Cervical back: Normal range of motion and neck supple.      Right lower leg: No edema.       Left lower leg: No edema.   Lymphadenopathy:      Cervical: No cervical adenopathy.   Skin:     General: Skin is warm and dry.      Capillary Refill: Capillary refill takes less than 2 seconds.   Neurological:      Mental Status: She is alert and oriented to person, place, and time.      Gait: Gait normal.   Psychiatric:         Mood and Affect: Mood normal.         Behavior: Behavior normal.         Labs:       Discussed with the patient and all questioned fully answered. She will call me if any problems arise.    Follow-up appointment in 6 months.     Counseled patient on diagnostic results, prognosis, risk and benefit of treatment options, instruction for management, importance of treatment compliance, Risk  factor reduction and impressions    NICOLAS Burt

## 2024-04-09 ENCOUNTER — OFFICE VISIT (OUTPATIENT)
Dept: ENDOCRINOLOGY | Facility: CLINIC | Age: 59
End: 2024-04-09
Payer: MEDICARE

## 2024-04-09 VITALS
HEIGHT: 60 IN | DIASTOLIC BLOOD PRESSURE: 74 MMHG | HEART RATE: 74 BPM | OXYGEN SATURATION: 98 % | WEIGHT: 101 LBS | TEMPERATURE: 98 F | BODY MASS INDEX: 19.83 KG/M2 | SYSTOLIC BLOOD PRESSURE: 120 MMHG

## 2024-04-09 DIAGNOSIS — R73.01 ELEVATED FASTING GLUCOSE: ICD-10-CM

## 2024-04-09 DIAGNOSIS — M81.0 AGE-RELATED OSTEOPOROSIS WITHOUT CURRENT PATHOLOGICAL FRACTURE: ICD-10-CM

## 2024-04-09 DIAGNOSIS — C73 MALIGNANT TUMOR OF THYROID GLAND (HCC): ICD-10-CM

## 2024-04-09 DIAGNOSIS — E89.0 POSTOPERATIVE HYPOTHYROIDISM: Primary | ICD-10-CM

## 2024-04-09 DIAGNOSIS — D35.00 ADRENAL ADENOMA, UNSPECIFIED LATERALITY: ICD-10-CM

## 2024-04-09 PROCEDURE — 99214 OFFICE O/P EST MOD 30 MIN: CPT | Performed by: NURSE PRACTITIONER

## 2024-04-09 RX ORDER — LEVOTHYROXINE SODIUM 112 MCG
TABLET ORAL
Qty: 90 TABLET | Refills: 1 | Status: SHIPPED | OUTPATIENT
Start: 2024-04-09 | End: 2024-04-09 | Stop reason: SDUPTHER

## 2024-04-09 RX ORDER — LEVOTHYROXINE SODIUM 112 MCG
TABLET ORAL
Qty: 66 TABLET | Refills: 1 | Status: SHIPPED | OUTPATIENT
Start: 2024-04-09

## 2024-04-09 RX ORDER — UPADACITINIB 15 MG/1
15 TABLET, EXTENDED RELEASE ORAL DAILY
COMMUNITY

## 2024-04-09 NOTE — ASSESSMENT & PLAN NOTE
Patient is due for her next Reclast infusion in May 2024.  Next DEXA scan due in July 2025.  Continue calcium and vitamin D supplements.  Continue with weightbearing activity.  Reviewed fall precautions.

## 2024-04-09 NOTE — ASSESSMENT & PLAN NOTE
Patient is mildly symptomatic of over supplementation with palpitations and occasional tremor. Reduce brand Synthroid to 112 mcg Monday-Friday. Take 1/2 tab on Saturday. No dose on Sunday. Repeat labs in 6-8 weeks.

## 2024-04-09 NOTE — ASSESSMENT & PLAN NOTE
+ family history of T2 diabetes. Patient has a personal history of elevated fasting glucose. Will check yearly A1C.

## 2024-04-09 NOTE — PATIENT INSTRUCTIONS
Continue 112 mcg. Take 1 full tablet Monday-Friday. Half tab on Saturday. No tab on Sunday. Repeat labs in approximately 6-8 weeks.

## 2024-05-14 ENCOUNTER — HOSPITAL ENCOUNTER (OUTPATIENT)
Dept: INFUSION CENTER | Facility: HOSPITAL | Age: 59
Discharge: HOME | End: 2024-05-14
Attending: INTERNAL MEDICINE
Payer: MEDICARE

## 2024-05-14 VITALS — TEMPERATURE: 97.7 F

## 2024-05-14 PROCEDURE — 96523 IRRIG DRUG DELIVERY DEVICE: CPT

## 2024-05-14 NOTE — PROGRESS NOTES
Port flushed per protocol without difficulty.    Pt aware of next appt on 5/23 @ 9 am for reclast infusion.    AVS declined.  Discharged in stable condition.

## 2024-05-17 ENCOUNTER — APPOINTMENT (OUTPATIENT)
Dept: LAB | Facility: CLINIC | Age: 59
End: 2024-05-17
Payer: MEDICARE

## 2024-05-17 DIAGNOSIS — E89.0 POSTOPERATIVE HYPOTHYROIDISM: ICD-10-CM

## 2024-05-17 DIAGNOSIS — R73.01 ELEVATED FASTING GLUCOSE: ICD-10-CM

## 2024-05-17 DIAGNOSIS — M81.0 AGE-RELATED OSTEOPOROSIS WITHOUT CURRENT PATHOLOGICAL FRACTURE: ICD-10-CM

## 2024-05-17 LAB
ALBUMIN SERPL BCP-MCNC: 4.5 G/DL (ref 3.5–5)
ALP SERPL-CCNC: 67 U/L (ref 34–104)
ALT SERPL W P-5'-P-CCNC: 16 U/L (ref 7–52)
ANION GAP SERPL CALCULATED.3IONS-SCNC: 6 MMOL/L (ref 4–13)
AST SERPL W P-5'-P-CCNC: 22 U/L (ref 13–39)
BILIRUB SERPL-MCNC: 0.48 MG/DL (ref 0.2–1)
BUN SERPL-MCNC: 11 MG/DL (ref 5–25)
CALCIUM SERPL-MCNC: 9.5 MG/DL (ref 8.4–10.2)
CHLORIDE SERPL-SCNC: 98 MMOL/L (ref 96–108)
CO2 SERPL-SCNC: 31 MMOL/L (ref 21–32)
CREAT SERPL-MCNC: 0.7 MG/DL (ref 0.6–1.3)
EST. AVERAGE GLUCOSE BLD GHB EST-MCNC: 108 MG/DL
GFR SERPL CREATININE-BSD FRML MDRD: 95 ML/MIN/1.73SQ M
GLUCOSE P FAST SERPL-MCNC: 93 MG/DL (ref 65–99)
HBA1C MFR BLD: 5.4 %
POTASSIUM SERPL-SCNC: 4 MMOL/L (ref 3.5–5.3)
PROT SERPL-MCNC: 7.5 G/DL (ref 6.4–8.4)
SODIUM SERPL-SCNC: 135 MMOL/L (ref 135–147)
T4 FREE SERPL-MCNC: 1.05 NG/DL (ref 0.61–1.12)
TSH SERPL DL<=0.05 MIU/L-ACNC: 0.06 UIU/ML (ref 0.45–4.5)

## 2024-05-17 PROCEDURE — 84443 ASSAY THYROID STIM HORMONE: CPT

## 2024-05-17 PROCEDURE — 83036 HEMOGLOBIN GLYCOSYLATED A1C: CPT

## 2024-05-17 PROCEDURE — 36415 COLL VENOUS BLD VENIPUNCTURE: CPT

## 2024-05-17 PROCEDURE — 84439 ASSAY OF FREE THYROXINE: CPT

## 2024-05-17 PROCEDURE — 80053 COMPREHEN METABOLIC PANEL: CPT

## 2024-05-20 DIAGNOSIS — E89.0 POSTOPERATIVE HYPOTHYROIDISM: Primary | ICD-10-CM

## 2024-05-22 NOTE — PROGRESS NOTES
Date changed in beacon for appt tomorrow 5/23 per verbal order received via TT from Perri VALENZUELA.

## 2024-05-23 ENCOUNTER — HOSPITAL ENCOUNTER (OUTPATIENT)
Dept: INFUSION CENTER | Facility: HOSPITAL | Age: 59
End: 2024-05-23
Attending: STUDENT IN AN ORGANIZED HEALTH CARE EDUCATION/TRAINING PROGRAM
Payer: MEDICARE

## 2024-05-23 VITALS
DIASTOLIC BLOOD PRESSURE: 68 MMHG | SYSTOLIC BLOOD PRESSURE: 112 MMHG | RESPIRATION RATE: 18 BRPM | TEMPERATURE: 98.2 F | OXYGEN SATURATION: 100 % | HEART RATE: 71 BPM

## 2024-05-23 DIAGNOSIS — M81.0 AGE-RELATED OSTEOPOROSIS WITHOUT CURRENT PATHOLOGICAL FRACTURE: Primary | ICD-10-CM

## 2024-05-23 PROCEDURE — 96374 THER/PROPH/DIAG INJ IV PUSH: CPT

## 2024-05-23 RX ORDER — ZOLEDRONIC ACID 5 MG/100ML
5 INJECTION, SOLUTION INTRAVENOUS ONCE
Status: COMPLETED | OUTPATIENT
Start: 2024-05-23 | End: 2024-05-23

## 2024-05-23 RX ORDER — ZOLEDRONIC ACID 5 MG/100ML
5 INJECTION, SOLUTION INTRAVENOUS ONCE
OUTPATIENT
Start: 2025-05-22

## 2024-05-23 RX ORDER — SODIUM CHLORIDE 9 MG/ML
20 INJECTION, SOLUTION INTRAVENOUS ONCE
OUTPATIENT
Start: 2025-05-22

## 2024-05-23 RX ORDER — SODIUM CHLORIDE 9 MG/ML
20 INJECTION, SOLUTION INTRAVENOUS ONCE
Status: COMPLETED | OUTPATIENT
Start: 2024-05-23 | End: 2024-05-23

## 2024-05-23 RX ADMIN — ZOLEDRONIC ACID 5 MG: 5 INJECTION, SOLUTION INTRAVENOUS at 09:28

## 2024-05-23 RX ADMIN — SODIUM CHLORIDE 20 ML/HR: 0.9 INJECTION, SOLUTION INTRAVENOUS at 09:15

## 2024-05-23 NOTE — PROGRESS NOTES
Jessica Larson  tolerated Reclast treatment well with no complications.      Jessica Larson is aware of future appt on 6/25 at 1PM.     AVS printed and given to Jessica Larson:No (Declined by Jessica Larson).

## 2024-06-25 ENCOUNTER — HOSPITAL ENCOUNTER (OUTPATIENT)
Dept: INFUSION CENTER | Facility: HOSPITAL | Age: 59
Discharge: HOME | End: 2024-06-25
Attending: INTERNAL MEDICINE
Payer: MEDICARE

## 2024-06-25 VITALS — TEMPERATURE: 97.2 F

## 2024-06-25 PROCEDURE — 96523 IRRIG DRUG DELIVERY DEVICE: CPT

## 2024-06-25 NOTE — PROGRESS NOTES
Jessica Larson  tolerated port flush well with no complications.      Jessica Larson is aware of future appt on 8/6 at 11AM.     AVS printed and given to Jessica Larson: No (Declined by Jessica Larson).

## 2024-07-12 ENCOUNTER — APPOINTMENT (OUTPATIENT)
Dept: LAB | Facility: CLINIC | Age: 59
End: 2024-07-12
Payer: MEDICARE

## 2024-07-12 DIAGNOSIS — E89.0 POSTOPERATIVE HYPOTHYROIDISM: ICD-10-CM

## 2024-07-12 LAB
T4 FREE SERPL-MCNC: 1.14 NG/DL (ref 0.61–1.12)
TSH SERPL DL<=0.05 MIU/L-ACNC: 0.11 UIU/ML (ref 0.45–4.5)

## 2024-07-12 PROCEDURE — 84439 ASSAY OF FREE THYROXINE: CPT

## 2024-07-12 PROCEDURE — 36415 COLL VENOUS BLD VENIPUNCTURE: CPT

## 2024-07-12 PROCEDURE — 84443 ASSAY THYROID STIM HORMONE: CPT

## 2024-07-15 DIAGNOSIS — E89.0 POSTOPERATIVE HYPOTHYROIDISM: Primary | ICD-10-CM

## 2024-07-15 DIAGNOSIS — E89.0 POSTOPERATIVE HYPOTHYROIDISM: ICD-10-CM

## 2024-07-15 RX ORDER — LEVOTHYROXINE SODIUM 112 MCG
TABLET ORAL
Qty: 66 TABLET | Refills: 1 | Status: SHIPPED | OUTPATIENT
Start: 2024-07-15

## 2024-08-06 ENCOUNTER — HOSPITAL ENCOUNTER (OUTPATIENT)
Dept: INFUSION CENTER | Facility: HOSPITAL | Age: 59
Discharge: HOME | End: 2024-08-06
Attending: INTERNAL MEDICINE

## 2024-08-06 NOTE — PLAN OF CARE
Problem: Potential for Falls  Goal: Patient will remain free of falls  Description: INTERVENTIONS:  - Educate patient/family on patient safety including physical limitations  - Instruct patient to call for assistance with activity   - Consult OT/PT to assist with strengthening/mobility   - Keep Call bell within reach  - Keep bed low and locked with side rails adjusted as appropriate  - Keep care items and personal belongings within reach  - Initiate and maintain comfort rounds  - Make Fall Risk Sign visible to staff  - Apply yellow socks and bracelet for high fall risk patients  - Consider moving patient to room near nurses station  8/6/2024 1105 by Mile Rascon, SYLVESTER  Outcome: Progressing  8/6/2024 1104 by Mile Rascon, SYLVESTER  Outcome: Progressing

## 2024-08-06 NOTE — PROGRESS NOTES
Port flushed freely.  Good blood return noted.  Port deaccessed without issue.  Patient tolerated well.      Jessica Larson is aware of future appt on 9/17/24 at 1100.     AVS -  No (Declined by Jessica Larson) Patient has Mychart.    Patient ambulated off unit without incident.  All personal belongings taken with patient.

## 2024-08-15 ENCOUNTER — APPOINTMENT (EMERGENCY)
Dept: CT IMAGING | Facility: HOSPITAL | Age: 59
DRG: 854 | End: 2024-08-15
Payer: MEDICARE

## 2024-08-15 ENCOUNTER — APPOINTMENT (OUTPATIENT)
Dept: RADIOLOGY | Facility: CLINIC | Age: 59
End: 2024-08-15
Payer: MEDICARE

## 2024-08-15 ENCOUNTER — HOSPITAL ENCOUNTER (INPATIENT)
Facility: HOSPITAL | Age: 59
LOS: 4 days | Discharge: HOME | DRG: 854 | End: 2024-08-19
Attending: EMERGENCY MEDICINE | Admitting: INTERNAL MEDICINE
Payer: MEDICARE

## 2024-08-15 ENCOUNTER — OFFICE VISIT (OUTPATIENT)
Dept: URGENT CARE | Facility: CLINIC | Age: 59
End: 2024-08-15
Payer: MEDICARE

## 2024-08-15 VITALS
OXYGEN SATURATION: 96 % | DIASTOLIC BLOOD PRESSURE: 72 MMHG | TEMPERATURE: 98.1 F | SYSTOLIC BLOOD PRESSURE: 147 MMHG | HEART RATE: 82 BPM | RESPIRATION RATE: 18 BRPM

## 2024-08-15 DIAGNOSIS — C80.1 CANCER (HCC): ICD-10-CM

## 2024-08-15 DIAGNOSIS — M25.461 PAIN AND SWELLING OF RIGHT KNEE: ICD-10-CM

## 2024-08-15 DIAGNOSIS — M25.561 PAIN AND SWELLING OF RIGHT KNEE: ICD-10-CM

## 2024-08-15 DIAGNOSIS — M25.561 PAIN AND SWELLING OF RIGHT KNEE: Primary | ICD-10-CM

## 2024-08-15 DIAGNOSIS — M00.9 SEPTIC ARTHRITIS (HCC): Primary | ICD-10-CM

## 2024-08-15 DIAGNOSIS — M25.461 PAIN AND SWELLING OF RIGHT KNEE: Primary | ICD-10-CM

## 2024-08-15 PROBLEM — I10 PRIMARY HYPERTENSION: Status: ACTIVE | Noted: 2024-08-15

## 2024-08-15 LAB
ALBUMIN SERPL BCG-MCNC: 4.2 G/DL (ref 3.5–5)
ALP SERPL-CCNC: 75 U/L (ref 34–104)
ALT SERPL W P-5'-P-CCNC: 31 U/L (ref 7–52)
ANION GAP SERPL CALCULATED.3IONS-SCNC: 10 MMOL/L (ref 4–13)
APPEARANCE FLD: ABNORMAL
AST SERPL W P-5'-P-CCNC: 33 U/L (ref 13–39)
BASOPHILS # BLD AUTO: 0.03 THOUSANDS/ÂΜL (ref 0–0.1)
BASOPHILS NFR BLD AUTO: 0 % (ref 0–1)
BILIRUB SERPL-MCNC: 0.6 MG/DL (ref 0.2–1)
BUN SERPL-MCNC: 9 MG/DL (ref 5–25)
CALCIUM SERPL-MCNC: 9.6 MG/DL (ref 8.4–10.2)
CHLORIDE SERPL-SCNC: 91 MMOL/L (ref 96–108)
CO2 SERPL-SCNC: 31 MMOL/L (ref 21–32)
COLOR FLD: ABNORMAL
CREAT SERPL-MCNC: 0.63 MG/DL (ref 0.6–1.3)
CRP SERPL QL: 269.8 MG/L
CRYSTALS SNV QL MICRO: NORMAL
EOSINOPHIL # BLD AUTO: 0 THOUSAND/ÂΜL (ref 0–0.61)
EOSINOPHIL NFR BLD AUTO: 0 % (ref 0–6)
ERYTHROCYTE [DISTWIDTH] IN BLOOD BY AUTOMATED COUNT: 12.9 % (ref 11.6–15.1)
ERYTHROCYTE [SEDIMENTATION RATE] IN BLOOD: 66 MM/HOUR (ref 0–29)
GFR SERPL CREATININE-BSD FRML MDRD: 98 ML/MIN/1.73SQ M
GLUCOSE SERPL-MCNC: 128 MG/DL (ref 65–140)
GLUCOSE SERPL-MCNC: 166 MG/DL (ref 65–140)
HCT VFR BLD AUTO: 37 % (ref 34.8–46.1)
HGB BLD-MCNC: 12.4 G/DL (ref 11.5–15.4)
HISTIOCYTES NFR SNV MANUAL: 6 %
IMM GRANULOCYTES # BLD AUTO: 0.05 THOUSAND/UL (ref 0–0.2)
IMM GRANULOCYTES NFR BLD AUTO: 0 % (ref 0–2)
LYMPHOCYTES # BLD AUTO: 1.22 THOUSANDS/ÂΜL (ref 0.6–4.47)
LYMPHOCYTES NFR BLD AUTO: 11 % (ref 14–44)
MCH RBC QN AUTO: 31.6 PG (ref 26.8–34.3)
MCHC RBC AUTO-ENTMCNC: 33.5 G/DL (ref 31.4–37.4)
MCV RBC AUTO: 94 FL (ref 82–98)
MONOCYTES # BLD AUTO: 1.63 THOUSAND/ÂΜL (ref 0.17–1.22)
MONOCYTES NFR BLD AUTO: 14 % (ref 4–12)
NEUTROPHILS # BLD AUTO: 8.51 THOUSANDS/ÂΜL (ref 1.85–7.62)
NEUTROPHILS NFR SNV MANUAL: 94 %
NEUTS SEG NFR BLD AUTO: 75 % (ref 43–75)
NRBC BLD AUTO-RTO: 0 /100 WBCS
PLATELET # BLD AUTO: 266 THOUSANDS/UL (ref 149–390)
PMV BLD AUTO: 10.1 FL (ref 8.9–12.7)
POTASSIUM SERPL-SCNC: 3.5 MMOL/L (ref 3.5–5.3)
PROT SERPL-MCNC: 7.9 G/DL (ref 6.4–8.4)
RBC # BLD AUTO: 3.93 MILLION/UL (ref 3.81–5.12)
SITE: ABNORMAL
SODIUM SERPL-SCNC: 132 MMOL/L (ref 135–147)
TOTAL CELLS COUNTED SPEC: 100
WBC # BLD AUTO: 11.44 THOUSAND/UL (ref 4.31–10.16)
WBC # FLD MANUAL: ABNORMAL /UL (ref 0–200)

## 2024-08-15 PROCEDURE — 86140 C-REACTIVE PROTEIN: CPT | Performed by: EMERGENCY MEDICINE

## 2024-08-15 PROCEDURE — 87070 CULTURE OTHR SPECIMN AEROBIC: CPT | Performed by: EMERGENCY MEDICINE

## 2024-08-15 PROCEDURE — 89060 EXAM SYNOVIAL FLUID CRYSTALS: CPT | Performed by: EMERGENCY MEDICINE

## 2024-08-15 PROCEDURE — 80053 COMPREHEN METABOLIC PANEL: CPT | Performed by: EMERGENCY MEDICINE

## 2024-08-15 PROCEDURE — 87102 FUNGUS ISOLATION CULTURE: CPT | Performed by: PHYSICIAN ASSISTANT

## 2024-08-15 PROCEDURE — 96374 THER/PROPH/DIAG INJ IV PUSH: CPT

## 2024-08-15 PROCEDURE — 36415 COLL VENOUS BLD VENIPUNCTURE: CPT | Performed by: EMERGENCY MEDICINE

## 2024-08-15 PROCEDURE — 99213 OFFICE O/P EST LOW 20 MIN: CPT

## 2024-08-15 PROCEDURE — 99284 EMERGENCY DEPT VISIT MOD MDM: CPT

## 2024-08-15 PROCEDURE — G0463 HOSPITAL OUTPT CLINIC VISIT: HCPCS

## 2024-08-15 PROCEDURE — 89051 BODY FLUID CELL COUNT: CPT | Performed by: EMERGENCY MEDICINE

## 2024-08-15 PROCEDURE — 87205 SMEAR GRAM STAIN: CPT | Performed by: EMERGENCY MEDICINE

## 2024-08-15 PROCEDURE — 87206 SMEAR FLUORESCENT/ACID STAI: CPT | Performed by: PHYSICIAN ASSISTANT

## 2024-08-15 PROCEDURE — 99223 1ST HOSP IP/OBS HIGH 75: CPT | Performed by: INTERNAL MEDICINE

## 2024-08-15 PROCEDURE — 73564 X-RAY EXAM KNEE 4 OR MORE: CPT

## 2024-08-15 PROCEDURE — 85025 COMPLETE CBC W/AUTO DIFF WBC: CPT | Performed by: EMERGENCY MEDICINE

## 2024-08-15 PROCEDURE — 87116 MYCOBACTERIA CULTURE: CPT | Performed by: PHYSICIAN ASSISTANT

## 2024-08-15 PROCEDURE — 73700 CT LOWER EXTREMITY W/O DYE: CPT

## 2024-08-15 PROCEDURE — 87476 LYME DIS DNA AMP PROBE: CPT | Performed by: INTERNAL MEDICINE

## 2024-08-15 PROCEDURE — 85652 RBC SED RATE AUTOMATED: CPT | Performed by: EMERGENCY MEDICINE

## 2024-08-15 PROCEDURE — 99285 EMERGENCY DEPT VISIT HI MDM: CPT | Performed by: EMERGENCY MEDICINE

## 2024-08-15 PROCEDURE — 20610 DRAIN/INJ JOINT/BURSA W/O US: CPT | Performed by: EMERGENCY MEDICINE

## 2024-08-15 PROCEDURE — 82948 REAGENT STRIP/BLOOD GLUCOSE: CPT

## 2024-08-15 RX ORDER — VANCOMYCIN HYDROCHLORIDE 1 G/200ML
1000 INJECTION, SOLUTION INTRAVENOUS ONCE
Status: COMPLETED | OUTPATIENT
Start: 2024-08-15 | End: 2024-08-16

## 2024-08-15 RX ORDER — HYDROCHLOROTHIAZIDE 12.5 MG/1
12.5 TABLET ORAL DAILY
Status: DISCONTINUED | OUTPATIENT
Start: 2024-08-16 | End: 2024-08-15

## 2024-08-15 RX ORDER — LOSARTAN POTASSIUM 50 MG/1
50 TABLET ORAL DAILY
Status: DISCONTINUED | OUTPATIENT
Start: 2024-08-16 | End: 2024-08-15

## 2024-08-15 RX ORDER — ACETAMINOPHEN 325 MG/1
650 TABLET ORAL EVERY 6 HOURS PRN
Status: DISCONTINUED | OUTPATIENT
Start: 2024-08-15 | End: 2024-08-19 | Stop reason: HOSPADM

## 2024-08-15 RX ORDER — CEFTRIAXONE 1 G/50ML
1000 INJECTION, SOLUTION INTRAVENOUS ONCE
Status: COMPLETED | OUTPATIENT
Start: 2024-08-15 | End: 2024-08-15

## 2024-08-15 RX ORDER — DOCUSATE SODIUM 100 MG/1
100 CAPSULE, LIQUID FILLED ORAL 2 TIMES DAILY
Status: DISCONTINUED | OUTPATIENT
Start: 2024-08-15 | End: 2024-08-19 | Stop reason: HOSPADM

## 2024-08-15 RX ORDER — SULFAMETHOXAZOLE/TRIMETHOPRIM 800-160 MG
1 TABLET ORAL EVERY 12 HOURS SCHEDULED
Qty: 20 TABLET | Refills: 0 | Status: SHIPPED | OUTPATIENT
Start: 2024-08-15 | End: 2024-08-15

## 2024-08-15 RX ORDER — SULFAMETHOXAZOLE/TRIMETHOPRIM 800-160 MG
1 TABLET ORAL EVERY 12 HOURS SCHEDULED
Qty: 20 TABLET | Refills: 0 | Status: SHIPPED | OUTPATIENT
Start: 2024-08-15 | End: 2024-08-19

## 2024-08-15 RX ORDER — SODIUM CHLORIDE, SODIUM GLUCONATE, SODIUM ACETATE, POTASSIUM CHLORIDE, MAGNESIUM CHLORIDE, SODIUM PHOSPHATE, DIBASIC, AND POTASSIUM PHOSPHATE .53; .5; .37; .037; .03; .012; .00082 G/100ML; G/100ML; G/100ML; G/100ML; G/100ML; G/100ML; G/100ML
125 INJECTION, SOLUTION INTRAVENOUS CONTINUOUS
Status: DISCONTINUED | OUTPATIENT
Start: 2024-08-15 | End: 2024-08-15

## 2024-08-15 RX ORDER — VENLAFAXINE HYDROCHLORIDE 37.5 MG/1
37.5 CAPSULE, EXTENDED RELEASE ORAL DAILY
Status: DISCONTINUED | OUTPATIENT
Start: 2024-08-16 | End: 2024-08-19 | Stop reason: HOSPADM

## 2024-08-15 RX ORDER — CHLORAL HYDRATE 500 MG
1000 CAPSULE ORAL DAILY
Status: DISCONTINUED | OUTPATIENT
Start: 2024-08-16 | End: 2024-08-19 | Stop reason: HOSPADM

## 2024-08-15 RX ORDER — SODIUM CHLORIDE, SODIUM GLUCONATE, SODIUM ACETATE, POTASSIUM CHLORIDE, MAGNESIUM CHLORIDE, SODIUM PHOSPHATE, DIBASIC, AND POTASSIUM PHOSPHATE .53; .5; .37; .037; .03; .012; .00082 G/100ML; G/100ML; G/100ML; G/100ML; G/100ML; G/100ML; G/100ML
100 INJECTION, SOLUTION INTRAVENOUS CONTINUOUS
Status: DISCONTINUED | OUTPATIENT
Start: 2024-08-15 | End: 2024-08-18

## 2024-08-15 RX ORDER — ENOXAPARIN SODIUM 100 MG/ML
40 INJECTION SUBCUTANEOUS DAILY
Status: DISCONTINUED | OUTPATIENT
Start: 2024-08-16 | End: 2024-08-15

## 2024-08-15 RX ORDER — NAPROXEN 500 MG/1
500 TABLET ORAL 2 TIMES DAILY WITH MEALS
Qty: 20 TABLET | Refills: 0 | Status: SHIPPED | OUTPATIENT
Start: 2024-08-15 | End: 2024-08-15

## 2024-08-15 RX ORDER — PRAVASTATIN SODIUM 20 MG
20 TABLET ORAL DAILY
Status: DISCONTINUED | OUTPATIENT
Start: 2024-08-16 | End: 2024-08-19 | Stop reason: HOSPADM

## 2024-08-15 RX ORDER — LIDOCAINE HYDROCHLORIDE AND EPINEPHRINE 10; 10 MG/ML; UG/ML
10 INJECTION, SOLUTION INFILTRATION; PERINEURAL ONCE
Status: COMPLETED | OUTPATIENT
Start: 2024-08-15 | End: 2024-08-15

## 2024-08-15 RX ORDER — LEVOTHYROXINE SODIUM 112 UG/1
112 TABLET ORAL
Status: DISCONTINUED | OUTPATIENT
Start: 2024-08-16 | End: 2024-08-19 | Stop reason: HOSPADM

## 2024-08-15 RX ORDER — HYDROCODONE BITARTRATE AND ACETAMINOPHEN 5; 325 MG/1; MG/1
1 TABLET ORAL EVERY 6 HOURS PRN
Status: DISCONTINUED | OUTPATIENT
Start: 2024-08-15 | End: 2024-08-19 | Stop reason: HOSPADM

## 2024-08-15 RX ORDER — PANTOPRAZOLE SODIUM 40 MG/1
40 TABLET, DELAYED RELEASE ORAL
Status: DISCONTINUED | OUTPATIENT
Start: 2024-08-16 | End: 2024-08-19 | Stop reason: HOSPADM

## 2024-08-15 RX ORDER — NAPROXEN 500 MG/1
500 TABLET ORAL 2 TIMES DAILY WITH MEALS
Qty: 20 TABLET | Refills: 0 | Status: SHIPPED | OUTPATIENT
Start: 2024-08-15 | End: 2024-08-19

## 2024-08-15 RX ORDER — ONDANSETRON 2 MG/ML
4 INJECTION INTRAMUSCULAR; INTRAVENOUS EVERY 6 HOURS PRN
Status: DISCONTINUED | OUTPATIENT
Start: 2024-08-15 | End: 2024-08-19 | Stop reason: HOSPADM

## 2024-08-15 RX ADMIN — CEFTRIAXONE 1000 MG: 1 INJECTION, SOLUTION INTRAVENOUS at 17:55

## 2024-08-15 RX ADMIN — VANCOMYCIN HYDROCHLORIDE 1000 MG: 1 INJECTION, SOLUTION INTRAVENOUS at 18:15

## 2024-08-15 RX ADMIN — DOCUSATE SODIUM 100 MG: 100 CAPSULE, LIQUID FILLED ORAL at 18:28

## 2024-08-15 RX ADMIN — LIDOCAINE HYDROCHLORIDE,EPINEPHRINE BITARTRATE 10 ML: 10; .01 INJECTION, SOLUTION INFILTRATION; PERINEURAL at 15:49

## 2024-08-15 RX ADMIN — HYDROCODONE BITARTRATE AND ACETAMINOPHEN 1 TABLET: 5; 325 TABLET ORAL at 20:20

## 2024-08-15 RX ADMIN — SODIUM CHLORIDE, SODIUM GLUCONATE, SODIUM ACETATE, POTASSIUM CHLORIDE, MAGNESIUM CHLORIDE, SODIUM PHOSPHATE, DIBASIC, AND POTASSIUM PHOSPHATE 100 ML/HR: .53; .5; .37; .037; .03; .012; .00082 INJECTION, SOLUTION INTRAVENOUS at 18:38

## 2024-08-15 NOTE — ED PROVIDER NOTES
History  Chief Complaint   Patient presents with    Knee Pain     Right knee pain for 3 days, redness and swelling noted. Was at urgent care and concerned for infection, sent in by ortho for rule of septic joint.      59-year-old female presents emergency room complaining of right knee pain.  The patient notes that symptoms have been present for about 3 days.  Patient denies any fever or chills but admits to increasing pain without any definitive trauma.  The patient has swelling to the knee and went to urgent care who did an x-ray and was concerned for possible infection so the patient was referred to Ortho who originally was supposed to see them around 3:00 today however they called her and told her to go to the ER and canceled the appointment.        Prior to Admission Medications   Prescriptions Last Dose Informant Patient Reported? Taking?   Multiple Vitamin (MULTI VITAMIN DAILY PO)  Self Yes No   Sig: Take by mouth daily   Omega-3 Fatty Acids (fish oil) 1,000 mg  Self Yes No   Sig: Take 1,000 mg by mouth daily   Synthroid 112 MCG tablet   No No   Sig: Take 1 tablet (112 mcg) Monday-Friday. No dose on Saturday or .   Upadacitinib ER (Rinvoq) 15 MG TB24  Self Yes No   Sig: Take 15 mg by mouth in the morning   naproxen (Naprosyn) 500 mg tablet   No No   Sig: Take 1 tablet (500 mg total) by mouth 2 (two) times a day with meals for 10 days   omeprazole (PriLOSEC) 20 mg delayed release capsule  Self Yes No   Si mg    pravastatin (PRAVACHOL) 20 mg tablet  Self Yes No   Sig: Take 20 mg by mouth daily   sucralfate (CARAFATE) 1 g tablet  Self Yes No   Si (two) times a day as needed   Patient not taking: Reported on 2024   sulfamethoxazole-trimethoprim (BACTRIM DS) 800-160 mg per tablet   No No   Sig: Take 1 tablet by mouth every 12 (twelve) hours for 10 days   valsartan-hydrochlorothiazide (DIOVAN-HCT) 80-12.5 MG per tablet  Self Yes No   venlafaxine (EFFEXOR-XR) 37.5 mg 24 hr capsule  Self Yes No    Sig: Take 37.5 mg by mouth daily Take with food      Facility-Administered Medications: None       Past Medical History:   Diagnosis Date    Cancer (HCC)     thyroid,vulvar    Chronic pain disorder     Crohn's disease (HCC)     Disease of thyroid gland     GERD (gastroesophageal reflux disease)     Hiatal hernia     Hyperlipidemia        Past Surgical History:   Procedure Laterality Date    CERVICAL FUSION      C 6-7    COLONOSCOPY  2004    ,,,,    EGD      EGD      HYSTERECTOMY      NECK SURGERY       plates and screws in neck bewtween 6-7    OH INSJ TUNNELED CTR VAD W/SUBQ PORT AGE 5 YR/> Left 10/10/2019    Procedure: INSERTION VENOUS PORT (PORT-A-CATH);  Surgeon: Juan Johns MD;  Location:  MAIN OR;  Service: General    OH LAPS SURG CHOLECYSTECTOMY W/CHOLANGIOGRAPHY N/A 2018    Procedure: LAPAROSCOPIC CHOLECYSTECTOMY WITH CHOLANGIOGRAM;  Surgeon: Juan Johns MD;  Location:  MAIN OR;  Service: General    SIMPLE VULVECTOMY  2014    THYROIDECTOMY         Family History   Problem Relation Age of Onset    Crohn's disease Mother     Osteoporosis Mother             Heart disease Father     Diabetes type II Father         Is under control    Hypertension Father     No Known Problems Sister     No Known Problems Maternal Grandmother     No Known Problems Paternal Grandmother     Hypertension Brother     Thyroid cancer Family         Me,removed     I have reviewed and agree with the history as documented.    E-Cigarette/Vaping    E-Cigarette Use Never User      E-Cigarette/Vaping Substances    Nicotine No     THC No     CBD No     Flavoring No     Other No     Unknown No      Social History     Tobacco Use    Smoking status: Former     Current packs/day: 0.00     Average packs/day: 0.5 packs/day for 15.0 years (7.5 ttl pk-yrs)     Types: Cigarettes     Start date: 1992     Quit date: 2007     Years since quittin.6    Smokeless tobacco:  Never   Vaping Use    Vaping status: Never Used   Substance Use Topics    Alcohol use: Yes     Comment: occ wine    Drug use: Yes     Types: Marijuana     Comment: Medical marijuana,crohns       Review of Systems   Constitutional:  Positive for activity change. Negative for chills and fever.   HENT:  Negative for ear pain and sore throat.    Eyes:  Negative for pain and visual disturbance.   Respiratory:  Negative for cough and shortness of breath.    Cardiovascular:  Negative for chest pain.   Gastrointestinal:  Negative for abdominal pain and vomiting.   Musculoskeletal:  Positive for arthralgias and joint swelling. Negative for back pain.   Skin:  Negative for color change and rash.   All other systems reviewed and are negative.      Physical Exam  Physical Exam  Vitals and nursing note reviewed.   Constitutional:       General: She is not in acute distress.     Appearance: She is well-developed.   HENT:      Head: Normocephalic and atraumatic.   Eyes:      Conjunctiva/sclera: Conjunctivae normal.   Cardiovascular:      Rate and Rhythm: Normal rate and regular rhythm.      Heart sounds: No murmur heard.  Pulmonary:      Effort: Pulmonary effort is normal. No respiratory distress.      Breath sounds: Normal breath sounds.   Abdominal:      Palpations: Abdomen is soft.      Tenderness: There is no abdominal tenderness.   Musculoskeletal:         General: Swelling and tenderness present. No deformity.      Cervical back: Neck supple.      Comments: Examination of the knee shows that the tissues around the patella are soft and feel more spongy than like a definitive effusion.  There may be an effusion inferior to this.  Patient's knee is not red but is warm.  Increasing pain with patellar blotting or weightbearing.  No anterior posterior drawer.   Skin:     General: Skin is warm and dry.      Capillary Refill: Capillary refill takes less than 2 seconds.   Neurological:      Mental Status: She is alert.   Psychiatric:          Mood and Affect: Mood normal.         Vital Signs  ED Triage Vitals [08/15/24 1324]   Temperature Pulse Respirations Blood Pressure SpO2   98.1 °F (36.7 °C) 90 17 112/74 96 %      Temp Source Heart Rate Source Patient Position - Orthostatic VS BP Location FiO2 (%)   Oral Monitor Sitting Left arm --      Pain Score       --           Vitals:    08/15/24 1324   BP: 112/74   Pulse: 90   Patient Position - Orthostatic VS: Sitting         Visual Acuity      ED Medications  Medications   vancomycin (VANCOCIN) IVPB (premix in dextrose) 1,000 mg 200 mL (has no administration in time range)   cefTRIAXone (ROCEPHIN) IVPB (premix in dextrose) 1,000 mg 50 mL (1,000 mg Intravenous New Bag 8/15/24 1755)   lidocaine-epinephrine (XYLOCAINE/EPINEPHRINE) 1 %-1:100,000 injection 10 mL (10 mL Infiltration Given 8/15/24 1549)       Diagnostic Studies  Results Reviewed       Procedure Component Value Units Date/Time    Synovial Fluid Diff [[id number]] Collected: 08/15/24 1625    Lab Status: Final result Specimen: Synovial Fluid from Joint, Right Knee Updated: 08/15/24 1749     Total Counted 100     Neutrophil % Synovial 94 %      Histiocyte % Synovial 6 %     Synovial fluid white cell count w/ diff [[id number]]  (Abnormal) Collected: 08/15/24 1625    Lab Status: Final result Specimen: Synovial Fluid from Joint, Right Knee Updated: 08/15/24 1715     Site right knee joint     Color, Fluid Light Orange     Clarity, Fluid Turbid     WBC, Fluid 140,118 /ul     Synovial fluid, crystal [[id number]] Collected: 08/15/24 1625    Lab Status: In process Specimen: Synovial Fluid from Joint, Right Knee Updated: 08/15/24 1630    Body fluid culture and Gram stain [[id number]] Collected: 08/15/24 1625    Lab Status: In process Specimen: Body Fluid from Joint, Right Knee Updated: 08/15/24 1630    C-reactive protein [[id number]]  (Abnormal) Collected: 08/15/24 1409    Lab Status: Final result Specimen: Blood from Arm, Right Updated: 08/15/24 9910      .8 mg/L     Comprehensive metabolic panel [[id number]]  (Abnormal) Collected: 08/15/24 1409    Lab Status: Final result Specimen: Blood from Arm, Right Updated: 08/15/24 1429     Sodium 132 mmol/L      Potassium 3.5 mmol/L      Chloride 91 mmol/L      CO2 31 mmol/L      ANION GAP 10 mmol/L      BUN 9 mg/dL      Creatinine 0.63 mg/dL      Glucose 166 mg/dL      Calcium 9.6 mg/dL      AST 33 U/L      ALT 31 U/L      Alkaline Phosphatase 75 U/L      Total Protein 7.9 g/dL      Albumin 4.2 g/dL      Total Bilirubin 0.60 mg/dL      eGFR 98 ml/min/1.73sq m     Narrative:      National Kid[employer] Foundation guidelines for Chronic Kidney Disease (CKD):     Stage 1 with normal or high GFR (GFR > 90 mL/min/1.73 square meters)    Stage 2 Mild CKD (GFR = 60-89 mL/min/1.73 square meters)    Stage 3A Moderate CKD (GFR = 45-59 mL/min/1.73 square meters)    Stage 3B Moderate CKD (GFR = 30-44 mL/min/1.73 square meters)    Stage 4 Severe CKD (GFR = 15-29 mL/min/1.73 square meters)    Stage 5 End Stage CKD (GFR <15 mL/min/1.73 square meters)  Note: GFR calculation is accurate only with a steady state creatinine    Sedimentation rate, automated [[id number]]  (Abnormal) Collected: 08/15/24 1409    Lab Status: Final result Specimen: Blood from Arm, Right Updated: 08/15/24 1424     Sed Rate 66 mm/hour     CBC and differential [[id number]]  (Abnormal) Collected: 08/15/24 1409    Lab Status: Final result Specimen: Blood from Arm, Right Updated: 08/15/24 1415     WBC 11.44 Thousand/uL      RBC 3.93 Million/uL      Hemoglobin 12.4 g/dL      Hematocrit 37.0 %      MCV 94 fL      MCH 31.6 pg      MCHC 33.5 g/dL      RDW 12.9 %      MPV 10.1 fL      Platelets 266 Thousands/uL      nRBC 0 /100 WBCs      Segmented % 75 %      Immature Grans % 0 %      Lymphocytes % 11 %      Monocytes % 14 %      Eosinophils Relative 0 %      Basophils Relative 0 %      Absolute Neutrophils 8.51 Thousands/µL      Absolute Immature Grans 0.05 Thousand/uL       Absolute Lymphocytes 1.22 Thousands/µL      Absolute Monocytes 1.63 Thousand/µL      Eosinophils Absolute 0.00 Thousand/µL      Basophils Absolute 0.03 Thousands/µL                    CT lower extremity wo contrast right   Final Result by Boogie Daniels MD (08/15 1512)      Mild subcutaneous edema and fat stranding in the soft tissues anterior to the knee, nonspecific. No discrete drainable fluid collection or subcutaneous gas to suggest abscess.      Large joint effusion. If there is clinical concern for septic arthritis, aspiration is recommended.      No CT evidence of osteomyelitis.         Resident: HENNY MIR I, the attending radiologist, have reviewed the images and agree with the final report above.      Workstation performed: [id number]                    Procedures  Incision and drain    Date/Time: 8/15/2024 4:08 PM    Performed by: Suhas Cleveland Jr., DO  Authorized by: Suhas Cleveland Jr., DO  Universal Protocol:  Consent: Verbal consent obtained.  Consent given by: patient  Timeout called at: 8/15/2024 3:08 PM.  Patient understanding: patient states understanding of the procedure being performed  Patient consent: the patient's understanding of the procedure matches consent given    Patient location:  ED  Location:     Type:  Fluid collection    Size:  R knee large    Location:  Lower extremity    Lower extremity location: R Knee arthrocenthesis.  Pre-procedure details:     Skin preparation:  Antiseptic wash and Chloraprep  Procedure details:     Complexity:  Simple    Needle aspiration: yes      Needle size:  18 G    Approach:  Puncture    Drainage:  Purulent    Drainage amount:  Moderate    Packing materials:  None  Post-procedure details:     Patient tolerance of procedure:  Tolerated well, no immediate complications           ED Course  ED Course as of 08/15/24 1802   u Aug 15, 2024   1520 IMPRESSION:     Mild subcutaneous edema and fat stranding in the soft tissues  anterior to the knee, nonspecific. No discrete drainable fluid collection or subcutaneous gas to suggest abscess.     Large joint effusion. If there is clinical concern for septic arthritis, aspiration is recommended.     1620 Patient with purulent material aspirated from the right knee.  Tests pending   1632 Case discussed with Dr. Christianson who recommends that the patient get a cell count before determining admission in OR status.   1738 WBC, Fluid(!): 140,118                                 SBIRT 20yo+      Flowsheet Row Most Recent Value   Initial Alcohol Screen: US AUDIT-C     1. How often do you have a drink containing alcohol? 0 Filed at: 08/15/2024 1326   2. How many drinks containing alcohol do you have on a typical day you are drinking?  0 Filed at: 08/15/2024 1326   3b. FEMALE Any Age, or MALE 65+: How often do you have 4 or more drinks on one occassion? 0 Filed at: 08/15/2024 1326   Audit-C Score 0 Filed at: 08/15/2024 1326   DIXON: How many times in the past year have you...    Used an illegal drug or used a prescription medication for non-medical reasons? Never Filed at: 08/15/2024 1326                      Medical Decision Making  59-year-old female presents emergency room complaining of increasing pain to the right knee for the last 3 days.  Patient notes she has had more mild pain prior to that and denies any actual injury or rash.  The patient denies fever or chills.  Patient does have a history of Crohn's disease with immunocompromise state due to oral medication for immune suppression. Patient went to the urgent care for evaluation and had an x-ray which look nonacute.  There was concern for possible septic joint so the patient was sent to the ER after originally being forwarded to orthopedics.  Orthopedics called and sent the patient to the ER to have a knee arthrocentesis.  Lab testing showed elevated CRP as well as a CBC and sed rate.  Patient had an joint aspiration done in the ED which showed  over 240,000 whites.  Case discussed with Dr. Christianson who recommends that the patient be admitted for IV antibiotics with OR in the morning.    Amount and/or Complexity of Data Reviewed  Labs: ordered. Decision-making details documented in ED Course.  Radiology: ordered.    Risk  Prescription drug management.  Decision regarding hospitalization.                 Disposition  Final diagnoses:   Septic arthritis (HCC)     Time reflects when diagnosis was documented in both MDM as applicable and the Disposition within this note       Time User Action Codes Description Comment    8/15/2024  5:58 PM Suhas Cleveland Add [M00.9] Septic arthritis (HCC)           ED Disposition       ED Disposition   Admit    Condition   Stable    Date/Time   Thu Aug 15, 2024 8977    Comment   Case was discussed with Dr. Bazan and the patient's admission status was agreed to be Admission Status: inpatient status to the service of Dr. Bazan .               Follow-up Information    None         Patient's Medications   Discharge Prescriptions    No medications on file       No discharge procedures on file.    PDMP Review       None            ED Provider  Electronically Signed by             Suhas Cleveland Jr.,   08/15/24 5599

## 2024-08-15 NOTE — PATIENT INSTRUCTIONS
Xray initial interpretation: right knee - no acute osseous abnormality  Official radiology read pending - We will only notify you if there needs to be a change in your treatment plan.     If pain and swelling do not improve on antibiotics please proceed to ED - you may need to have joint drained.     Take full course of bactrim as prescribed  Eat yogurt with live and active cultures and/or take a probiotic at least 3 hours before or after antibiotic dose. Monitor stool for diarrhea and/or blood. If this occurs, contact primary care doctor ASAP.     Take Naprosyn as prescribed   Do not take Naprosyn with other NSAIDs - ok to take tylenol    Rest (for no longer than 24 hours)  Follow up with orthopedic - referral placed today      Follow up with PCP in 3-5 days.  Proceed to  ER if symptoms worsen.    If tests are performed, our office will contact you with results only if changes need to made to the care plan discussed with you at the visit. You can review your full results on [facility] Mychart.    Patient Education     Swollen Joints   About this topic   Joints swell when you have too much fluid in them. This is also known as an effusion. Normally, you have a small amount of fluid in your joints to make it easier to move.  What are the causes?   You can build up extra fluid in your joint from:  Using it too much  Arthritis or gout  A broken bone, dislocation, sprain, strain, or torn cartilage  An infection  A cyst or tumor  Bleeding into the joint from an injury  What can make this more likely to happen?   You are more likely to have extra fluid in a joint as you get older. Some hobbies or jobs can put you at a higher risk for this problem if you strain or twist your joints. Health problems like arthritis can also increase your risk for having extra fluid in your joints.  What are the main signs?   Problems moving your joint  Pain when you move the joint  Your joint is red, swollen, and warm to the touch  How does  the doctor diagnose this health problem?   The doctor will ask you questions about your health history and do an exam. The doctor will compare the joint to others on your body and check how well the joint moves. The doctor may order:  X-rays  Ultrasounds  MRI  CT scan  Lab tests  How does the doctor treat this health problem?   The doctor may put a needle into the joint and pull out some of the fluid. This may help with pain and swelling. The doctor may send the fluid for tests to learn more about why you are having problems.  What drugs may be needed?   The doctor may order drugs to:  Help with pain and swelling  Fight an infection  The doctor may give you a shot of an anti-inflammatory drug called a corticosteroid. This will help with the pain and swelling.  Last Reviewed Date   2020-11-02  Consumer Information Use and Disclaimer   This generalized information is a limited summary of diagnosis, treatment, and/or medication information. It is not meant to be comprehensive and should be used as a tool to help the user understand and/or assess potential diagnostic and treatment options. It does NOT include all information about conditions, treatments, medications, side effects, or risks that may apply to a specific patient. It is not intended to be medical advice or a substitute for the medical advice, diagnosis, or treatment of a health care provider based on the health care provider's examination and assessment of a patient’s specific and unique circumstances. Patients must speak with a health care provider for complete information about their health, medical questions, and treatment options, including any risks or benefits regarding use of medications. This information does not endorse any treatments or medications as safe, effective, or approved for treating a specific patient. [employer] and its affiliates disclaim any warranty or liability relating to this information or the use thereof. The use of this  information is governed by the Terms of Use, available at https://www.wolterskluwer.com/en/know/clinical-effectiveness-terms   Copyright   Copyright © 2024 [employer] and its affiliates and/or licensors. All rights reserved.

## 2024-08-15 NOTE — ASSESSMENT & PLAN NOTE
The patient presented with acute right knee pain for 3 days prior to admission with erythema and swelling  ED discussed case with orthopedic surgery  Right knee was aspirated in the ED-noted purulent material  Consult orthopedics  Started on ceftriaxone and vancomycin with pharmacy for dosing  Pain management  PT/OT once cleared by orthopedics  Follow-up with cultures

## 2024-08-15 NOTE — ASSESSMENT & PLAN NOTE
BP is somewhat soft  Will hold off on valsartan and hydrochlorothiazide  Continue with IV fluids  Monitor blood pressure closely and adjust medication as indicated

## 2024-08-15 NOTE — H&P
[facility]  H&P  Name: Jessica Larson 59 y.o. female I MRN: [id number]  Unit/Bed#: ED 26 I Date of Admission: 8/15/2024   Date of Service: 8/15/2024 I Hospital Day: 0      Assessment & Plan   * Acute pain of right knee  Assessment & Plan  The patient presented with acute right knee pain for 3 days prior to admission with erythema and swelling  ED discussed case with orthopedic surgery  Right knee was aspirated in the ED-noted purulent material  Consult orthopedics  Started on ceftriaxone and vancomycin with pharmacy for dosing  Pain management  PT/OT once cleared by orthopedics  Follow-up with cultures    Primary hypertension  Assessment & Plan  BP is somewhat soft  Will hold off on valsartan and hydrochlorothiazide  Continue with IV fluids  Monitor blood pressure closely and adjust medication as indicated    Postoperative hypothyroidism  Assessment & Plan  Continue with levothyroxine    Gastroesophageal reflux disease without esophagitis  Assessment & Plan  Continue PPI    Crohn's disease (HCC)  Assessment & Plan  On upadacitinib 15 mg daily. Will hold with acute infection.   Continue to follow up outpatient            VTE Pharmacologic Prophylaxis:   Moderate Risk (Score 3-4) - Pharmacological DVT Prophylaxis Contraindicated. Sequential Compression Devices Ordered.  Code Status: Level 1 - Full Code   Discussion with family: Patient declined call to .     Anticipated Length of Stay: Patient will be admitted on an inpatient basis with an anticipated length of stay of greater than 2 midnights secondary to suspected septic arthritis.    Total Time Spent on Date of Encounter in care of patient: 70 mins. This time was spent on one or more of the following: performing physical exam; counseling and coordination of care; obtaining or reviewing history; documenting in the medical record; reviewing/ordering tests, medications or procedures; communicating with other healthcare  professionals and discussing with patient's family/caregivers.    Chief Complaint: Knee swelling/pain    History of Present Illness:  Jessica Larson is a 59 y.o. female with a PMH of ulcerative colitis, GERD, hypothyroidism who presents with right knee swelling/pain.  Patient states that symptoms started 3 to 4 days ago.  Started with pain and then noticed swelling.  Patient denies any trauma however states that she does work in the garden a lot and is on her knees a lot.  Denies any fever or chills.    Review of Systems:  Review of Systems   Musculoskeletal:  Positive for arthralgias, gait problem and joint swelling.   All other systems reviewed and are negative.      Past Medical and Surgical History:   Past Medical History:   Diagnosis Date    Cancer (HCC)     thyroid,vulvar    Chronic pain disorder     Crohn's disease (HCC)     Disease of thyroid gland     GERD (gastroesophageal reflux disease)     Hiatal hernia     Hyperlipidemia        Past Surgical History:   Procedure Laterality Date    CERVICAL FUSION      C 6-7    COLONOSCOPY  2004 2006,2012,2014,2016,2018    EGD  2012    EGD  2016    HYSTERECTOMY  2013    NECK SURGERY  2011     plates and screws in neck bewtween 6-7    CA INSJ TUNNELED CTR VAD W/SUBQ PORT AGE 5 YR/> Left 10/10/2019    Procedure: INSERTION VENOUS PORT (PORT-A-CATH);  Surgeon: Juan Johns MD;  Location:  MAIN OR;  Service: General    CA LAPS SURG CHOLECYSTECTOMY W/CHOLANGIOGRAPHY N/A 9/27/2018    Procedure: LAPAROSCOPIC CHOLECYSTECTOMY WITH CHOLANGIOGRAM;  Surgeon: Juan Johns MD;  Location:  MAIN OR;  Service: General    SIMPLE VULVECTOMY  12/24/2014    THYROIDECTOMY         Meds/Allergies:  Prior to Admission medications    Medication Sig Start Date End Date Taking? Authorizing Provider   Multiple Vitamin (MULTI VITAMIN DAILY PO) Take by mouth daily    Historical Provider, MD   naproxen (Naprosyn) 500 mg tablet Take 1 tablet (500 mg total) by mouth 2 (two) times a day  with meals for 10 days 8/15/24 8/25/24  NICOLAS Lemon   Omega-3 Fatty Acids (fish oil) 1,000 mg Take 1,000 mg by mouth daily    Historical Provider, MD   omeprazole (PriLOSEC) 20 mg delayed release capsule 40 mg     Historical Provider, MD   pravastatin (PRAVACHOL) 20 mg tablet Take 20 mg by mouth daily 3/12/12   Historical Provider, MD   sucralfate (CARAFATE) 1 g tablet 2 (two) times a day as needed  Patient not taking: Reported on 4/9/2024 5/23/21   Historical Provider, MD   sulfamethoxazole-trimethoprim (BACTRIM DS) 800-160 mg per tablet Take 1 tablet by mouth every 12 (twelve) hours for 10 days 8/15/24 8/25/24  NICOLAS Lemon   Synthroid 112 MCG tablet Take 1 tablet (112 mcg) Monday-Friday. No dose on Saturday or Sunday. 7/15/24   NICOLAS Burt   Upadacitinib ER (Rinvoq) 15 MG TB24 Take 15 mg by mouth in the morning    Historical Provider, MD   valsartan-hydrochlorothiazide (DIOVAN-HCT) 80-12.5 MG per tablet  7/22/21   Historical Provider, MD   venlafaxine (EFFEXOR-XR) 37.5 mg 24 hr capsule Take 37.5 mg by mouth daily Take with food 9/7/23   Historical Provider, MD   naproxen (Naprosyn) 500 mg tablet Take 1 tablet (500 mg total) by mouth 2 (two) times a day with meals for 10 days 8/15/24 8/15/24  NICOLAS Lemon   sulfamethoxazole-trimethoprim (BACTRIM DS) 800-160 mg per tablet Take 1 tablet by mouth every 12 (twelve) hours for 10 days 8/15/24 8/15/24  NICOLAS Lemon     I have reviewed home medications with patient personally.    Allergies:   Allergies   Allergen Reactions    Mercaptopurine Rash     Cancer occurrence--this medication is for Chrohn's disease    Penicillins Lightheadedness    Skyrizi [Risankizumab] Dizziness and Lightheadedness    Humira [Adalimumab] Rash    Remicade [Infliximab] Rash       Social History:  Marital Status: /Civil Union   Occupation: none  Patient Pre-hospital Living Situation: Home  Patient Pre-hospital  Level of Mobility: walks  Patient Pre-hospital Diet Restrictions: none  Substance Use History:   Social History     Substance and Sexual Activity   Alcohol Use Yes    Comment: occ wine     Social History     Tobacco Use   Smoking Status Former    Current packs/day: 0.00    Average packs/day: 0.5 packs/day for 15.0 years (7.5 ttl pk-yrs)    Types: Cigarettes    Start date: 1992    Quit date: 2007    Years since quittin.6   Smokeless Tobacco Never     Social History     Substance and Sexual Activity   Drug Use Yes    Types: Marijuana    Comment: Medical marijuana,crohns       Family History:  Family History   Problem Relation Age of Onset    Crohn's disease Mother     Osteoporosis Mother             Heart disease Father     Diabetes type II Father         Is under control    Hypertension Father     No Known Problems Sister     No Known Problems Maternal Grandmother     No Known Problems Paternal Grandmother     Hypertension Brother     Thyroid cancer Family         Me,removed       Physical Exam:     Vitals:   Blood Pressure: 112/74 (08/15/24 1324)  Pulse: 90 (08/15/24 1324)  Temperature: 98.1 °F (36.7 °C) (08/15/24 1324)  Temp Source: Oral (08/15/24 1324)  Respirations: 17 (08/15/24 1324)  Weight - Scale: 45.8 kg (101 lb) (08/15/24 1324)  SpO2: 96 % (08/15/24 1324)    Physical Exam  Constitutional:       General: She is not in acute distress.  HENT:      Head: Normocephalic and atraumatic.      Nose: Nose normal.      Mouth/Throat:      Mouth: Mucous membranes are moist.   Eyes:      Extraocular Movements: Extraocular movements intact.      Conjunctiva/sclera: Conjunctivae normal.   Cardiovascular:      Rate and Rhythm: Normal rate and regular rhythm.   Pulmonary:      Effort: Pulmonary effort is normal. No respiratory distress.   Abdominal:      Palpations: Abdomen is soft.      Tenderness: There is no abdominal tenderness.   Musculoskeletal:         General: Normal range of motion.       Cervical back: Normal range of motion and neck supple.   Skin:     General: Skin is warm and dry.   Neurological:      General: No focal deficit present.      Mental Status: She is alert. Mental status is at baseline.      Cranial Nerves: No cranial nerve deficit.   Psychiatric:         Mood and Affect: Mood normal.         Behavior: Behavior normal.          Additional Data:     Lab Results:  Results from last 7 days   Lab Units 08/15/24  1409   WBC Thousand/uL 11.44*   HEMOGLOBIN g/dL 12.4   HEMATOCRIT % 37.0   PLATELETS Thousands/uL 266   SEGS PCT % 75   LYMPHO PCT % 11*   MONO PCT % 14*   EOS PCT % 0     Results from last 7 days   Lab Units 08/15/24  1409   SODIUM mmol/L 132*   POTASSIUM mmol/L 3.5   CHLORIDE mmol/L 91*   CO2 mmol/L 31   BUN mg/dL 9   CREATININE mg/dL 0.63   ANION GAP mmol/L 10   CALCIUM mg/dL 9.6   ALBUMIN g/dL 4.2   TOTAL BILIRUBIN mg/dL 0.60   ALK PHOS U/L 75   ALT U/L 31   AST U/L 33   GLUCOSE RANDOM mg/dL 166*             Lab Results   Component Value Date    HGBA1C 5.4 05/17/2024    HGBA1C 5.5 03/09/2023    HGBA1C 5.7 (H) 08/22/2022           Lines/Drains:  Invasive Devices       Central Venous Catheter Line  Duration             Port A Cath 10/01/19 Left Chest 1780 days              Peripheral Intravenous Line  Duration             Peripheral IV 08/15/24 Left Antecubital <1 day    Peripheral IV 08/15/24 Right Antecubital <1 day                    Central Line:  Goal for removal:  Chronic port for ulcerative colitis           Imaging: Reviewed radiology reports from this admission including: CT lower extremity  CT lower extremity wo contrast right   Final Result by Boogie Daniels MD (08/15 1512)      Mild subcutaneous edema and fat stranding in the soft tissues anterior to the knee, nonspecific. No discrete drainable fluid collection or subcutaneous gas to suggest abscess.      Large joint effusion. If there is clinical concern for septic arthritis, aspiration is recommended.      No  CT evidence of osteomyelitis.         Resident: HENNY MIR I, the attending radiologist, have reviewed the images and agree with the final report above.      Workstation performed: [id number]             ** Please Note: This note has been constructed using a voice recognition system. **

## 2024-08-15 NOTE — PROGRESS NOTES
[facility]        NAME: Jessica Larson is a 59 y.o. female  : 1965    MRN: [id number]  DATE: August 15, 2024  TIME: 12:23 PM    Assessment and Plan   Pain and swelling of right knee [M25.561, M25.461]  1. Pain and swelling of right knee  XR knee 4+ vw right injury    sulfamethoxazole-trimethoprim (BACTRIM DS) 800-160 mg per tablet    Ambulatory Referral to Orthopedic Surgery    naproxen (Naprosyn) 500 mg tablet        Xray initial interpretation: right knee - no acute osseous abnormality  Official radiology read pending - We will only notify you if there needs to be a change in your treatment plan.   Concerned for septic arthritis of right knee - red flags discussed of when to proceed to ED      Patient Instructions   Xray initial interpretation: right knee - no acute osseous abnormality  Official radiology read pending - We will only notify you if there needs to be a change in your treatment plan.     If pain and swelling do not improve on antibiotics please proceed to ED - you may need to have joint drained.     Take full course of bactrim as prescribed  Eat yogurt with live and active cultures and/or take a probiotic at least 3 hours before or after antibiotic dose. Monitor stool for diarrhea and/or blood. If this occurs, contact primary care doctor ASAP.     Take Naprosyn as prescribed   Do not take Naprosyn with other NSAIDs - ok to take tylenol    Rest (for no longer than 24 hours)  Follow up with orthopedic - referral placed today      Follow up with PCP in 3-5 days.  Proceed to  ER if symptoms worsen.    If tests are performed, our office will contact you with results only if changes need to made to the care plan discussed with you at the visit. [facility]    Chief Complaint     Chief Complaint   Patient presents with    Knee Pain     Right no fall pain swollen         History of Present Illness       59-year-old female arrives reporting right  knee pain and swelling increasing over the past week.  Patient denies any injury to area.  Patient reports previous history of a Baker's cyst.  Patient denies any numbness or tingling to lower extremities.  Patient's right knee is red, tender to palpation, and has swelling present.  Patient reports subjective fevers of sweats.        Review of Systems   Review of Systems   Constitutional:  Positive for diaphoresis and fever. Negative for chills and fatigue.   Respiratory:  Negative for cough and shortness of breath.    Cardiovascular:  Negative for chest pain.   Gastrointestinal:  Negative for abdominal pain.   Musculoskeletal:  Positive for arthralgias (right knee) and joint swelling (right knee).         Current Medications       Current Outpatient Medications:     naproxen (Naprosyn) 500 mg tablet, Take 1 tablet (500 mg total) by mouth 2 (two) times a day with meals for 10 days, Disp: 20 tablet, Rfl: 0    sulfamethoxazole-trimethoprim (BACTRIM DS) 800-160 mg per tablet, Take 1 tablet by mouth every 12 (twelve) hours for 10 days, Disp: 20 tablet, Rfl: 0    Multiple Vitamin (MULTI VITAMIN DAILY PO), Take by mouth daily, Disp: , Rfl:     Omega-3 Fatty Acids (fish oil) 1,000 mg, Take 1,000 mg by mouth daily, Disp: , Rfl:     omeprazole (PriLOSEC) 20 mg delayed release capsule, 40 mg , Disp: , Rfl:     pravastatin (PRAVACHOL) 20 mg tablet, Take 20 mg by mouth daily, Disp: , Rfl:     sucralfate (CARAFATE) 1 g tablet, 2 (two) times a day as needed (Patient not taking: Reported on 4/9/2024), Disp: , Rfl:     Synthroid 112 MCG tablet, Take 1 tablet (112 mcg) Monday-Friday. No dose on Saturday or Sunday., Disp: 66 tablet, Rfl: 1    Upadacitinib ER (Rinvoq) 15 MG TB24, Take 15 mg by mouth in the morning, Disp: , Rfl:     valsartan-hydrochlorothiazide (DIOVAN-HCT) 80-12.5 MG per tablet, , Disp: , Rfl:     venlafaxine (EFFEXOR-XR) 37.5 mg 24 hr capsule, Take 37.5 mg by mouth daily Take with food, Disp: , Rfl:     Current  Allergies     Allergies as of 08/15/2024 - Reviewed 08/15/2024   Allergen Reaction Noted    Mercaptopurine Rash 2018    Penicillins Lightheadedness 2018    Skyrizi [risankizumab] Dizziness and Lightheadedness 10/03/2023    Humira [adalimumab] Rash 2018    Remicade [infliximab] Rash 2018            The following portions of the patient's history were reviewed and updated as appropriate: allergies, current medications, past family history, past medical history, past social history, past surgical history and problem list.     Past Medical History:   Diagnosis Date    Cancer (HCC)     thyroid,vulvar    Chronic pain disorder     Crohn's disease (HCC)     Disease of thyroid gland     GERD (gastroesophageal reflux disease)     Hiatal hernia     Hyperlipidemia        Past Surgical History:   Procedure Laterality Date    CERVICAL FUSION      C 6-7    COLONOSCOPY  2004,,,,    EGD      EGD      HYSTERECTOMY      NECK SURGERY       plates and screws in neck bewtween 6-7    [place] INSJ TUNNELED CTR VAD W/SUBQ PORT AGE 5 YR/> Left 10/10/2019    Procedure: INSERTION VENOUS PORT (PORT-A-CATH);  Surgeon: Juan Johns MD;  Location:  MAIN OR;  Service: General    [place] LAPS SURG CHOLECYSTECTOMY W/CHOLANGIOGRAPHY N/A 2018    Procedure: LAPAROSCOPIC CHOLECYSTECTOMY WITH CHOLANGIOGRAM;  Surgeon: Juan Johns MD;  Location:  MAIN OR;  Service: General    SIMPLE VULVECTOMY  2014    THYROIDECTOMY         Family History   Problem Relation Age of Onset    Crohn's disease Mother     Osteoporosis Mother             Heart disease Father     Diabetes type II Father         Is under control    Hypertension Father     No Known Problems Sister     No Known Problems Maternal Grandmother     No Known Problems Paternal Grandmother     Hypertension Brother     Thyroid cancer Family         Me,removed         Medications have been verified.        Objective   BP  147/72   Pulse 82   Temp 98.1 °F (36.7 °C)   Resp 18   SpO2 96%        Physical Exam     Physical Exam  Vitals and nursing note reviewed.   Constitutional:       General: She is not in acute distress.     Appearance: Normal appearance. She is not ill-appearing.   HENT:      Head: Normocephalic.      Right Ear: External ear normal.      Left Ear: External ear normal.      Nose: Nose normal.   Eyes:      Pupils: Pupils are equal, round, and reactive to light.   Cardiovascular:      Rate and Rhythm: Normal rate and regular rhythm.      Pulses: Normal pulses.      Heart sounds: Normal heart sounds.   Pulmonary:      Effort: Pulmonary effort is normal. No respiratory distress.      Breath sounds: Normal breath sounds. No stridor. No wheezing, rhonchi or rales.   Chest:      Chest wall: No tenderness.   Musculoskeletal:         General: Swelling and tenderness present. No signs of injury.      Cervical back: Normal range of motion and neck supple.      Right knee: Swelling and effusion present. Decreased range of motion. Tenderness present. Normal alignment. Normal pulse.   Lymphadenopathy:      Cervical: No cervical adenopathy.   Skin:     General: Skin is warm and dry.      Capillary Refill: Capillary refill takes less than 2 seconds.   Neurological:      General: No focal deficit present.      Mental Status: She is alert and oriented to person, place, and time.   Psychiatric:         Mood and Affect: Mood normal.         Behavior: Behavior normal.

## 2024-08-15 NOTE — ED NOTES
Pt ambulated independently to restroom and back with the use of her cane.     Rain Knutson RN  08/15/24 [id number]

## 2024-08-16 ENCOUNTER — ANESTHESIA (INPATIENT)
Dept: PERIOP | Facility: HOSPITAL | Age: 59
DRG: 854 | End: 2024-08-16
Payer: MEDICARE

## 2024-08-16 ENCOUNTER — ANESTHESIA EVENT (INPATIENT)
Dept: PERIOP | Facility: HOSPITAL | Age: 59
DRG: 854 | End: 2024-08-16
Payer: MEDICARE

## 2024-08-16 PROBLEM — R11.2 PONV (POSTOPERATIVE NAUSEA AND VOMITING): Status: ACTIVE | Noted: 2024-08-16

## 2024-08-16 PROBLEM — Z98.1 S/P CERVICAL SPINAL FUSION: Status: ACTIVE | Noted: 2024-08-16

## 2024-08-16 PROBLEM — Z98.890 PONV (POSTOPERATIVE NAUSEA AND VOMITING): Status: ACTIVE | Noted: 2024-08-16

## 2024-08-16 LAB
ALBUMIN SERPL BCG-MCNC: 3.7 G/DL (ref 3.5–5)
ALP SERPL-CCNC: 64 U/L (ref 34–104)
ALT SERPL W P-5'-P-CCNC: 25 U/L (ref 7–52)
ANION GAP SERPL CALCULATED.3IONS-SCNC: 9 MMOL/L (ref 4–13)
AST SERPL W P-5'-P-CCNC: 24 U/L (ref 13–39)
ATRIAL RATE: 69 BPM
BASOPHILS # BLD AUTO: 0.04 THOUSANDS/ÂΜL (ref 0–0.1)
BASOPHILS NFR BLD AUTO: 0 % (ref 0–1)
BILIRUB SERPL-MCNC: 0.56 MG/DL (ref 0.2–1)
BUN SERPL-MCNC: 6 MG/DL (ref 5–25)
CALCIUM SERPL-MCNC: 8.7 MG/DL (ref 8.4–10.2)
CHLORIDE SERPL-SCNC: 95 MMOL/L (ref 96–108)
CO2 SERPL-SCNC: 32 MMOL/L (ref 21–32)
CREAT SERPL-MCNC: 0.49 MG/DL (ref 0.6–1.3)
EOSINOPHIL # BLD AUTO: 0.03 THOUSAND/ÂΜL (ref 0–0.61)
EOSINOPHIL NFR BLD AUTO: 0 % (ref 0–6)
ERYTHROCYTE [DISTWIDTH] IN BLOOD BY AUTOMATED COUNT: 12.7 % (ref 11.6–15.1)
GFR SERPL CREATININE-BSD FRML MDRD: 106 ML/MIN/1.73SQ M
GLUCOSE SERPL-MCNC: 109 MG/DL (ref 65–140)
HCT VFR BLD AUTO: 33.4 % (ref 34.8–46.1)
HGB BLD-MCNC: 11 G/DL (ref 11.5–15.4)
IMM GRANULOCYTES # BLD AUTO: 0.05 THOUSAND/UL (ref 0–0.2)
IMM GRANULOCYTES NFR BLD AUTO: 1 % (ref 0–2)
LYMPHOCYTES # BLD AUTO: 1.64 THOUSANDS/ÂΜL (ref 0.6–4.47)
LYMPHOCYTES NFR BLD AUTO: 15 % (ref 14–44)
MAGNESIUM SERPL-MCNC: 2.2 MG/DL (ref 1.9–2.7)
MCH RBC QN AUTO: 30.9 PG (ref 26.8–34.3)
MCHC RBC AUTO-ENTMCNC: 32.9 G/DL (ref 31.4–37.4)
MCV RBC AUTO: 94 FL (ref 82–98)
MONOCYTES # BLD AUTO: 1.53 THOUSAND/ÂΜL (ref 0.17–1.22)
MONOCYTES NFR BLD AUTO: 14 % (ref 4–12)
NEUTROPHILS # BLD AUTO: 7.47 THOUSANDS/ÂΜL (ref 1.85–7.62)
NEUTS SEG NFR BLD AUTO: 70 % (ref 43–75)
NRBC BLD AUTO-RTO: 0 /100 WBCS
P AXIS: 49 DEGREES
PHOSPHATE SERPL-MCNC: 2.6 MG/DL (ref 2.7–4.5)
PLATELET # BLD AUTO: 254 THOUSANDS/UL (ref 149–390)
PMV BLD AUTO: 10.3 FL (ref 8.9–12.7)
POTASSIUM SERPL-SCNC: 2.9 MMOL/L (ref 3.5–5.3)
PR INTERVAL: 146 MS
PROT SERPL-MCNC: 7 G/DL (ref 6.4–8.4)
QRS AXIS: 67 DEGREES
QRSD INTERVAL: 78 MS
QT INTERVAL: 416 MS
QTC INTERVAL: 445 MS
RBC # BLD AUTO: 3.56 MILLION/UL (ref 3.81–5.12)
SODIUM SERPL-SCNC: 136 MMOL/L (ref 135–147)
T WAVE AXIS: 75 DEGREES
VENTRICULAR RATE: 69 BPM
WBC # BLD AUTO: 10.76 THOUSAND/UL (ref 4.31–10.16)

## 2024-08-16 PROCEDURE — 29871 ARTHRS KNEE SURG FOR INFCTJ: CPT | Performed by: STUDENT IN AN ORGANIZED HEALTH CARE EDUCATION/TRAINING PROGRAM

## 2024-08-16 PROCEDURE — 87205 SMEAR GRAM STAIN: CPT | Performed by: STUDENT IN AN ORGANIZED HEALTH CARE EDUCATION/TRAINING PROGRAM

## 2024-08-16 PROCEDURE — 88305 TISSUE EXAM BY PATHOLOGIST: CPT | Performed by: SPECIALIST

## 2024-08-16 PROCEDURE — 93010 ELECTROCARDIOGRAM REPORT: CPT | Performed by: INTERNAL MEDICINE

## 2024-08-16 PROCEDURE — 99232 SBSQ HOSP IP/OBS MODERATE 35: CPT | Performed by: INTERNAL MEDICINE

## 2024-08-16 PROCEDURE — 0SBC4ZZ EXCISION OF RIGHT KNEE JOINT, PERCUTANEOUS ENDOSCOPIC APPROACH: ICD-10-PCS | Performed by: STUDENT IN AN ORGANIZED HEALTH CARE EDUCATION/TRAINING PROGRAM

## 2024-08-16 PROCEDURE — 83735 ASSAY OF MAGNESIUM: CPT | Performed by: HOSPITALIST

## 2024-08-16 PROCEDURE — 99223 1ST HOSP IP/OBS HIGH 75: CPT | Performed by: STUDENT IN AN ORGANIZED HEALTH CARE EDUCATION/TRAINING PROGRAM

## 2024-08-16 PROCEDURE — 93005 ELECTROCARDIOGRAM TRACING: CPT

## 2024-08-16 PROCEDURE — 87070 CULTURE OTHR SPECIMN AEROBIC: CPT | Performed by: STUDENT IN AN ORGANIZED HEALTH CARE EDUCATION/TRAINING PROGRAM

## 2024-08-16 PROCEDURE — 99024 POSTOP FOLLOW-UP VISIT: CPT | Performed by: STUDENT IN AN ORGANIZED HEALTH CARE EDUCATION/TRAINING PROGRAM

## 2024-08-16 PROCEDURE — 85025 COMPLETE CBC W/AUTO DIFF WBC: CPT | Performed by: HOSPITALIST

## 2024-08-16 PROCEDURE — 88312 SPECIAL STAINS GROUP 1: CPT | Performed by: SPECIALIST

## 2024-08-16 PROCEDURE — 87075 CULTR BACTERIA EXCEPT BLOOD: CPT | Performed by: STUDENT IN AN ORGANIZED HEALTH CARE EDUCATION/TRAINING PROGRAM

## 2024-08-16 PROCEDURE — 84100 ASSAY OF PHOSPHORUS: CPT | Performed by: HOSPITALIST

## 2024-08-16 PROCEDURE — 80053 COMPREHEN METABOLIC PANEL: CPT | Performed by: HOSPITALIST

## 2024-08-16 RX ORDER — ROCURONIUM BROMIDE 10 MG/ML
INJECTION, SOLUTION INTRAVENOUS AS NEEDED
Status: DISCONTINUED | OUTPATIENT
Start: 2024-08-16 | End: 2024-08-16

## 2024-08-16 RX ORDER — ACETAMINOPHEN 10 MG/ML
1000 INJECTION, SOLUTION INTRAVENOUS ONCE
Status: COMPLETED | OUTPATIENT
Start: 2024-08-16 | End: 2024-08-16

## 2024-08-16 RX ORDER — HYDROMORPHONE HCL IN WATER/PF 6 MG/30 ML
0.2 PATIENT CONTROLLED ANALGESIA SYRINGE INTRAVENOUS
Status: DISCONTINUED | OUTPATIENT
Start: 2024-08-16 | End: 2024-08-16 | Stop reason: HOSPADM

## 2024-08-16 RX ORDER — POTASSIUM CHLORIDE 29.8 MG/ML
40 INJECTION INTRAVENOUS ONCE
Status: DISCONTINUED | OUTPATIENT
Start: 2024-08-16 | End: 2024-08-16

## 2024-08-16 RX ORDER — MAGNESIUM HYDROXIDE 1200 MG/15ML
LIQUID ORAL AS NEEDED
Status: DISCONTINUED | OUTPATIENT
Start: 2024-08-16 | End: 2024-08-16 | Stop reason: HOSPADM

## 2024-08-16 RX ORDER — PROPOFOL 10 MG/ML
INJECTION, EMULSION INTRAVENOUS AS NEEDED
Status: DISCONTINUED | OUTPATIENT
Start: 2024-08-16 | End: 2024-08-16

## 2024-08-16 RX ORDER — MIDAZOLAM HYDROCHLORIDE 2 MG/2ML
INJECTION, SOLUTION INTRAMUSCULAR; INTRAVENOUS AS NEEDED
Status: DISCONTINUED | OUTPATIENT
Start: 2024-08-16 | End: 2024-08-16

## 2024-08-16 RX ORDER — ONDANSETRON 2 MG/ML
INJECTION INTRAMUSCULAR; INTRAVENOUS AS NEEDED
Status: DISCONTINUED | OUTPATIENT
Start: 2024-08-16 | End: 2024-08-16

## 2024-08-16 RX ORDER — FENTANYL CITRATE 50 UG/ML
INJECTION, SOLUTION INTRAMUSCULAR; INTRAVENOUS
Status: DISPENSED
Start: 2024-08-16 | End: 2024-08-17

## 2024-08-16 RX ORDER — ENOXAPARIN SODIUM 100 MG/ML
40 INJECTION SUBCUTANEOUS DAILY
Status: DISCONTINUED | OUTPATIENT
Start: 2024-08-17 | End: 2024-08-19 | Stop reason: HOSPADM

## 2024-08-16 RX ORDER — POTASSIUM CHLORIDE 14.9 MG/ML
20 INJECTION INTRAVENOUS
Status: COMPLETED | OUTPATIENT
Start: 2024-08-16 | End: 2024-08-16

## 2024-08-16 RX ORDER — SCOLOPAMINE TRANSDERMAL SYSTEM 1 MG/1
1 PATCH, EXTENDED RELEASE TRANSDERMAL ONCE
Status: COMPLETED | OUTPATIENT
Start: 2024-08-16 | End: 2024-08-19

## 2024-08-16 RX ORDER — HYDROMORPHONE HCL/PF 1 MG/ML
SYRINGE (ML) INJECTION
Status: DISPENSED
Start: 2024-08-16 | End: 2024-08-17

## 2024-08-16 RX ORDER — DEXAMETHASONE SODIUM PHOSPHATE 10 MG/ML
INJECTION, SOLUTION INTRAMUSCULAR; INTRAVENOUS AS NEEDED
Status: DISCONTINUED | OUTPATIENT
Start: 2024-08-16 | End: 2024-08-16

## 2024-08-16 RX ORDER — FENTANYL CITRATE/PF 50 MCG/ML
25 SYRINGE (ML) INJECTION
Status: DISCONTINUED | OUTPATIENT
Start: 2024-08-16 | End: 2024-08-16 | Stop reason: HOSPADM

## 2024-08-16 RX ORDER — LIDOCAINE HYDROCHLORIDE 20 MG/ML
INJECTION, SOLUTION EPIDURAL; INFILTRATION; INTRACAUDAL; PERINEURAL AS NEEDED
Status: DISCONTINUED | OUTPATIENT
Start: 2024-08-16 | End: 2024-08-16

## 2024-08-16 RX ORDER — ONDANSETRON 2 MG/ML
4 INJECTION INTRAMUSCULAR; INTRAVENOUS ONCE AS NEEDED
Status: DISCONTINUED | OUTPATIENT
Start: 2024-08-16 | End: 2024-08-16 | Stop reason: HOSPADM

## 2024-08-16 RX ORDER — LABETALOL HYDROCHLORIDE 5 MG/ML
INJECTION, SOLUTION INTRAVENOUS AS NEEDED
Status: DISCONTINUED | OUTPATIENT
Start: 2024-08-16 | End: 2024-08-16

## 2024-08-16 RX ORDER — CEFAZOLIN SODIUM 1 G/3ML
INJECTION, POWDER, FOR SOLUTION INTRAMUSCULAR; INTRAVENOUS AS NEEDED
Status: DISCONTINUED | OUTPATIENT
Start: 2024-08-16 | End: 2024-08-16

## 2024-08-16 RX ORDER — SODIUM CHLORIDE, SODIUM LACTATE, POTASSIUM CHLORIDE, CALCIUM CHLORIDE 600; 310; 30; 20 MG/100ML; MG/100ML; MG/100ML; MG/100ML
125 INJECTION, SOLUTION INTRAVENOUS CONTINUOUS
Status: DISCONTINUED | OUTPATIENT
Start: 2024-08-16 | End: 2024-08-18

## 2024-08-16 RX ORDER — FENTANYL CITRATE 50 UG/ML
INJECTION, SOLUTION INTRAMUSCULAR; INTRAVENOUS AS NEEDED
Status: DISCONTINUED | OUTPATIENT
Start: 2024-08-16 | End: 2024-08-16

## 2024-08-16 RX ORDER — SCOLOPAMINE TRANSDERMAL SYSTEM 1 MG/1
PATCH, EXTENDED RELEASE TRANSDERMAL
Status: DISPENSED
Start: 2024-08-16 | End: 2024-08-17

## 2024-08-16 RX ORDER — HYDROMORPHONE HCL/PF 1 MG/ML
0.5 SYRINGE (ML) INJECTION ONCE
Status: COMPLETED | OUTPATIENT
Start: 2024-08-16 | End: 2024-08-16

## 2024-08-16 RX ADMIN — ACETAMINOPHEN 1000 MG: 10 INJECTION INTRAVENOUS at 18:29

## 2024-08-16 RX ADMIN — POTASSIUM PHOSPHATE, MONOBASIC AND POTASSIUM PHOSPHATE, DIBASIC 21 MMOL: 224; 236 INJECTION, SOLUTION, CONCENTRATE INTRAVENOUS at 10:05

## 2024-08-16 RX ADMIN — POTASSIUM CHLORIDE 20 MEQ: 14.9 INJECTION, SOLUTION INTRAVENOUS at 08:17

## 2024-08-16 RX ADMIN — DOCUSATE SODIUM 100 MG: 100 CAPSULE, LIQUID FILLED ORAL at 08:12

## 2024-08-16 RX ADMIN — HYDROMORPHONE HYDROCHLORIDE 0.5 MG: 1 INJECTION, SOLUTION INTRAMUSCULAR; INTRAVENOUS; SUBCUTANEOUS at 18:25

## 2024-08-16 RX ADMIN — PANTOPRAZOLE SODIUM 40 MG: 40 TABLET, DELAYED RELEASE ORAL at 05:40

## 2024-08-16 RX ADMIN — VENLAFAXINE HYDROCHLORIDE 37.5 MG: 37.5 CAPSULE, EXTENDED RELEASE ORAL at 08:12

## 2024-08-16 RX ADMIN — CEFAZOLIN 1000 MG: 1 INJECTION, POWDER, FOR SOLUTION INTRAMUSCULAR; INTRAVENOUS at 16:43

## 2024-08-16 RX ADMIN — LEVOTHYROXINE SODIUM 112 MCG: 112 TABLET ORAL at 05:40

## 2024-08-16 RX ADMIN — ONDANSETRON 4 MG: 2 INJECTION INTRAMUSCULAR; INTRAVENOUS at 16:43

## 2024-08-16 RX ADMIN — HYDROCODONE BITARTRATE AND ACETAMINOPHEN 1 TABLET: 5; 325 TABLET ORAL at 10:09

## 2024-08-16 RX ADMIN — SODIUM CHLORIDE, SODIUM GLUCONATE, SODIUM ACETATE, POTASSIUM CHLORIDE, MAGNESIUM CHLORIDE, SODIUM PHOSPHATE, DIBASIC, AND POTASSIUM PHOSPHATE 100 ML/HR: .53; .5; .37; .037; .03; .012; .00082 INJECTION, SOLUTION INTRAVENOUS at 08:16

## 2024-08-16 RX ADMIN — ONDANSETRON 4 MG: 2 INJECTION INTRAMUSCULAR; INTRAVENOUS at 10:37

## 2024-08-16 RX ADMIN — LIDOCAINE HYDROCHLORIDE 100 MG: 20 INJECTION, SOLUTION EPIDURAL; INFILTRATION; INTRACAUDAL; PERINEURAL at 16:39

## 2024-08-16 RX ADMIN — SODIUM CHLORIDE, SODIUM GLUCONATE, SODIUM ACETATE, POTASSIUM CHLORIDE, MAGNESIUM CHLORIDE, SODIUM PHOSPHATE, DIBASIC, AND POTASSIUM PHOSPHATE 100 ML/HR: .53; .5; .37; .037; .03; .012; .00082 INJECTION, SOLUTION INTRAVENOUS at 18:48

## 2024-08-16 RX ADMIN — DEXAMETHASONE SODIUM PHOSPHATE 10 MG: 10 INJECTION, SOLUTION INTRAMUSCULAR; INTRAVENOUS at 16:43

## 2024-08-16 RX ADMIN — FENTANYL CITRATE 25 MCG: 50 INJECTION INTRAMUSCULAR; INTRAVENOUS at 18:19

## 2024-08-16 RX ADMIN — FENTANYL CITRATE 25 MCG: 50 INJECTION INTRAMUSCULAR; INTRAVENOUS at 18:32

## 2024-08-16 RX ADMIN — ROCURONIUM BROMIDE 50 MG: 10 INJECTION, SOLUTION INTRAVENOUS at 16:43

## 2024-08-16 RX ADMIN — SCOPALAMINE 1 PATCH: 1 PATCH, EXTENDED RELEASE TRANSDERMAL at 15:42

## 2024-08-16 RX ADMIN — SODIUM CHLORIDE, SODIUM LACTATE, POTASSIUM CHLORIDE, AND CALCIUM CHLORIDE 125 ML/HR: .6; .31; .03; .02 INJECTION, SOLUTION INTRAVENOUS at 15:17

## 2024-08-16 RX ADMIN — LABETALOL HYDROCHLORIDE 5 MG: 5 INJECTION, SOLUTION INTRAVENOUS at 17:37

## 2024-08-16 RX ADMIN — POTASSIUM CHLORIDE 20 MEQ: 14.9 INJECTION, SOLUTION INTRAVENOUS at 10:04

## 2024-08-16 RX ADMIN — MIDAZOLAM 2 MG: 1 INJECTION INTRAMUSCULAR; INTRAVENOUS at 16:34

## 2024-08-16 RX ADMIN — FENTANYL CITRATE 100 MCG: 50 INJECTION INTRAMUSCULAR; INTRAVENOUS at 16:39

## 2024-08-16 RX ADMIN — PROPOFOL 80 MCG/KG/MIN: 10 INJECTION, EMULSION INTRAVENOUS at 16:43

## 2024-08-16 RX ADMIN — PRAVASTATIN SODIUM 20 MG: 20 TABLET ORAL at 08:12

## 2024-08-16 RX ADMIN — PHENYLEPHRINE HYDROCHLORIDE 50 MCG/MIN: 10 INJECTION INTRAVENOUS at 16:51

## 2024-08-16 RX ADMIN — PROPOFOL 100 MG: 10 INJECTION, EMULSION INTRAVENOUS at 16:39

## 2024-08-16 RX ADMIN — SUGAMMADEX 200 MG: 100 INJECTION, SOLUTION INTRAVENOUS at 17:48

## 2024-08-16 NOTE — OP NOTE
OPERATIVE REPORT  PATIENT NAME: Jessica Larson    :  1965  MRN: [id number]  Pt Location: [facility] OR ROOM 02    SURGERY DATE: 2024    Surgeons and Role:  Boogie Christianson MD - Primary    Preop Diagnosis:  Right knee septic arthritis    Postop Diagnosis:  Right knee septic arthritis    Procedure(s):  Right knee arthroscopic I&D    Specimen(s):  ID Type Source Tests Collected by Time Destination   1 : right knee tissue Tissue Joint, Right Knee TISSUE EXAM Boogie Christianson MD 2024  5:11 PM    A : right knee fluid Tissue Joint, Right Knee ANAEROBIC CULTURE AND GRAM STAIN, CULTURE, TISSUE AND GRAM STAIN Boogie Christianson MD 2024  5:10 PM    B : right knee tissue culture Tissue Joint, Right Knee ANAEROBIC CULTURE AND GRAM STAIN, CULTURE, TISSUE AND GRAM STAIN Boogie Christianson MD 2024  5:22 PM      Estimated Blood Loss:   15 mL    Drains:  None    Anesthesia Type:   General endotracheal    Antibiotics:   1 g ancef, ceftriaxone and vancomycin      Operative Indications:  Patient is a 59-year-old female that presented to the emergency department with 3 days of right knee swelling and pain.  Patient had elevated inflammatory labs including WBC, CRP, and ESR.  The patient's right knee was aspirated in the emergency department with synovial fluid analysis revealing an elevated cell count with negative crystals and cultures pending.  Patient was unable to bear weight and had micromotion tenderness with any range of motion of the knee on exam.  I discussed with the patient that her symptoms, exam, and lab values were consistent with septic arthritis of the knee.  I discussed potential treatment options including nonoperative and operative management.  I recommended operative intervention for suspected infection to minimize further damage to the cartilage as well as prevention of spread of the infection particularly in the setting of the patient's immune suppression. All risks and benefits of the procedure were discussed  with the patient in great detail including but not limited to bleeding, infection, blood clots, pain, stiffness, neurovascular damage, persistent infection requiring further procedures, development of osteoarthritis 2/2 infection, wound complications, anesthesia complications, the possibility loss of life and surgery, heart attack, stroke, etc. The patient demonstrated understanding of the discussion and was in agreement with surgical intervention.  All the patient's questions were answered. Consent was obtained from the patient.         Operative Findings:  Arthroscopic evaluation of the knee revealed the following:     Medial meniscus: Fraying but no discrete tears noted.  Stable to probing including the posterior root.    Medial femoral condyle: Focal area of grade II chondral defects  Medial tibial plateau: No chondral defects noted.   Anterior cruciate ligament: Normal appearance  Posterior cruciate ligament: Normal appearance.   Lateral meniscus: Fraying but no discrete tears noted.  Stable to probing including the posterior root.    Lateral femoral condyle: No chondral defects noted  Lateral tibial plateau: No chondral defects noted  Medial and lateral gutters: No loose bodies.  Inflamed synovium noted.  Patella: Grade II chondral defects with small area of grade III focal chondral defect on the lateral facet.   Trochlea: Grade II chondral defects.   Medial plica: No significant plica was present.  Fat pad and suprapatellar pouch were noted to have inflamed synovium.     Procedure:    In the pre-operative holding area, the patient identified the correct operative extremity and I marked that extremity with my initials, using a permanent marker. The patient was brought to the operating room and positioned supine.  All bony prominences were well-padded.  Following satisfactory induction of anesthesia, the knee was prepped and draped in the usual sterile fashion for surgical arthroscopy. Before any surgical  instrumentation was passed to me by the surgical technician, a formalized time-out occurred, which involved the surgeon, circulating nurse, and anesthesia staff all verifying the correct operative extremity, procedure, and antibiotic administration. My initials were visible on the prepped and draped operative field.      The anatomic landmarks of the anteromedial and anterolateral portals were marked.  A superior lateral outflow portal was established with a scalpel and an outflow cannula was placed in the portal.  Approximately 30 cc of cloudy, turbid fluid was expressed from the joint.  The fluid was sent for culture. The anterolateral portal was established with a scalpel. The arthroscope was introduced through this portal. A systematic diagnostic arthroscopy evaluated the following:  medial compartment, notch, lateral compartment, patellofemoral compartment, medial gutter, and lateral gutter, posterior medial and posterior lateral compartments.       The synovium was noted to be inflamed particularly in the suprapatellar pouch.  Multiple samples of the synovium were collected and sent for both culture and pathology.  The inflamed synovium was then debrided with an arthroscopic shaver down to healthy, bleeding tissue.  Once the synovium was debrided, the knee was irrigated with 12 L of normal saline.  The camera was moved through each compartment including the posterior medial and posterior lateral compartments during the irrigation.  Clear fluid was noted out of the superior lateral cannula during the irrigation.  Because of this, placement of a drain was deferred.       The portals were closed with interrupted 4-0 Nylon suture. The skin was cleansed with sterile saline and dried before Xeroform was applied. Finally, a sterile dressing was secured by Webril and an Ace wrap.  The patient tolerated the procedure without complication and was transported to the recovery room in stable condition.    Complications:    None    Patient Disposition:  PACU     I was present for the entire procedure.      SIGNATURE: Boogie Christianson MD  DATE: August 16, 2024  TIME: 6:14 PM

## 2024-08-16 NOTE — CASE MANAGEMENT
Case Management Assessment & Discharge Planning Note    Patient name Jessica Larson  Location /-01 MRN [id number]  : 1965 Date 2024       Current Admission Date: 8/15/2024  Current Admission Diagnosis:Acute pain of right knee   Patient Active Problem List    Diagnosis Date Noted Date Diagnosed    Acute pain of right knee 08/15/2024     Primary hypertension 08/15/2024     Elevated fasting glucose 2024     Adrenal adenoma 2023 10/03/2023    Gastroesophageal reflux disease without esophagitis 2023     Achilles tendinitis of left lower extremity 2022     Anorexia symptom 2022     Cancer (HCC)      Hiatal hernia      Sleep disorder 2019     Encounter for care related to vascular access port 10/18/2019     Crohn's disease (HCC) 10/02/2019     Osteoporosis 2019     Degeneration of thoracic intervertebral disc 2019     Menopausal flushing 2019     Biliary dyskinesia 2018     Gallbladder polyp 2018     Chronic neck pain 2018     Benign neoplasm of colon 2018     Cobalamin deficiency 2018     Crohn's disease of colon (HCC) 2018     Diverticular disease of colon 2018     History of colonic polyps 2018     Internal hemorrhoids without complication 2018     GEETHA III (vulvar intraepithelial neoplasia III) 2016     Malignant tumor of thyroid gland (HCC) 2010     Postoperative hypothyroidism 2010       LOS (days): 1  Geometric Mean LOS (GMLOS) (days): 3.3  Days to GMLOS:2.4     OBJECTIVE:    Risk of Unplanned Readmission Score: 9.48         Current admission status: Inpatient  Referral Reason: Other (D/C planning)    Preferred Pharmacy:   [facility] - [place], [place] - [address]  [address] [address]  Phone: [phone] Fax: [phone]    [facility] [address] [place] [address] [place] - [address]  [place] [address]  Phone: [phone] Fax: [phone]    Primary Care Provider: Nicola Alonso MD    Primary Insurance: MEDICARE  Secondary Insurance: AETNA    ASSESSMENT:  Active Health Care Proxies    There are no active Health Care Proxies on file.       Advance Directives  Does patient have a Health Care POA?: No  Was patient offered paperwork?: Yes  Does patient currently have a Health Care decision maker?: No  Does patient have Advance Directives?: No  Was patient offered paperwork?: Yes  Primary Contact: Armani Larson         Readmission Root Cause  30 Day Readmission: No    Patient Information  Admitted from:: Home  Mental Status: Alert  During Assessment patient was accompanied by: Spouse  Assessment information provided by:: Patient, Spouse  Primary Caregiver: Self  Support Systems: Spouse/significant other, Family members, Friends/neighbors  County of Residence: [place]  [place] [facility] do you live in?: [place]  Home entry access options. Select all that apply.: Stairs  Number of steps to enter home.: 2  Do the steps have railings?: Yes  Type of Current Residence: [facility]  Living Arrangements: Lives w/ Spouse/significant other  Is patient a ?: No    Activities of Daily Living Prior to Admission  Functional Status: Independent  Completes ADLs independently?: Yes  Ambulates independently?: Yes  Does patient use assisted devices?: Yes  Assisted Devices (DME) used: Shower Chair, Straight Cane  Does patient currently own DME?: Yes  What DME does the patient currently own?: Shower Chair, Straight Cane  Does patient have a history of Outpatient Therapy (PT/OT)?: Yes  Does the patient have a history of Short-Term Rehab?: No  Does patient have a history of HHC?: No  Does patient currently have HHC?: No         Patient Information Continued  Income Source: SSI/SSD  Does patient have prescription coverage?: Yes  Does patient receive dialysis treatments?: No  Does patient have a history of  substance abuse?: No  Does patient have a history of Mental Health Diagnosis?: No         Means of Transportation  Means of Transport to Appts:: Family transport      Social Determinants of Health (SDOH)      Flowsheet Row Most Recent Value   Housing Stability    In the last 12 months, was there a time when you were not able to pay the mortgage or rent on time? N   In the past 12 months, how many times have you moved where you were living? 0   At any time in the past 12 months, were you homeless or living in a shelter (including now)? N   Transportation Needs    In the past 12 months, has lack of transportation kept you from medical appointments or from getting medications? no   In the past 12 months, has lack of transportation kept you from meetings, work, or from getting things needed for daily living? No   Food Insecurity    Within the past 12 months, you worried that your food would run out before you got the money to buy more. Never true   Within the past 12 months, the food you bought just didn't last and you didn't have money to get more. Never true   Utilities    In the past 12 months has the electric, gas, oil, or water company threatened to shut off services in your home? No            DISCHARGE DETAILS:    Discharge planning discussed with:: Pt, spouse  Freedom of Choice: Yes     CM contacted family/caregiver?: Yes  Were Treatment Team discharge recommendations reviewed with patient/caregiver?: Yes  Did patient/caregiver verbalize understanding of patient care needs?: Yes  Were patient/caregiver advised of the risks associated with not following Treatment Team discharge recommendations?: Yes    Contacts  Patient Contacts: Armani Larson  Relationship to Patient:: Family  Contact Method: In Person  Reason/Outcome: Continuity of Care, Discharge Planning              Other Referral/Resources/Interventions Provided:  Referral Comments: CM met with pt and  at bedside to conduct assessment. Pt reported no  CM needs at this time. CM will continue to follow.    Would you like to participate in our [facility] service program?  : No - Declined    Treatment Team Recommendation: Home  Discharge Destination Plan:: Home  Transport at Discharge : Family

## 2024-08-16 NOTE — CONSULTS
Orthopedics Consult Note      Chief Complaint:   Right knee pain    HPI:   59 y.o. female that presented to the ED with right knee pain. The patient has a PMH of Crohn's Disease currently on upadacitinib.  The patient states that she developed right knee pain and swelling 3 days PTA. She also noticed erythema around the knee. She had difficulty ambulating due to the pain. Endorses fevers, chills, vomiting. Yesterday she  went to  where there was concern for septic arthritis and the patient was sent to the ED for further evaluation. In the ED, patient had elevated inflammatory markers and a large effusion. She knee was aspirated in the ED and resulted in purulent fluid with an elevated synovial cell count. The patient continues to have pain and inability to bear weight. She was started on ceftriaxone and vancomycin upon admission. Patient denies prior surgeries in the right knee.      Past Medical History:   Diagnosis Date    Cancer (HCC)     thyroid,vulvar    Chronic pain disorder     Crohn's disease (HCC)     Disease of thyroid gland     GERD (gastroesophageal reflux disease)     Hiatal hernia     Hyperlipidemia     PONV (postoperative nausea and vomiting)        Past Surgical History:   Procedure Laterality Date    CERVICAL FUSION      C 6-7    COLONOSCOPY  2004    2006,2012,2014,2016,2018    EGD  2012    EGD  2016    HYSTERECTOMY  2013    NECK SURGERY  2011     plates and screws in neck bewtween 6-7    AL INSJ TUNNELED CTR VAD W/SUBQ PORT AGE 5 YR/> Left 10/10/2019    Procedure: INSERTION VENOUS PORT (PORT-A-CATH);  Surgeon: Juan Johns MD;  Location:  MAIN OR;  Service: General    AL LAPS SURG CHOLECYSTECTOMY W/CHOLANGIOGRAPHY N/A 9/27/2018    Procedure: LAPAROSCOPIC CHOLECYSTECTOMY WITH CHOLANGIOGRAM;  Surgeon: Juan Johns MD;  Location:  MAIN OR;  Service: General    SIMPLE VULVECTOMY  12/24/2014    THYROIDECTOMY         Family History   Problem Relation Age of Onset    Crohn's disease Mother      Osteoporosis Mother             Heart disease Father     Diabetes type II Father         Is under control    Hypertension Father     No Known Problems Sister     No Known Problems Maternal Grandmother     No Known Problems Paternal Grandmother     Hypertension Brother     Thyroid cancer Family         Me,removed       Social History       Physical Exam:   Vitals:    24 1511   BP: 144/78   Pulse: 70   Resp: 18   Temp: 97.5 °F (36.4 °C)   SpO2: 98%       General/Constitutional: NAD, well developed, well nourished  HENT: Normocephalic, atraumatic  CV: Intact distal pulses, regular rate  Resp: No respiratory distress or labored breathing  GI: Soft and non-tender   Neuro: Alert and Oriented x 3  Psych: Normal mood, normal affect, normal judgement, normal behavior  Skin: Warm, dry, no rashes    Focused Musculoskeletal Exam: right lower extremity  Skin intact  Moderate effusion  Mild erythema around the knee  Micromotion tenderness noted  Tenderness to palpation globally around the knee   Sensation intact L3-S1. SILT SPN, DPN, sural, saphenous, tibial nerve distributions  Intact ankle dorsi/plantar flexion, EHL/FHL  2+ DP/ PT pulse, brisk cap refill, toes WWP  Musculature is soft and compressible, no pain with passive stretch    Radiology:   I personally reviewed the films.  X-rays and CT of the right knee were obtained on 8/15/2024 which show mild OA and a large effusion    Labs:  WBC 11.4    ESR 66    Synovial cell count 140,118  PMN 94%  Crystals negative  Cultures pending      Assessment:  59 y.o.female with right knee pain and swelling for 3 days with labs and exam concerning for septic arthritis    Plan:   Non weight bearing right lower extremity  Analgesics for pain  NPO   Medicine consult for all medical management and preoperative risk stratification  Abx: ceftriaxone and vancomycin   Plan for right knee arthroscopic I&D today     The patient's symptoms, exam, blood work, and knee aspirate  analysis are all consistent with septic arthritis of the knee.  I discussed both nonoperative and operative intervention with the patient.  I recommended operative intervention for suspected infection to minimize further damage to the cartilage as well as prevention of spread of the infection particularly in the setting of the patient's immune suppression. All risks and benefits of the procedure were discussed with the patient in great detail including but not limited to bleeding, infection, blood clots, pain, stiffness, neurovascular damage, persistent infection requiring further procedures, development of osteoarthritis 2/2 infection, wound complications, anesthesia complications, the possibility loss of life and surgery, heart attack, stroke, etc. The patient demonstrated understanding of the discussion and was in agreement with surgical intervention.  All the patient's questions were answered. Consent was obtained from the patient.

## 2024-08-16 NOTE — PROGRESS NOTES
Progress Note - Orthopedics   Jessica Larson 59 y.o. female MRN: [id number]  Unit/Bed#: OR [place] Encounter: [id number]    Assessment:  58 yo female with right knee pain and swelling with labs and exam consistent with septic arthritis now s/p right knee arthroscopic I&D on 8/16/2024    Plan:  NWB RLE for first 2 days post-op, then can transition to WBAT  Knee range of motion as tolerated starting POD0  PT/OT  Monitor for ABLA  DVT prophylaxis: lovenox in house, can transition to ASA 81 mg bid on discharge for 2 weeks post-op; SCDs  Abx: ceftriaxone and vancomycin per primary  ID consult  Follow up synovial fluid cultures and pathology results, currently NGTD for cultures  Medical management per primary team  Dispo: Ortho will follow    Weight bearing: NWB RLE    VTE Pharmacologic Prophylaxis: Enoxaparin (Lovenox)  VTE Mechanical Prophylaxis: sequential compression device        Subjective: Patient resting comfortably in PACU. Briefly discussed the intra-operative findings and procedure performed with the patient. I also called the patient's  to update him on the case.    Vitals: Blood pressure 144/78, pulse 70, temperature 97.5 °F (36.4 °C), temperature source Temporal, resp. rate 18, height 5' (1.524 m), weight 44 kg (97 lb), SpO2 98%.,Body mass index is 18.94 kg/m².      Intake/Output Summary (Last 24 hours) at 8/16/2024 1810  Last data filed at 8/16/2024 1803  Gross per 24 hour   Intake 891.41 ml   Output --   Net 891.41 ml       Invasive Devices       Central Venous Catheter Line  Duration             Port A Cath 10/01/19 Left Chest 1781 days              Peripheral Intravenous Line  Duration             Peripheral IV 08/15/24 Right Antecubital 1 day    Peripheral IV 08/15/24 Left Antecubital <1 day                    Physical Exam:     Right lower extremity  Dressings c/d/i   Compartments soft and compressible.  Sensation intact to light touch in SPN, DPN, sural, saphenous, and tibial distributions  Motor  intact to DF/PF, EHL/FHL  Foot warm and well perfused, palpable DP pulse, brisk capillary refill      Lab, Imaging and other studies:     CBC:   Lab Results   Component Value Date    WBC 10.76 (H) 08/16/2024    HGB 11.0 (L) 08/16/2024    HCT 33.4 (L) 08/16/2024    MCV 94 08/16/2024     08/16/2024    RBC 3.56 (L) 08/16/2024    MCH 30.9 08/16/2024    MCHC 32.9 08/16/2024    RDW 12.7 08/16/2024    MPV 10.3 08/16/2024    NRBC 0 08/16/2024     CMP:   Lab Results   Component Value Date    SODIUM 136 08/16/2024    CL 95 (L) 08/16/2024    CO2 32 08/16/2024    BUN 6 08/16/2024    CREATININE 0.49 (L) 08/16/2024    CALCIUM 8.7 08/16/2024    AST 24 08/16/2024    ALT 25 08/16/2024    ALKPHOS 64 08/16/2024    EGFR 106 08/16/2024

## 2024-08-16 NOTE — DISCHARGE INSTR - AVS FIRST PAGE
Doxycycline administration instructions:  -Please take with glass of water and sit upright for 30 minutes after   -Separate from multivitamins and antacids by 4 hours   -Avoid prolonged sun exposure and/or wear sunscreen due photosensitivity       Please follow-up with GI doctor on initiation of Crohn's medication  Please follow-up with your PCP within 7-14 days      POSTOPERATIVE INSTRUCTIONS - KNEE SURGERY    MEDICATIONS:  - Resume all home medications unless otherwise instructed by your surgeon.  - Pain Medication:  Oxycodone (5 mg, 1 tablet every 4 hours as needed) and Tylenol (500 mg, 2 tablets every 8 hours for 4 weeks)  - If you were given a regional anesthetic (nerve block), please begin taking the pain medication as soon as you get home, even if you have minimal or no pain.  DO NOT WAIT FOR THE NERVE BLOCK TO WEAR OFF.  - Possible side effects include nausea, constipation, and urinary retention.  If you experience these side effects, please call our office for assistance.  - Pain medication refills are authorized only during office hours (8am-4pm, Mon-Fri).  - Anti-Inflammatory:  Meloxicam (15 mg, 1 tablet daily for 4 weeks)   - TAKE WITH FOOD.  Stop if you experience nausea, reflux, or stomach pain.  - Do not take other anti-inflammatory medications along with meloxicam such as ibuprofen, diclofenac advil, naproxen, etc.  - Nausea Medication:  Zofran (4 mg, take one tablet every 8 hours as needed for nausea or vomiting)  - Blood clot prevention: Aspirin (81 mg, 1 tablet twice daily for 2 weeks)  - Antibiotics: per Infectious Disease    WOUND CARE:  - Maintain your operative dressing. Keep the dressings clean and dry.    - It is normal for the knee to bleed and swell following surgery. If blood soaks through the bandage, do not become alarmed, reinforce with additional dressing.  - Dressings should remain in place until your first post-operative appointment. Do not remove any of the dressings prior to the  appointment.  - DO NOT place incisions under water (bath, pool) until given approval by our office.  - Please call our office ([phone]) if you experience either of the following:  - Sudden increase in swelling, redness, or warmth at the surgical site  - Excessive incisional drainage that persists beyond the 3rd day after surgery  - Oral temperature greater than 101 degrees, not relieved with Tylenol  - Shortness of breath, chest pain, nausea, or any other concerning symptoms  - Severe distal arm pain or significant swelling of the distal arm and/or hand.    ACTIVITY:   - You are non-weightbearing on the operative leg for 2 days with crutches for ambulation.  You can start weightbearing as tolerated on postoperative day 3 and wean off the crutches at that time.  - Elevate the operative leg to chest level whenever possible to decrease swelling.  - Do not place pillows under knees (i.e. do not place knee in a flexed or bent position), but rather place pillows under the foot/ankle.  - Avoid long periods of sitting (without leg elevated) or long distance traveling for the first 2 weeks following surgery.  - Do not engage in activities which increase knee pain/swelling (prolonged periods of standing or walking) for the first 7-10 days following surgery.  - May return to sedentary work ONLY or school 3-4 days after surgery, if pain is tolerable    DRIVING:  - You are not allowed to drive while on narcotic medications (oxycodone)   - Do not drive until instructed to by Dr. Christianson    DIET:  - Begin with clear liquids and light foods (jellos, soups, etc.)  - Progress to your normal diet if you are not nauseated    SWELLING CONTROL:  - Icing is very important in the initial post-operative period and should begin immediately after surgery.  - Cold Therapy: apply ice (20 min on, 20 min off) as often as you feel is necessary.  - Care should be taken when icing to avoid frostbite to the skin.    PHYSICAL THERAPY:  -You can  start knee range of motion exercises starting on post operative day 3 focusing on achieving full extension of the knee  -We will provide you with a physical therapy prescription at your first postoperative appointment.  You can make a an appointment with physical therapy to start postoperative week 2.    FOLLOW-UP APPOINTMENT:  - If you do not already have a post-operative appointment scheduled, please contact our office at [phone] to schedule.  - Typically the 1st post-operative appointment following surgery is 7-10 days following surgery  - At the first post-operative visit, Dr. Christianson will do a wound check, go over therapy protocols and answer any questions you may have.  - If you have any further questions please contact the office.    **EMERGENCIES**  - Contact Dr. Christianson's office at [phone] if any of the following are present:  - Painful swelling or numbness (note that some swelling and numbness is normal)  - Unrelenting pain  - Fever (over 101° - it is normal to have a low-grade fever or chills for the 1st day or 2 following surgery)  - Redness around incisions  - Color change in the operative extremity  - Continuous drainage or bleeding from incision (a small amount of drainage is expected)  - Difficulty breathing  - Excessive nausea/vomiting  - Calf pain  - If you have an emergency after office hours or on the weekend, proceed to the nearest emergency room.

## 2024-08-16 NOTE — INTERIM OP NOTE
Right knee arthroscopic I&D  Postoperative Note  PATIENT NAME: Jessica Larson  : 1965  MRN: [id number]  [facility] OR ROOM 02    Surgery Date: 2024    Preop Diagnosis:  Septic arthritis (HCC) [M00.9]    Post-Op Diagnosis Codes:     * Septic arthritis (HCC) [M00.9]    Procedure(s) (LRB):  Right knee arthroscopic I&D (Right)    Surgeons and Role:     * Boogie Christianson MD - Primary    Specimens:  ID Type Source Tests Collected by Time Destination   1 : right knee tissue Tissue Joint, Right Knee TISSUE EXAM Boogie Christianson MD 2024  5:11 PM    A : right knee fluid Tissue Joint, Right Knee ANAEROBIC CULTURE AND GRAM STAIN, CULTURE, TISSUE AND GRAM STAIN Boogie Christianson MD 2024  5:10 PM    B : right knee tissue culture Tissue Joint, Right Knee ANAEROBIC CULTURE AND GRAM STAIN, CULTURE, TISSUE AND GRAM STAIN Boogie Christianson MD 2024  5:22 PM        Estimated Blood Loss:   Minimal    Anesthesia Type:   Choice     Findings:   Cloudy fluid present sent for culture and pathology    Complications:   None      SIGNATURE: Boogie Christianson MD   DATE: 2024   TIME: 6:01 PM

## 2024-08-16 NOTE — ANESTHESIA PREPROCEDURE EVALUATION
Procedure:  Right knee arthroscopic I&D (Right: Knee)    Relevant Problems   ANESTHESIA   (+) PONV (postoperative nausea and vomiting)      CARDIO   (+) Primary hypertension      ENDO   (+) Postoperative hypothyroidism      GI/HEPATIC   (+) Gastroesophageal reflux disease without esophagitis   (+) Hiatal hernia      MUSCULOSKELETAL   (+) Degeneration of thoracic intervertebral disc   (+) Hiatal hernia      NEURO/PSYCH   (+) Chronic neck pain      Digestive   (+) Gallbladder polyp      Neurology/Sleep   (+) S/P cervical spinal fusion      Orthopedic/Musculoskeletal   (+) Acute pain of right knee      FEN/Gastrointestinal   (+) Crohn's disease of colon (HCC)      Other   (+) Crohn's disease (HCC)        Physical Exam    Airway    Mallampati score: III  TM Distance: >3 FB  Neck ROM: full     Dental        Cardiovascular      Pulmonary   No wheezes    Other Findings  post-pubertal.      Anesthesia Plan  ASA Score- 2     Anesthesia Type- general with ASA Monitors.         Additional Monitors:     Airway Plan: ETT.    Comment: Consent for regional PRN.       Plan Factors-Exercise tolerance (METS): >4 METS.    Chart reviewed. EKG reviewed.  Existing labs reviewed. Patient summary reviewed.    Patient is not a current smoker.  Patient did not smoke on day of surgery.            Induction- intravenous.    Postoperative Plan- Plan for postoperative opioid use. Planned trial extubation    Perioperative Resuscitation Plan - Level 1 - Full Code.       Informed Consent- Anesthetic plan and risks discussed with patient.  I personally reviewed this patient with the CRNA. Discussed and agreed on the Anesthesia Plan with the CRNA..      VITALS  /78   Pulse 70   Temp 97.5 °F (36.4 °C) (Temporal)   Resp 18   Ht 5' (1.524 m)   Wt 44 kg (97 lb)   SpO2 98%   BMI 18.94 kg/m²  BP Readings from Last 3 Encounters:   08/16/24 144/78   08/15/24 147/72   05/23/24 112/68        LABS  Results from Last 12 Months   Lab Units  "08/16/24  0440 08/15/24  1409   WBC Thousand/uL 10.76* 11.44*   HEMOGLOBIN g/dL 11.0* 12.4   HEMATOCRIT % 33.4* 37.0   PLATELETS Thousands/uL 254 266     Results from Last 12 Months   Lab Units 08/16/24  0440 08/15/24  1409 05/17/24  0938   SODIUM mmol/L 136 132* 135   POTASSIUM mmol/L 2.9* 3.5 4.0   CHLORIDE mmol/L 95* 91* 98   CO2 mmol/L 32 31 31   ANION GAP mmol/L 9 10 6   BUN mg/dL 6 9 11   CREATININE mg/dL 0.49* 0.63 0.70   CALCIUM mg/dL 8.7 9.6 9.5   GLUCOSE RANDOM mg/dL 109 166*  --    PHOSPHORUS mg/dL 2.6*  --   --    MAGNESIUM mg/dL 2.2  --   --    HEMOGLOBIN A1C %  --   --  5.4     No results for input(s): \"APTT\", \"INR\", \"PTT\" in the last 8784 hours.    ECG      ECHOCARDIOGRAPHY OR OTHER TESTING/IMAGING  Encounter Date: 08/15/24   ECG 12 lead   Result Value    Ventricular Rate 69    Atrial Rate 69    UT Interval 146    QRSD Interval 78    QT Interval 416    QTC Interval 445    P Axis 49    QRS Axis 67    T Wave Axis 75    Narrative    Normal sinus rhythm  Nonspecific T wave abnormality  Abnormal ECG  When compared with ECG of 03-OCT-2019 11:30,  Non-specific change in ST segment in Lateral leads    Confirmed by Marquis Perez ([id number]) on 8/16/2024 12:05:28 PM     No results found for this or any previous visit. N/a     ------- ANESTHESIA RISK-BENEFIT DISCUSSION -------  BENEFITS OF A SPECIALIZED ANESTHESIA TEAM INCLUDE (NBK [id number], PMID 39807506):  (1) Reduce mortality and morbidity for major surgeries/procedures. (2) The team provides analgesia/sedation/amnesia/akinesia as safely as possible. (3) The team strives to reduce discomfort as safely as possible.    RISKS, AND PLANS TO MITIGATE RISKS, INCLUDE:    - Neurologic system: IntraOp awareness (Risk is ~1:1,000 - 1:14,000; PMID 68957969), Stroke (Risk ~<0.1-2% for most cases; PMID 38941891), nerve injury, vision loss, and POCD.  Since regional anesthesia may be used, risks of block failure, bleeding, infection, and nerve injury were discussed.  - Airway " and Pulmonary system: Dental or mouth injury, throat pain, critical hypoxia, pneumothorax, prolonged intubation, post-op respiratory compromise.  Airway/Intubation risks and prior data:  Ventilated by mask (1); Technique: Stylet; Type: Cuffed, Oral; Tube Size: 7 mm; Laryngoscope: GlideScope; Blade Size: 3; Location: Oral; Grade View: 1; Insertion Attempts: 1; Placement Verification: Auscultation, End tidal CO2, Symmetrical chest wall movement; Airway Comment: elective intubation with glidescope due to neck issues and limited ROM  Major ARISCAT risk factors for pulmonary complications include: none, yielding a score of 0-1= Low risk, 1.6%.  - Cardiovascular system: Hypotension, arrhythmias, cardiac injury or arrest, blood clots, bleeding, infection, vascular injuries.  Jarvis's RCRI score items: none, yielding an RCRI Score of 0= 0.4% risk of MACE  Are ericka-op or intra-op beta blockers indicated? (PMID 20673371): no  - FEN/GI system: Aspiration risk (~0.5% per PMC 3830795) and PONV (10-80% per Apfel score) especially if the patient has not fasted.  ASA NPO guideline compliance?: Yes  - Medication risk assessment: Allergic reactions, excessive bleeding with anticoagulant use, overdoses, drug-drug interactions, injury to a fetus or  in pregnant or breastfeeding patients, ericka-procedural sedation including while driving/operating machinery.  Recent relevant medications: See MAR or Med Review  Personal or family history of anesthesia complications: yes: PONV  Pregnancy Status: N/A  - Estimate mortality risks associated with anesthesia based on ASA-PS (PMID 74598688): ASA-PS II: risk 1:20,000

## 2024-08-16 NOTE — ASSESSMENT & PLAN NOTE
The patient presented with acute right knee pain for 3 days prior to admission with erythema and swelling  Concern for possible septic arthritis  Right knee was aspirated in the ED-noted purulent material  Started on ceftriaxone and vancomycin with pharmacy for dosing  Pain management  PT/OT once cleared by orthopedics  Follow-up with cultures  Orthopedic consult pending

## 2024-08-16 NOTE — PLAN OF CARE

## 2024-08-16 NOTE — PROGRESS NOTES
formerly [facility]  Progress Note  Name: Jessica Larson I  MRN: [id number]  Unit/Bed#: -01 I Date of Admission: 8/15/2024   Date of Service: 8/16/2024 I Hospital Day: 1    Assessment & Plan   * Acute pain of right knee  Assessment & Plan  The patient presented with acute right knee pain for 3 days prior to admission with erythema and swelling  Concern for possible septic arthritis  Right knee was aspirated in the ED-noted purulent material  Started on ceftriaxone and vancomycin with pharmacy for dosing  Pain management  PT/OT once cleared by orthopedics  Follow-up with cultures  Orthopedic consult pending    Primary hypertension  Assessment & Plan  BP currently stable  Home antihypertensives were held on admission his blood pressure was on the lower side  Can likely resume ARB postoperatively  Continue to hold HCTZ, resume as tolerated    Postoperative hypothyroidism  Assessment & Plan  Continue with levothyroxine    Gastroesophageal reflux disease without esophagitis  Assessment & Plan  Continue PPI    Crohn's disease (HCC)  Assessment & Plan  On upadacitinib 15 mg daily. Will hold with acute infection.   Continue to follow up outpatient            VTE Pharmacologic Prophylaxis: VTE Score: 1 Moderate Risk (Score 3-4) - Pharmacological DVT Prophylaxis Ordered: Resume anticoagulation postoperatively.    Mobility:   Basic Mobility Inpatient Raw Score: 22  JH-HLM Goal: 7: Walk 25 feet or more  JH-HLM Achieved: 5: Stand (1 or more minutes)  JH-HLM Goal achieved. Continue to encourage appropriate mobility.    Patient Centered Rounds: I performed bedside rounds with nursing staff today.   Discussions with Specialists or Other Care Team Provider: yes    Education and Discussions with Family / Patient: Patient declined call to .     Current Length of Stay: 1 day(s)  Current Patient Status: Inpatient   Certification Statement: The patient will continue to require additional inpatient  hospital stay due to suspected septic arthritis  Discharge Plan: Anticipate discharge in 48-72 hrs to home with home services.    Code Status: Level 1 - Full Code    Subjective:   No overnight events noted    Objective:     Vitals:   Temp (24hrs), Av.7 °F (37.1 °C), Min:98.1 °F (36.7 °C), Max:99.6 °F (37.6 °C)    Temp:  [98.1 °F (36.7 °C)-99.6 °F (37.6 °C)] 98.8 °F (37.1 °C)  HR:  [69-90] 74  Resp:  [16-18] 16  BP: (112-147)/(70-77) 131/77  SpO2:  [96 %-97 %] 96 %  Body mass index is 18.99 kg/m².     Input and Output Summary (last 24 hours):     Intake/Output Summary (Last 24 hours) at 2024 0813  Last data filed at 8/15/2024 1814  Gross per 24 hour   Intake 50 ml   Output --   Net 50 ml       Physical Exam:   Physical Exam  Constitutional:       General: She is not in acute distress.  HENT:      Head: Normocephalic and atraumatic.      Nose: Nose normal.      Mouth/Throat:      Mouth: Mucous membranes are moist.   Eyes:      Extraocular Movements: Extraocular movements intact.      Conjunctiva/sclera: Conjunctivae normal.   Cardiovascular:      Rate and Rhythm: Normal rate and regular rhythm.   Pulmonary:      Effort: Pulmonary effort is normal. No respiratory distress.   Abdominal:      Palpations: Abdomen is soft.      Tenderness: There is no abdominal tenderness.   Musculoskeletal:         General: Normal range of motion.      Cervical back: Normal range of motion and neck supple.      Comments: Decreased range of motion right knee, swelling and tenderness noted   Skin:     General: Skin is warm and dry.   Neurological:      General: No focal deficit present.      Mental Status: She is alert. Mental status is at baseline.      Cranial Nerves: No cranial nerve deficit.   Psychiatric:         Mood and Affect: Mood normal.         Behavior: Behavior normal.          Additional Data:     Labs:  Results from last 7 days   Lab Units 24  0440   WBC Thousand/uL 10.76*   HEMOGLOBIN g/dL 11.0*   HEMATOCRIT %  33.4*   PLATELETS Thousands/uL 254   SEGS PCT % 70   LYMPHO PCT % 15   MONO PCT % 14*   EOS PCT % 0     Results from last 7 days   Lab Units 08/16/24  0440   SODIUM mmol/L 136   POTASSIUM mmol/L 2.9*   CHLORIDE mmol/L 95*   CO2 mmol/L 32   BUN mg/dL 6   CREATININE mg/dL 0.49*   ANION GAP mmol/L 9   CALCIUM mg/dL 8.7   ALBUMIN g/dL 3.7   TOTAL BILIRUBIN mg/dL 0.56   ALK PHOS U/L 64   ALT U/L 25   AST U/L 24   GLUCOSE RANDOM mg/dL 109         Results from last 7 days   Lab Units 08/15/24  2055   POC GLUCOSE mg/dl 128               Lines/Drains:  Invasive Devices       Central Venous Catheter Line  Duration             Port A Cath 10/01/19 Left Chest 1781 days              Peripheral Intravenous Line  Duration             Peripheral IV 08/15/24 Left Antecubital <1 day    Peripheral IV 08/15/24 Right Antecubital <1 day                    Central Line:  Goal for removal:  Patient with port in place         Imaging: No pertinent imaging reviewed.    Recent Cultures (last 7 days):   Results from last 7 days   Lab Units 08/15/24  1625   GRAM STAIN RESULT  4+ Polys  1+ Mononuclear Cells  No bacteria seen       Last 24 Hours Medication List:   Current Facility-Administered Medications   Medication Dose Route Frequency Provider Last Rate    acetaminophen  650 mg Oral Q6H PRN Alexx Rios MD      docusate sodium  100 mg Oral BID Alexx Rios MD      fish oil  1,000 mg Oral Daily Alexx Rios MD      HYDROcodone-acetaminophen  1 tablet Oral Q6H PRN Boogie Veliz PA-C      levothyroxine  112 mcg Oral Early Morning Alexx Rios MD      multi-electrolyte  100 mL/hr Intravenous Continuous Sumair Alexx Bazan  mL/hr (08/15/24 1838)    multivitamin stress formula  1 tablet Oral Daily Alexx Rios MD      ondansetron  4 mg Intravenous Q6H PRN Alexx Rios MD      pantoprazole  40 mg Oral Early Morning Alexx Rios MD      potassium chloride  20 mEq Intravenous  Q2H Nisha Bazan MD      potassium phosphate  21 mmol Intravenous Once Nisha Bazan MD      pravastatin  20 mg Oral Daily Alexx Rios MD      venlafaxine  37.5 mg Oral Daily Alexx Rios MD          Today, Patient Was Seen By: Nisha Bazan MD    **Please Note: This note may have been constructed using a voice recognition system.**

## 2024-08-16 NOTE — ANESTHESIA POSTPROCEDURE EVALUATION
Post-Op Assessment Note    CV Status:  Stable  Pain Score: 0    Pain management: adequate       Mental Status:  Sleepy   Hydration Status:  Stable   PONV Controlled:  None   Airway Patency:  Patent     Post Op Vitals Reviewed: Yes    No anethesia notable event occurred.    Staff: CRNA           BP   141/68   Temp   100   Pulse  64   Resp   18   SpO2   97%

## 2024-08-16 NOTE — ASSESSMENT & PLAN NOTE
BP currently stable  Home antihypertensives were held on admission his blood pressure was on the lower side  Can likely resume ARB postoperatively  Continue to hold HCTZ, resume as tolerated

## 2024-08-17 LAB
ANION GAP SERPL CALCULATED.3IONS-SCNC: 10 MMOL/L (ref 4–13)
BASOPHILS # BLD AUTO: 0.01 THOUSANDS/ÂΜL (ref 0–0.1)
BASOPHILS NFR BLD AUTO: 0 % (ref 0–1)
BUN SERPL-MCNC: 8 MG/DL (ref 5–25)
CALCIUM SERPL-MCNC: 8.1 MG/DL (ref 8.4–10.2)
CHLORIDE SERPL-SCNC: 99 MMOL/L (ref 96–108)
CO2 SERPL-SCNC: 28 MMOL/L (ref 21–32)
CREAT SERPL-MCNC: 0.47 MG/DL (ref 0.6–1.3)
EOSINOPHIL # BLD AUTO: 0 THOUSAND/ÂΜL (ref 0–0.61)
EOSINOPHIL NFR BLD AUTO: 0 % (ref 0–6)
ERYTHROCYTE [DISTWIDTH] IN BLOOD BY AUTOMATED COUNT: 12.7 % (ref 11.6–15.1)
GFR SERPL CREATININE-BSD FRML MDRD: 108 ML/MIN/1.73SQ M
GLUCOSE SERPL-MCNC: 120 MG/DL (ref 65–140)
HCT VFR BLD AUTO: 30.7 % (ref 34.8–46.1)
HGB BLD-MCNC: 9.8 G/DL (ref 11.5–15.4)
IMM GRANULOCYTES # BLD AUTO: 0.05 THOUSAND/UL (ref 0–0.2)
IMM GRANULOCYTES NFR BLD AUTO: 1 % (ref 0–2)
LYMPHOCYTES # BLD AUTO: 1.08 THOUSANDS/ÂΜL (ref 0.6–4.47)
LYMPHOCYTES NFR BLD AUTO: 11 % (ref 14–44)
MAGNESIUM SERPL-MCNC: 2.5 MG/DL (ref 1.9–2.7)
MCH RBC QN AUTO: 31 PG (ref 26.8–34.3)
MCHC RBC AUTO-ENTMCNC: 31.9 G/DL (ref 31.4–37.4)
MCV RBC AUTO: 97 FL (ref 82–98)
MONOCYTES # BLD AUTO: 1.2 THOUSAND/ÂΜL (ref 0.17–1.22)
MONOCYTES NFR BLD AUTO: 12 % (ref 4–12)
NEUTROPHILS # BLD AUTO: 7.48 THOUSANDS/ÂΜL (ref 1.85–7.62)
NEUTS SEG NFR BLD AUTO: 76 % (ref 43–75)
NRBC BLD AUTO-RTO: 0 /100 WBCS
PHOSPHATE SERPL-MCNC: 2.6 MG/DL (ref 2.7–4.5)
PLATELET # BLD AUTO: 261 THOUSANDS/UL (ref 149–390)
PMV BLD AUTO: 11.1 FL (ref 8.9–12.7)
POTASSIUM SERPL-SCNC: 3.9 MMOL/L (ref 3.5–5.3)
RBC # BLD AUTO: 3.16 MILLION/UL (ref 3.81–5.12)
SODIUM SERPL-SCNC: 137 MMOL/L (ref 135–147)
WBC # BLD AUTO: 9.82 THOUSAND/UL (ref 4.31–10.16)

## 2024-08-17 PROCEDURE — 86617 LYME DISEASE ANTIBODY: CPT | Performed by: INTERNAL MEDICINE

## 2024-08-17 PROCEDURE — 99024 POSTOP FOLLOW-UP VISIT: CPT | Performed by: PHYSICIAN ASSISTANT

## 2024-08-17 PROCEDURE — 86618 LYME DISEASE ANTIBODY: CPT | Performed by: INTERNAL MEDICINE

## 2024-08-17 PROCEDURE — 84100 ASSAY OF PHOSPHORUS: CPT | Performed by: INTERNAL MEDICINE

## 2024-08-17 PROCEDURE — 80048 BASIC METABOLIC PNL TOTAL CA: CPT | Performed by: INTERNAL MEDICINE

## 2024-08-17 PROCEDURE — 85025 COMPLETE CBC W/AUTO DIFF WBC: CPT | Performed by: INTERNAL MEDICINE

## 2024-08-17 PROCEDURE — 83735 ASSAY OF MAGNESIUM: CPT | Performed by: INTERNAL MEDICINE

## 2024-08-17 PROCEDURE — 99232 SBSQ HOSP IP/OBS MODERATE 35: CPT | Performed by: INTERNAL MEDICINE

## 2024-08-17 RX ADMIN — PANTOPRAZOLE SODIUM 40 MG: 40 TABLET, DELAYED RELEASE ORAL at 05:18

## 2024-08-17 RX ADMIN — ENOXAPARIN SODIUM 40 MG: 40 INJECTION SUBCUTANEOUS at 09:49

## 2024-08-17 RX ADMIN — HYDROCODONE BITARTRATE AND ACETAMINOPHEN 1 TABLET: 5; 325 TABLET ORAL at 09:56

## 2024-08-17 RX ADMIN — LEVOTHYROXINE SODIUM 112 MCG: 112 TABLET ORAL at 05:18

## 2024-08-17 RX ADMIN — PRAVASTATIN SODIUM 20 MG: 20 TABLET ORAL at 09:49

## 2024-08-17 RX ADMIN — SODIUM CHLORIDE, SODIUM GLUCONATE, SODIUM ACETATE, POTASSIUM CHLORIDE, MAGNESIUM CHLORIDE, SODIUM PHOSPHATE, DIBASIC, AND POTASSIUM PHOSPHATE 100 ML/HR: .53; .5; .37; .037; .03; .012; .00082 INJECTION, SOLUTION INTRAVENOUS at 07:22

## 2024-08-17 RX ADMIN — VENLAFAXINE HYDROCHLORIDE 37.5 MG: 37.5 CAPSULE, EXTENDED RELEASE ORAL at 09:49

## 2024-08-17 RX ADMIN — DOCUSATE SODIUM 100 MG: 100 CAPSULE, LIQUID FILLED ORAL at 09:49

## 2024-08-17 RX ADMIN — DOCUSATE SODIUM 100 MG: 100 CAPSULE, LIQUID FILLED ORAL at 17:47

## 2024-08-17 RX ADMIN — HYDROCODONE BITARTRATE AND ACETAMINOPHEN 1 TABLET: 5; 325 TABLET ORAL at 17:47

## 2024-08-17 NOTE — ASSESSMENT & PLAN NOTE
On upadacitinib 15 mg daily.  Will hold in the setting of acute infection  Follow-up as an outpatient

## 2024-08-17 NOTE — PROGRESS NOTES
[facility]  Progress Note  Name: Jessica Larson I  MRN: [id number]  Unit/Bed#: -01 I Date of Admission: 8/15/2024   Date of Service: 8/17/2024 I Hospital Day: 2    Assessment & Plan   * Acute pain of right knee  Assessment & Plan  The patient presented with acute right knee pain for 3 days prior to admission with erythema and swelling. Concern for possible septic arthritis. Right knee was aspirated in the ED-noted purulent material  Continue ceftriaxone and vancomycin  Pain management  PT/OT, orthopedics allows weightbearing as tolerated  Follow-up with cultures, they are currently no growth to date  Orthopedic consult appreciated, I&D yesterday performed and cultures as above    S/P cervical spinal fusion  Assessment & Plan  Noted    Primary hypertension  Assessment & Plan  Blood pressure is acceptable.  Will resume antihypertensives of ARB and HCTZ as blood pressure improves through the hospitalization.  Today we will hold    Postoperative hypothyroidism  Assessment & Plan  Continue levothyroxine    Gastroesophageal reflux disease without esophagitis  Assessment & Plan  Continue PPI    Crohn's disease (HCC)  Assessment & Plan  On upadacitinib 15 mg daily.  Will hold in the setting of acute infection  Follow-up as an outpatient               VTE Pharmacologic Prophylaxis: VTE Score: 1 Moderate Risk (Score 3-4) - Pharmacological DVT Prophylaxis Ordered: enoxaparin (Lovenox).    Mobility:   Basic Mobility Inpatient Raw Score: 22  JH-HLM Goal: 7: Walk 25 feet or more  JH-HLM Achieved: 6: Walk 10 steps or more  JH-HLM Goal NOT achieved. Continue with multidisciplinary rounding and encourage appropriate mobility to improve upon JH-HLM goals.    Patient Centered Rounds: I performed bedside rounds with nursing staff today.   Discussions with Specialists or Other Care Team Provider:  Orthopedics note reviewed      Total Time Spent on Date of Encounter in care of patient: 35 mins. This time was  spent on one or more of the following: performing physical exam; counseling and coordination of care; obtaining or reviewing history; documenting in the medical record; reviewing/ordering tests, medications or procedures; communicating with other healthcare professionals and discussing with patient's family/caregivers.    Current Length of Stay: 2 day(s)  Current Patient Status: Inpatient   Certification Statement: The patient will continue to require additional inpatient hospital stay due to septic arthritis  Discharge Plan: Anticipate discharge in 24-48 hrs to discharge location to be determined pending rehab evaluations.    Code Status: Level 1 - Full Code    Subjective:   She denies fever/chills. She is constipated. She denies n/v. She denies abd pain.  Her knee has some pain but she feels its ok without pain medicine. She understands she is NWB through the weekend and waiting on an ID consult to discuss duration/type of abx and resumption of immunosuppression.    Objective:     Vitals:   Temp (24hrs), Av.6 °F (37 °C), Min:97.5 °F (36.4 °C), Max:100.1 °F (37.8 °C)    Temp:  [97.5 °F (36.4 °C)-100.1 °F (37.8 °C)] 98.9 °F (37.2 °C)  HR:  [59-73] 64  Resp:  [15-22] 18  BP: (106-147)/(63-81) 123/65  SpO2:  [95 %-98 %] 98 %  Body mass index is 18.94 kg/m².     Input and Output Summary (last 24 hours):     Intake/Output Summary (Last 24 hours) at 2024 0742  Last data filed at 2024 0722  Gross per 24 hour   Intake 1741.41 ml   Output --   Net 1741.41 ml       Physical Exam:   Physical Exam  Vitals and nursing note reviewed.   Constitutional:       Appearance: Normal appearance. She is not ill-appearing or diaphoretic.   HENT:      Head: Normocephalic.      Nose: Nose normal. No congestion.      Mouth/Throat:      Mouth: Mucous membranes are moist.      Pharynx: No oropharyngeal exudate.   Eyes:      General: No scleral icterus.     Conjunctiva/sclera: Conjunctivae normal.   Cardiovascular:      Rate and  Rhythm: Normal rate and regular rhythm.   Pulmonary:      Effort: Pulmonary effort is normal. No respiratory distress.      Breath sounds: No wheezing or rales.   Abdominal:      General: Bowel sounds are normal. There is no distension.      Palpations: Abdomen is soft.      Tenderness: There is no abdominal tenderness.   Musculoskeletal:      Cervical back: Normal range of motion and neck supple. No rigidity.      Right lower leg: No edema.      Left lower leg: No edema.   Skin:     General: Skin is warm.      Coloration: Skin is not jaundiced.   Neurological:      Mental Status: She is alert and oriented to person, place, and time.   Psychiatric:         Mood and Affect: Mood normal.         Behavior: Behavior normal.          Additional Data:     Labs:  Results from last 7 days   Lab Units 08/17/24  0422   WBC Thousand/uL 9.82   HEMOGLOBIN g/dL 9.8*   HEMATOCRIT % 30.7*   PLATELETS Thousands/uL 261   SEGS PCT % 76*   LYMPHO PCT % 11*   MONO PCT % 12   EOS PCT % 0     Results from last 7 days   Lab Units 08/17/24  0422 08/16/24  0440   SODIUM mmol/L 137 136   POTASSIUM mmol/L 3.9 2.9*   CHLORIDE mmol/L 99 95*   CO2 mmol/L 28 32   BUN mg/dL 8 6   CREATININE mg/dL 0.47* 0.49*   ANION GAP mmol/L 10 9   CALCIUM mg/dL 8.1* 8.7   ALBUMIN g/dL  --  3.7   TOTAL BILIRUBIN mg/dL  --  0.56   ALK PHOS U/L  --  64   ALT U/L  --  25   AST U/L  --  24   GLUCOSE RANDOM mg/dL 120 109         Results from last 7 days   Lab Units 08/15/24  2055   POC GLUCOSE mg/dl 128               Lines/Drains:  Invasive Devices       Central Venous Catheter Line  Duration             Port A Cath 10/01/19 Left Chest 1781 days              Peripheral Intravenous Line  Duration             Peripheral IV 08/15/24 Left Antecubital 1 day    Peripheral IV 08/15/24 Right Antecubital 1 day                    Central Line:  Goal for removal: N/A - Chronic PICC             Imaging: Reviewed radiology reports from this admission including: xray(s) and CT  lower extremity    Recent Cultures (last 7 days):   Results from last 7 days   Lab Units 08/16/24  1722 08/16/24  1710 08/15/24  1625   GRAM STAIN RESULT  No Polys or Bacteria seen 1+ Polys  No bacteria seen 4+ Polys  1+ Mononuclear Cells  No bacteria seen   BODY FLUID CULTURE, STERILE   --   --  No growth       Last 24 Hours Medication List:   Current Facility-Administered Medications   Medication Dose Route Frequency Provider Last Rate    acetaminophen  650 mg Oral Q6H PRN Alexx Rios MD      docusate sodium  100 mg Oral BID Alexx Rios MD      enoxaparin  40 mg Subcutaneous Daily Boogie Christianson MD      fish oil  1,000 mg Oral Daily Alexx Rios MD      HYDROcodone-acetaminophen  1 tablet Oral Q6H PRN Boogie Veliz PA-C      lactated ringers  125 mL/hr Intravenous Continuous Mac Mandujano MD Stopped (08/16/24 1848)    levothyroxine  112 mcg Oral Early Morning Alexx Rios MD      multi-electrolyte  100 mL/hr Intravenous Continuous Johnathanair Alexx Bazan  mL/hr (08/17/24 0722)    multivitamin stress formula  1 tablet Oral Daily Alexx Rios MD      ondansetron  4 mg Intravenous Q6H PRN Alexx Rios MD      pantoprazole  40 mg Oral Early Morning Alexx Rios MD      pravastatin  20 mg Oral Daily Alexx Rios MD      scopolamine  1 patch Transdermal Once Luke Monroe MD PhD      venlafaxine  37.5 mg Oral Daily Alexx Rios MD          Today, Patient Was Seen By: Abiola Sargent MD    **Please Note: This note may have been constructed using a voice recognition system.**

## 2024-08-17 NOTE — PLAN OF CARE

## 2024-08-17 NOTE — PROGRESS NOTES
Orthopedics   Jessica Larson 59 y.o. female MRN: [id number]  Unit/Bed#: -01      Subjective:  59 y.o.female post operative day 1 right Knee incision and drainage. Pt doing well. Pain controlled. Patient denies any acute complaints.    Labs:  0   Lab Value Date/Time    HCT 30.7 (L) 08/17/2024 0422    HCT 33.4 (L) 08/16/2024 0440    HCT 37.0 08/15/2024 1409    HGB 9.8 (L) 08/17/2024 0422    HGB 11.0 (L) 08/16/2024 0440    HGB 12.4 08/15/2024 1409    WBC 9.82 08/17/2024 0422    WBC 10.76 (H) 08/16/2024 0440    WBC 11.44 (H) 08/15/2024 1409    ESR 66 (H) 08/15/2024 1409    .8 (H) 08/15/2024 1409    CRP 2.9 05/26/2023 1110       Meds:    Current Facility-Administered Medications:     acetaminophen (TYLENOL) tablet 650 mg, 650 mg, Oral, Q6H PRN, Alexx Rios MD    docusate sodium (COLACE) capsule 100 mg, 100 mg, Oral, BID, Alexx Rios MD, 100 mg at 08/17/24 0949    enoxaparin (LOVENOX) subcutaneous injection 40 mg, 40 mg, Subcutaneous, Daily, Boogie Christianson MD, 40 mg at 08/17/24 0949    fish oil capsule 1,000 mg, 1,000 mg, Oral, Daily, Alexx Rios MD    HYDROcodone-acetaminophen (NORCO) 5-325 mg per tablet 1 tablet, 1 tablet, Oral, Q6H PRN, Boogie Veliz PA-C, 1 tablet at 08/17/24 0956    lactated ringers infusion, 125 mL/hr, Intravenous, Continuous, Mac Mandujano MD, Stopped at 08/16/24 1848    levothyroxine tablet 112 mcg, 112 mcg, Oral, Early Morning, Alexx Rios MD, 112 mcg at 08/17/24 0518    multi-electrolyte (PLASMALYTE-A/ISOLYTE-S PH 7.4) IV solution, 100 mL/hr, Intravenous, Continuous, Nisha Bazan MD, Last Rate: 100 mL/hr at 08/17/24 0722, 100 mL/hr at 08/17/24 0722    multivitamin stress formula tablet 1 tablet, 1 tablet, Oral, Daily, Alexx Rios MD    ondansetron (ZOFRAN) injection 4 mg, 4 mg, Intravenous, Q6H PRN, Alexx Rios MD, 4 mg at 08/16/24 1037    pantoprazole (PROTONIX) EC tablet 40 mg, 40 mg, Oral, Early Morning,  "Alexx Rios MD, 40 mg at 08/17/24 0518    pravastatin (PRAVACHOL) tablet 20 mg, 20 mg, Oral, Daily, Alexx Rios MD, 20 mg at 08/17/24 0949    scopolamine (TRANSDERM-SCOP) 1 mg/3 days TD 72 hr patch 1 patch, 1 patch, Transdermal, Once, Luke Monroe MD PhD, 1 patch at 08/16/24 1542    venlafaxine (EFFEXOR-XR) 24 hr capsule 37.5 mg, 37.5 mg, Oral, Daily, Alexx Rios MD, 37.5 mg at 08/17/24 0949    Blood Culture:   No results found for: \"BLOODCX\"    Wound Culture:   No results found for: \"WOUNDCULT\"    Ins and Outs:  I/O last 24 hours:  In: 2041.4 [P.O.:300; I.V.:1741.4]  Out: -           Physical Exam:  Vitals:    08/17/24 1446   BP: 123/72   Pulse: 65   Resp: 18   Temp: 99.2 °F (37.3 °C)   SpO2: 97%     right lower extremity  Dressings c/d/1  Sensation intact  Motor intact to ankle plantarflexion and dorsiflexion, EHL/FHL  2+ dorsalis pedis pulse    Assessment: 59 y.o.female post operative day 1 right knee incision and drainage. Patient doing well    Plan:  Up and out of bed  Nonweight bearing to the right lower extremity until POD 3  PT/OT  IV antibiotics per PRIMARY  PENDING cultures  DVT prophylaxis  Analgesics  D/C planning  Will continue to assess for acute blood loss anemia      Joe Tyson PA-C      "

## 2024-08-17 NOTE — ASSESSMENT & PLAN NOTE
Blood pressure is acceptable.  Will resume antihypertensives of ARB and HCTZ as blood pressure improves through the hospitalization.  Today we will hold

## 2024-08-17 NOTE — ASSESSMENT & PLAN NOTE
The patient presented with acute right knee pain for 3 days prior to admission with erythema and swelling. Concern for possible septic arthritis. Right knee was aspirated in the ED-noted purulent material  Continue ceftriaxone and vancomycin  Pain management  PT/OT, orthopedics allows weightbearing as tolerated  Follow-up with cultures, they are currently no growth to date  Orthopedic consult appreciated, I&D yesterday performed and cultures as above

## 2024-08-18 LAB
ANION GAP SERPL CALCULATED.3IONS-SCNC: 7 MMOL/L (ref 4–13)
B BURGDOR IGG SERPL QL IA: POSITIVE
B BURGDOR IGG+IGM SER QL IA: POSITIVE
B BURGDOR IGM SERPL QL IA: ABNORMAL
BASOPHILS # BLD AUTO: 0.04 THOUSANDS/ÂΜL (ref 0–0.1)
BASOPHILS NFR BLD AUTO: 0 % (ref 0–1)
BUN SERPL-MCNC: 8 MG/DL (ref 5–25)
CALCIUM SERPL-MCNC: 8.1 MG/DL (ref 8.4–10.2)
CHLORIDE SERPL-SCNC: 102 MMOL/L (ref 96–108)
CO2 SERPL-SCNC: 28 MMOL/L (ref 21–32)
CREAT SERPL-MCNC: 0.52 MG/DL (ref 0.6–1.3)
EOSINOPHIL # BLD AUTO: 0.04 THOUSAND/ÂΜL (ref 0–0.61)
EOSINOPHIL NFR BLD AUTO: 0 % (ref 0–6)
ERYTHROCYTE [DISTWIDTH] IN BLOOD BY AUTOMATED COUNT: 12.9 % (ref 11.6–15.1)
GFR SERPL CREATININE-BSD FRML MDRD: 104 ML/MIN/1.73SQ M
GLUCOSE SERPL-MCNC: 96 MG/DL (ref 65–140)
HCT VFR BLD AUTO: 32.4 % (ref 34.8–46.1)
HGB BLD-MCNC: 10.3 G/DL (ref 11.5–15.4)
IMM GRANULOCYTES # BLD AUTO: 0.03 THOUSAND/UL (ref 0–0.2)
IMM GRANULOCYTES NFR BLD AUTO: 0 % (ref 0–2)
LYMPHOCYTES # BLD AUTO: 2.52 THOUSANDS/ÂΜL (ref 0.6–4.47)
LYMPHOCYTES NFR BLD AUTO: 28 % (ref 14–44)
MCH RBC QN AUTO: 30.7 PG (ref 26.8–34.3)
MCHC RBC AUTO-ENTMCNC: 31.8 G/DL (ref 31.4–37.4)
MCV RBC AUTO: 97 FL (ref 82–98)
MONOCYTES # BLD AUTO: 1 THOUSAND/ÂΜL (ref 0.17–1.22)
MONOCYTES NFR BLD AUTO: 11 % (ref 4–12)
NEUTROPHILS # BLD AUTO: 5.46 THOUSANDS/ÂΜL (ref 1.85–7.62)
NEUTS SEG NFR BLD AUTO: 61 % (ref 43–75)
NRBC BLD AUTO-RTO: 0 /100 WBCS
PLATELET # BLD AUTO: 296 THOUSANDS/UL (ref 149–390)
PMV BLD AUTO: 10.6 FL (ref 8.9–12.7)
POTASSIUM SERPL-SCNC: 3.8 MMOL/L (ref 3.5–5.3)
RBC # BLD AUTO: 3.35 MILLION/UL (ref 3.81–5.12)
SODIUM SERPL-SCNC: 137 MMOL/L (ref 135–147)
WBC # BLD AUTO: 9.09 THOUSAND/UL (ref 4.31–10.16)

## 2024-08-18 PROCEDURE — 80048 BASIC METABOLIC PNL TOTAL CA: CPT | Performed by: INTERNAL MEDICINE

## 2024-08-18 PROCEDURE — 99232 SBSQ HOSP IP/OBS MODERATE 35: CPT | Performed by: INTERNAL MEDICINE

## 2024-08-18 PROCEDURE — 85025 COMPLETE CBC W/AUTO DIFF WBC: CPT | Performed by: INTERNAL MEDICINE

## 2024-08-18 PROCEDURE — 99024 POSTOP FOLLOW-UP VISIT: CPT | Performed by: PHYSICIAN ASSISTANT

## 2024-08-18 RX ORDER — DOXYCYCLINE 100 MG/1
100 CAPSULE ORAL EVERY 12 HOURS SCHEDULED
Status: DISCONTINUED | OUTPATIENT
Start: 2024-08-18 | End: 2024-08-19 | Stop reason: HOSPADM

## 2024-08-18 RX ORDER — LOSARTAN POTASSIUM 50 MG/1
50 TABLET ORAL DAILY
Status: DISCONTINUED | OUTPATIENT
Start: 2024-08-18 | End: 2024-08-19 | Stop reason: HOSPADM

## 2024-08-18 RX ADMIN — ENOXAPARIN SODIUM 40 MG: 40 INJECTION SUBCUTANEOUS at 08:04

## 2024-08-18 RX ADMIN — LEVOTHYROXINE SODIUM 112 MCG: 112 TABLET ORAL at 05:02

## 2024-08-18 RX ADMIN — DOCUSATE SODIUM 100 MG: 100 CAPSULE, LIQUID FILLED ORAL at 17:38

## 2024-08-18 RX ADMIN — DOCUSATE SODIUM 100 MG: 100 CAPSULE, LIQUID FILLED ORAL at 08:04

## 2024-08-18 RX ADMIN — LOSARTAN POTASSIUM 50 MG: 50 TABLET, FILM COATED ORAL at 11:53

## 2024-08-18 RX ADMIN — HYDROCODONE BITARTRATE AND ACETAMINOPHEN 1 TABLET: 5; 325 TABLET ORAL at 17:54

## 2024-08-18 RX ADMIN — PRAVASTATIN SODIUM 20 MG: 20 TABLET ORAL at 08:04

## 2024-08-18 RX ADMIN — HYDROCODONE BITARTRATE AND ACETAMINOPHEN 1 TABLET: 5; 325 TABLET ORAL at 04:39

## 2024-08-18 RX ADMIN — HYDROCODONE BITARTRATE AND ACETAMINOPHEN 1 TABLET: 5; 325 TABLET ORAL at 11:52

## 2024-08-18 RX ADMIN — DOXYCYCLINE HYCLATE 100 MG: 100 CAPSULE ORAL at 21:40

## 2024-08-18 RX ADMIN — VENLAFAXINE HYDROCHLORIDE 37.5 MG: 37.5 CAPSULE, EXTENDED RELEASE ORAL at 08:04

## 2024-08-18 RX ADMIN — PANTOPRAZOLE SODIUM 40 MG: 40 TABLET, DELAYED RELEASE ORAL at 05:02

## 2024-08-18 NOTE — PROGRESS NOTES
[facility]  Progress Note  Name: Jessica Larson I  MRN: [id number]  Unit/Bed#: -01 I Date of Admission: 8/15/2024   Date of Service: 8/18/2024 I Hospital Day: 3    Assessment & Plan   * Acute pain of right knee  Assessment & Plan  The patient presented with acute right knee pain for 3 days prior to admission with erythema and swelling. Concern for possible septic arthritis. Right knee was aspirated in the ED-noted purulent material  Continue ceftriaxone and vancomycin day 4, ID consulted for official duration of treatment likely to be seen on August 19  Pain management  PT/OT, orthopedics allows weightbearing as tolerated on postop day 3, today's postop day 2  Follow-up with cultures, they are currently no growth to date  Orthopedic consult appreciated, I&D yesterday performed and cultures as above    Primary hypertension  Assessment & Plan  Blood pressure is acceptable.  Will resume antihypertensives of ARB and HCTZ as blood pressure improves through the hospitalization.  Today we will hold    Postoperative hypothyroidism  Assessment & Plan  Continue levothyroxine    Gastroesophageal reflux disease without esophagitis  Assessment & Plan  Continue PPI    Crohn's disease (HCC)  Assessment & Plan  On upadacitinib 15 mg daily.  Will hold in the setting of acute infection  Follow-up as an outpatient               VTE Pharmacologic Prophylaxis: VTE Score: 1 Moderate Risk (Score 3-4) - Pharmacological DVT Prophylaxis Ordered: enoxaparin (Lovenox).    Mobility:   Basic Mobility Inpatient Raw Score: 20  JH-HLM Goal: 6: Walk 10 steps or more  JH-HLM Achieved: 7: Walk 25 feet or more  JH-HLM Goal achieved. Continue to encourage appropriate mobility.    Patient Centered Rounds: I performed bedside rounds with nursing staff today.   Discussions with Specialists or Other Care Team Provider: orthopedics notes reviewed    Education and Discussions with Family / Patient: Patient declined call to contact  person.     Total Time Spent on Date of Encounter in care of patient: 35 mins. This time was spent on one or more of the following: performing physical exam; counseling and coordination of care; obtaining or reviewing history; documenting in the medical record; reviewing/ordering tests, medications or procedures; communicating with other healthcare professionals and discussing with patient's family/caregivers.    Current Length of Stay: 3 day(s)  Current Patient Status: Inpatient   Certification Statement: The patient will continue to require additional inpatient hospital stay due to septic arthritis workup  Discharge Plan: Anticipate discharge in 24-48 hrs to home.    Code Status: Level 1 - Full Code    Subjective:   Patient reports having a good night.  She has no fevers or chills.  She has some pain in her knee which is improving.  She denies chest pain or shortness of breath.  She denies abdominal pain.    We discussed orthopedics recommendations for nonweightbearing until tomorrow.  Orthopedics also left a note that antibiotics are up to the primary team for this presumed septic arthritis of which we discussed the pending infectious disease consult.  She is hopeful to go home soon as she can and understands the plan might not be straightforward as the cultures are currently no growth but she had a significant amount of white blood cells on the initial fluid smear    Objective:     Vitals:   Temp (24hrs), Av °F (37.2 °C), Min:98.8 °F (37.1 °C), Max:99.2 °F (37.3 °C)    Temp:  [98.8 °F (37.1 °C)-99.2 °F (37.3 °C)] 98.8 °F (37.1 °C)  HR:  [60-68] 68  Resp:  [18] 18  BP: (112-125)/(68-73) 125/73  SpO2:  [97 %-99 %] 98 %  Body mass index is 18.94 kg/m².     Input and Output Summary (last 24 hours):     Intake/Output Summary (Last 24 hours) at 2024 0958  Last data filed at 2024 1750  Gross per 24 hour   Intake 120 ml   Output --   Net 120 ml       Physical Exam:   Physical Exam  Vitals and nursing note  reviewed.   Constitutional:       Appearance: Normal appearance. She is not ill-appearing or diaphoretic.   HENT:      Head: Normocephalic and atraumatic.      Nose: Nose normal. No congestion.      Mouth/Throat:      Mouth: Mucous membranes are moist.      Pharynx: No oropharyngeal exudate.   Eyes:      General: No scleral icterus.     Conjunctiva/sclera: Conjunctivae normal.   Abdominal:      General: Bowel sounds are normal. There is no distension.      Palpations: Abdomen is soft.      Tenderness: There is no abdominal tenderness.   Musculoskeletal:      Cervical back: Normal range of motion and neck supple. No rigidity.   Skin:     Comments: See picture of knee in chart   Neurological:      Mental Status: She is alert and oriented to person, place, and time.   Psychiatric:         Mood and Affect: Mood normal.         Behavior: Behavior normal.          Additional Data:     Labs:  Results from last 7 days   Lab Units 08/18/24  0440   WBC Thousand/uL 9.09   HEMOGLOBIN g/dL 10.3*   HEMATOCRIT % 32.4*   PLATELETS Thousands/uL 296   SEGS PCT % 61   LYMPHO PCT % 28   MONO PCT % 11   EOS PCT % 0     Results from last 7 days   Lab Units 08/18/24  0440 08/17/24  0422 08/16/24  0440   SODIUM mmol/L 137   < > 136   POTASSIUM mmol/L 3.8   < > 2.9*   CHLORIDE mmol/L 102   < > 95*   CO2 mmol/L 28   < > 32   BUN mg/dL 8   < > 6   CREATININE mg/dL 0.52*   < > 0.49*   ANION GAP mmol/L 7   < > 9   CALCIUM mg/dL 8.1*   < > 8.7   ALBUMIN g/dL  --   --  3.7   TOTAL BILIRUBIN mg/dL  --   --  0.56   ALK PHOS U/L  --   --  64   ALT U/L  --   --  25   AST U/L  --   --  24   GLUCOSE RANDOM mg/dL 96   < > 109    < > = values in this interval not displayed.         Results from last 7 days   Lab Units 08/15/24  2055   POC GLUCOSE mg/dl 128               Lines/Drains:  Invasive Devices       Central Venous Catheter Line  Duration             Port A Cath 10/01/19 Left Chest 1783 days              Peripheral Intravenous Line  Duration              Peripheral IV 08/15/24 Right Antecubital 2 days                    Central Line:  Goal for removal: N/A - Chronic PICC             Imaging: No pertinent imaging reviewed.    Recent Cultures (last 7 days):   Results from last 7 days   Lab Units 08/16/24  1722 08/16/24  1710 08/15/24  1625   GRAM STAIN RESULT  No Polys or Bacteria seen 1+ Polys  No bacteria seen 4+ Polys  1+ Mononuclear Cells  No bacteria seen   BODY FLUID CULTURE, STERILE   --   --  No growth       Last 24 Hours Medication List:   Current Facility-Administered Medications   Medication Dose Route Frequency Provider Last Rate    acetaminophen  650 mg Oral Q6H PRN Alexx Rios MD      docusate sodium  100 mg Oral BID Alexx Rios MD      enoxaparin  40 mg Subcutaneous Daily Boogie Christianson MD      fish oil  1,000 mg Oral Daily Alexx Rios MD      HYDROcodone-acetaminophen  1 tablet Oral Q6H PRN Boogie Veliz PA-C      levothyroxine  112 mcg Oral Early Morning Alexx Rios MD      losartan  50 mg Oral Daily Abiola Sargent MD      multi-electrolyte  100 mL/hr Intravenous Continuous Sumair Alexx Bazan  mL/hr (08/17/24 0722)    multivitamin stress formula  1 tablet Oral Daily Alexx Rios MD      ondansetron  4 mg Intravenous Q6H PRN Alexx Rios MD      pantoprazole  40 mg Oral Early Morning Alexx Rios MD      pravastatin  20 mg Oral Daily Alexx Rios MD      scopolamine  1 patch Transdermal Once Luke Toro Monroe MD PhD      venlafaxine  37.5 mg Oral Daily Alexx Rios MD          Today, Patient Was Seen By: Abiola Sargent MD    **Please Note: This note may have been constructed using a voice recognition system.**

## 2024-08-18 NOTE — PROGRESS NOTES
Orthopedics   Jessica Larson 59 y.o. female MRN: [id number]  Unit/Bed#: -01      Subjective:  59 y.o.female post operative day 2 right Knee incision and drainage. Pt doing well. Pain controlled. Patient denies any acute complaints. She was able to get up more easily today.    Labs:  0   Lab Value Date/Time    HCT 32.4 (L) 08/18/2024 0440    HCT 30.7 (L) 08/17/2024 0422    HCT 33.4 (L) 08/16/2024 0440    HGB 10.3 (L) 08/18/2024 0440    HGB 9.8 (L) 08/17/2024 0422    HGB 11.0 (L) 08/16/2024 0440    WBC 9.09 08/18/2024 0440    WBC 9.82 08/17/2024 0422    WBC 10.76 (H) 08/16/2024 0440    ESR 66 (H) 08/15/2024 1409    .8 (H) 08/15/2024 1409    CRP 2.9 05/26/2023 1110       Meds:    Current Facility-Administered Medications:     acetaminophen (TYLENOL) tablet 650 mg, 650 mg, Oral, Q6H PRN, Alexx Rios MD    docusate sodium (COLACE) capsule 100 mg, 100 mg, Oral, BID, Alexx Rios MD, 100 mg at 08/18/24 0804    enoxaparin (LOVENOX) subcutaneous injection 40 mg, 40 mg, Subcutaneous, Daily, Boogie Christianson MD, 40 mg at 08/18/24 0804    fish oil capsule 1,000 mg, 1,000 mg, Oral, Daily, Alexx Rios MD    HYDROcodone-acetaminophen (NORCO) 5-325 mg per tablet 1 tablet, 1 tablet, Oral, Q6H PRN, Boogie Veliz PA-C, 1 tablet at 08/18/24 1152    levothyroxine tablet 112 mcg, 112 mcg, Oral, Early Morning, Alexx Rios MD, 112 mcg at 08/18/24 0502    losartan (COZAAR) tablet 50 mg, 50 mg, Oral, Daily, Abiola Sargent MD, 50 mg at 08/18/24 1153    multivitamin stress formula tablet 1 tablet, 1 tablet, Oral, Daily, Alexx Rios MD    ondansetron (ZOFRAN) injection 4 mg, 4 mg, Intravenous, Q6H PRN, Alexx Rios MD, 4 mg at 08/16/24 1037    pantoprazole (PROTONIX) EC tablet 40 mg, 40 mg, Oral, Early Morning, Alexx Rios MD, 40 mg at 08/18/24 0502    pravastatin (PRAVACHOL) tablet 20 mg, 20 mg, Oral, Daily, Alexx Rios MD, 20 mg at 08/18/24 0804     "scopolamine (TRANSDERM-SCOP) 1 mg/3 days TD 72 hr patch 1 patch, 1 patch, Transdermal, Once, Luke Monroe MD PhD, 1 patch at 08/16/24 1542    venlafaxine (EFFEXOR-XR) 24 hr capsule 37.5 mg, 37.5 mg, Oral, Daily, Alexx Rios MD, 37.5 mg at 08/18/24 0804    Blood Culture:   No results found for: \"BLOODCX\"    Wound Culture:   No results found for: \"WOUNDCULT\"    Ins and Outs:  I/O last 24 hours:  In: 1560 [P.O.:660; I.V.:900]  Out: -           Physical Exam:  Vitals:    08/18/24 1153   BP: 168/83   Pulse: 74   Resp:    Temp:    SpO2: 98%     right lower extremity  Incisions well appearing. No erythema or drainage  Sensation intact  Motor intact to ankle plantarflexion and dorsiflexion, EHL/FHL  2+ dorsalis pedis pulse    Assessment: 59 y.o.female post operative day 2 right knee incision and drainage. Patient doing well    Plan:  Up and out of bed  Nonweight bearing to the right lower extremity until tomorrow  PT/OT  Medical management of knee per primary  PENDING cultures from OR  DVT prophylaxis  Analgesics  D/C planning  Patient noted to have acute blood loss anemia due to a drop in Hbg of > 2.0g from preop levels, will monitor vital signs and resuscitate with IV fluids as needed      Joe Tyson PA-C      "

## 2024-08-18 NOTE — ASSESSMENT & PLAN NOTE
The patient presented with acute right knee pain for 3 days prior to admission with erythema and swelling. Concern for possible septic arthritis. Right knee was aspirated in the ED-noted purulent material  Continue ceftriaxone and vancomycin day 4, ID consulted for official duration of treatment likely to be seen on August 19  Pain management  PT/OT, orthopedics allows weightbearing as tolerated on postop day 3, today's postop day 2  Follow-up with cultures, they are currently no growth to date  Orthopedic consult appreciated, I&D yesterday performed and cultures as above

## 2024-08-18 NOTE — QUICK NOTE
Attending addendum    Patient was noted to be Lyme positive with an equivocal IgM.  Discussed with the patient the potential Lyme diagnosis, she has never had Lyme disease in the past.  I will start empiric treatment with doxycycline.  Called the microbiology lab and did add a Lyme PCR to the Syvonial of fluid that was collected on August 15.    MARCO

## 2024-08-18 NOTE — ASSESSMENT & PLAN NOTE
The patient presented with acute right knee pain for 3 days prior to admission with erythema and swelling. Concern for possible septic arthritis. Right knee was aspirated in the ED-noted purulent material  Continue ceftriaxone and vancomycin day 4, ID consulted for official duration of treatment likely to be seen on August 19  Pain management  PT/OT, orthopedics   Follow-up with cultures, they are currently no growth to date  Orthopedic consult appreciated, I&D yesterday performed and cultures as above  Lyme disease positive-ID now on board-continue doxycycline  Stable for discharge-will require 28 days of doxycycline and ID will follow-up outpatient

## 2024-08-18 NOTE — PLAN OF CARE

## 2024-08-19 ENCOUNTER — TELEPHONE (OUTPATIENT)
Age: 59
End: 2024-08-19

## 2024-08-19 VITALS
RESPIRATION RATE: 14 BRPM | HEART RATE: 57 BPM | TEMPERATURE: 98.1 F | OXYGEN SATURATION: 97 % | WEIGHT: 97 LBS | HEIGHT: 60 IN | SYSTOLIC BLOOD PRESSURE: 132 MMHG | DIASTOLIC BLOOD PRESSURE: 79 MMHG | BODY MASS INDEX: 19.04 KG/M2

## 2024-08-19 PROBLEM — M25.561 ACUTE PAIN OF RIGHT KNEE: Status: RESOLVED | Noted: 2024-08-15 | Resolved: 2024-08-19

## 2024-08-19 PROCEDURE — 99239 HOSP IP/OBS DSCHRG MGMT >30: CPT

## 2024-08-19 PROCEDURE — 99222 1ST HOSP IP/OBS MODERATE 55: CPT | Performed by: INTERNAL MEDICINE

## 2024-08-19 PROCEDURE — 99024 POSTOP FOLLOW-UP VISIT: CPT | Performed by: ORTHOPAEDIC SURGERY

## 2024-08-19 PROCEDURE — 97166 OT EVAL MOD COMPLEX 45 MIN: CPT

## 2024-08-19 RX ORDER — DOXYCYCLINE 100 MG/1
100 CAPSULE ORAL EVERY 12 HOURS SCHEDULED
Qty: 54 CAPSULE | Refills: 0 | Status: SHIPPED | OUTPATIENT
Start: 2024-08-19 | End: 2024-09-15

## 2024-08-19 RX ORDER — HYDROCODONE BITARTRATE AND ACETAMINOPHEN 5; 325 MG/1; MG/1
1 TABLET ORAL EVERY 6 HOURS PRN
Qty: 10 TABLET | Refills: 0 | Status: SHIPPED | OUTPATIENT
Start: 2024-08-19

## 2024-08-19 RX ADMIN — VENLAFAXINE HYDROCHLORIDE 37.5 MG: 37.5 CAPSULE, EXTENDED RELEASE ORAL at 09:30

## 2024-08-19 RX ADMIN — PANTOPRAZOLE SODIUM 40 MG: 40 TABLET, DELAYED RELEASE ORAL at 05:13

## 2024-08-19 RX ADMIN — HYDROCODONE BITARTRATE AND ACETAMINOPHEN 1 TABLET: 5; 325 TABLET ORAL at 09:30

## 2024-08-19 RX ADMIN — DOXYCYCLINE HYCLATE 100 MG: 100 CAPSULE ORAL at 09:30

## 2024-08-19 RX ADMIN — HYDROCODONE BITARTRATE AND ACETAMINOPHEN 1 TABLET: 5; 325 TABLET ORAL at 03:17

## 2024-08-19 RX ADMIN — ENOXAPARIN SODIUM 40 MG: 40 INJECTION SUBCUTANEOUS at 09:31

## 2024-08-19 RX ADMIN — LEVOTHYROXINE SODIUM 112 MCG: 112 TABLET ORAL at 05:13

## 2024-08-19 RX ADMIN — LOSARTAN POTASSIUM 50 MG: 50 TABLET, FILM COATED ORAL at 09:30

## 2024-08-19 RX ADMIN — DOCUSATE SODIUM 100 MG: 100 CAPSULE, LIQUID FILLED ORAL at 09:30

## 2024-08-19 RX ADMIN — PRAVASTATIN SODIUM 20 MG: 20 TABLET ORAL at 09:30

## 2024-08-19 NOTE — NURSING NOTE
Discharge instructions reviewed with patient and patient's spouse via [facility] nurse.    Patient discharged home with spouse. Walker provided by case management.

## 2024-08-19 NOTE — PLAN OF CARE

## 2024-08-19 NOTE — CASE MANAGEMENT
Case Management Discharge Planning Note    Patient name Jessica Larson  Location /-01 MRN [id number]  : 1965 Date 2024       Current Admission Date: 8/15/2024  Current Admission Diagnosis:Crohn's disease (HCC)   Patient Active Problem List    Diagnosis Date Noted Date Diagnosed    S/P cervical spinal fusion 2024     PONV (postoperative nausea and vomiting) 2024     Primary hypertension 08/15/2024     Elevated fasting glucose 2024     Adrenal adenoma 2023 10/03/2023    Gastroesophageal reflux disease without esophagitis 2023     Achilles tendinitis of left lower extremity 2022     Anorexia symptom 2022     Cancer (HCC)      Hiatal hernia      Sleep disorder 2019     Encounter for care related to vascular access port 10/18/2019     Crohn's disease (HCC) 10/02/2019     Osteoporosis 2019     Degeneration of thoracic intervertebral disc 2019     Menopausal flushing 2019     Biliary dyskinesia 2018     Gallbladder polyp 2018     Chronic neck pain 2018     Benign neoplasm of colon 2018     Cobalamin deficiency 2018     Crohn's disease of colon (HCC) 2018     Diverticular disease of colon 2018     History of colonic polyps 2018     Internal hemorrhoids without complication 2018     GEETHA III (vulvar intraepithelial neoplasia III) 2016     Malignant tumor of thyroid gland (HCC) 2010     Postoperative hypothyroidism 2010       LOS (days): 4  Geometric Mean LOS (GMLOS) (days): 5  Days to GMLOS:1.1     OBJECTIVE:  Risk of Unplanned Readmission Score: 12.1         Current admission status: Inpatient   Preferred Pharmacy:   [facility] - [place], [place] - [address]  [address] [address]  Phone: [phone] Fax: [phone]    [facility] [address] [place] [address] [address]  [address] [place] [address]  Phone: [phone] Fax: [phone]    Primary Care Provider: Nicola Alonso MD    Primary Insurance: MEDICARE  Secondary Insurance: AET[employer]    DISCHARGE DETAILS:                                     DME Referral Provided  Referral made for DME?: Yes  DME referral completed for the following items:: Walker    Other Referral/Resources/Interventions Provided:  Interventions: DME  Referral Comments: CM was notified by PT/OT that pt will need rolling walker. CM submitted order for RW via [place]. Upon approval, CM provided pt with RW, and obtained pt signature on delivery ticket. CM will continue to follow.                                             IMM Given (Date):: 08/19/24  IMM Given to:: Patient     Additional Comments: CM met with pt at bedside to review IMM and obtained pt signature.

## 2024-08-19 NOTE — DISCHARGE SUMMARY
[facility]  Progress Note  Name: Jessica Larson I  MRN: [id number]  Unit/Bed#: -01 I Date of Admission: 8/15/2024   Date of Service: 8/19/2024 I Hospital Day: 4    Assessment & Plan   S/P cervical spinal fusion  Assessment & Plan  Noted    Primary hypertension  Assessment & Plan  Blood pressure is acceptable.  Continue home anti-hypertensives    Postoperative hypothyroidism  Assessment & Plan  Continue levothyroxine    Gastroesophageal reflux disease without esophagitis  Assessment & Plan  Continue PPI    Crohn's disease (HCC)  Assessment & Plan  On upadacitinib 15 mg daily.  Will hold in the setting of acute infection  Will continue to hold-discussed with patient to contact GI team to inquire on setting to restart medication    * Acute pain of right knee  Assessment & Plan  The patient presented with acute right knee pain for 3 days prior to admission with erythema and swelling. Concern for possible septic arthritis. Right knee was aspirated in the ED-noted purulent material  Continue ceftriaxone and vancomycin day 4, ID consulted for official duration of treatment likely to be seen on August 19  Pain management  PT/OT, orthopedics   Follow-up with cultures, they are currently no growth to date  Orthopedic consult appreciated, I&D yesterday performed and cultures as above  Lyme disease positive-ID now on board-continue doxycycline  Stable for discharge-will require 28 days of doxycycline and ID will follow-up outpatient           Medical Problems       Resolved Problems  Date Reviewed: 8/19/2024   None       Discharging Physician / Practitioner: Serafin Brown MD  PCP: Nicola Alonso MD  Admission Date:   Admission Orders (From admission, onward)       Ordered        08/15/24 1801  INPATIENT ADMISSION  Once                          Discharge Date: 08/19/24    Consultations During Hospital Stay:  Ortho  ID    Procedures Performed:   I/D of R knee    Significant Findings / Test Results:    Found to have Lyme IgM and IgG    Incidental Findings:   N/A    Test Results Pending at Discharge (will require follow up):   Lyme PCR-follow up with ID     Outpatient Tests Requested:  N/A    Complications:  none    Reason for Admission: knee pain    Hospital Course:   Jessica Larson is a 59 y.o. female patient who originally presented to the hospital on 8/15/2024 due to acute right knee pain for 3 days.  Concern for possible septic arthritis in right knee was aspirated.  Patient was originally on ceftriaxone and vancomycin for 4 days and ID was consulted.  Further workup included a positive Lyme IgM and IgG with PCR pending.  Due to these findings ID recommended doxycycline 100 twice daily for 28 days and will follow-up PCR outpatient.  Of note, patient does have a history of Crohn's disease and is on a Randell inhibitor.  This will be held for now and patient will discuss with GI/ID team on initiation of medication.  Was discussed with patient and she stated understanding.        Please see above list of diagnoses and related plan for additional information.     Condition at Discharge: fair    Discharge Day Visit / Exam:   Subjective: Mild uncomfortableness in right knee  Vitals: Blood Pressure: 132/79 (08/19/24 0727)  Pulse: 57 (08/19/24 0727)  Temperature: 98.1 °F (36.7 °C) (08/19/24 0727)  Temp Source: Oral (08/19/24 0727)  Respirations: 14 (08/19/24 0727)  Height: 5' (152.4 cm) (08/16/24 1511)  Weight - Scale: 44 kg (97 lb) (08/16/24 1511)  SpO2: 97 % (08/19/24 0727)  Exam:   Physical Exam  Vitals reviewed.   Constitutional:       Appearance: Normal appearance.   HENT:      Head: Normocephalic and atraumatic.   Cardiovascular:      Rate and Rhythm: Normal rate and regular rhythm.   Pulmonary:      Effort: Pulmonary effort is normal.      Breath sounds: Normal breath sounds.   Abdominal:      General: Abdomen is flat. Bowel sounds are normal.   Musculoskeletal:      Right lower leg: No edema.      Left lower leg:  No edema.   Skin:     General: Skin is warm and dry.   Neurological:      Mental Status: She is alert and oriented to person, place, and time.   Psychiatric:         Mood and Affect: Mood normal.         Behavior: Behavior normal.          Discussion with Family: Patient declined call to .     Discharge instructions/Information to patient and family:   See after visit summary for information provided to patient and family.      Provisions for Follow-Up Care:  See after visit summary for information related to follow-up care and any pertinent home health orders.      Mobility at time of Discharge:   Basic Mobility Inpatient Raw Score: 22  JH-HLM Goal: 7: Walk 25 feet or more  JH-HLM Achieved: 7: Walk 25 feet or more  HLM Goal achieved. Continue to encourage appropriate mobility.     Disposition:   Home    Planned Readmission: N/A     Discharge Statement:  I spent 35 minutes discharging the patient. This time was spent on the day of discharge. I had direct contact with the patient on the day of discharge. Greater than 50% of the total time was spent examining patient, answering all patient questions, arranging and discussing plan of care with patient as well as directly providing post-discharge instructions.  Additional time then spent on discharge activities.    Discharge Medications:  See after visit summary for reconciled discharge medications provided to patient and/or family.      **Please Note: This note may have been constructed using a voice recognition system**

## 2024-08-19 NOTE — PROGRESS NOTES
"Progress Note - Orthopedics   Jessica Larson 59 y.o. female MRN: [id number]  Unit/Bed#: -Fanny Encounter: [id number]    Assessment:  Postop day #2, status post arthroscopic lavage for possible septic arthritis    Newly diagnosed Lyme arthritis    Plan:  After review of the intraoperative cultures, they were negative.  The patient had a Lyme titer done on Saturday which was positive for Lyme.  At this point, would recommend the typical regiment that is 28 days of oral antibiotics.  The patient feels much better since the surgery.  Continue home exercise program.  Antibiotics as per primary service or infectious disease.  Follow-up with Dr. Christianson in 2 weeks.  The patient is already discharged    Weight bearing: As tolerated    VTE Pharmacologic Prophylaxis: Sequential compression device (Venodyne)   VTE Mechanical Prophylaxis: sequential compression device    Subjective: Patient is ambulating from the bathroom in no apparent distress    Vitals: Blood pressure 132/79, pulse 57, temperature 98.1 °F (36.7 °C), temperature source Oral, resp. rate 14, height 5' (1.524 m), weight 44 kg (97 lb), SpO2 97%.,Body mass index is 18.94 kg/m².      Intake/Output Summary (Last 24 hours) at 8/19/2024 1446  Last data filed at 8/19/2024 0800  Gross per 24 hour   Intake 240 ml   Output --   Net 240 ml       Invasive Devices       Central Venous Catheter Line  Duration             Port A Cath 10/01/19 Left Chest 1784 days                    Physical Exam:   Ortho Exam:     Right lower extremity is neurovascularly intact.  Toes are pink and mobile.  Compartments are soft.  Incisions are clean, dry, intact.  Minimal swelling.  No warmth erythema.  No drainage.  Arc of motion is improved.  Negative Homans    Lab, Imaging and other studies: CBC: No results found for: \"WBC\", \"HGB\", \"HCT\", \"MCV\", \"PLT\", \"ADJUSTEDWBC\", \"RBC\", \"MCH\", \"MCHC\", \"RDW\", \"MPV\", \"NRBC\"  CMP: No results found for: \"SODIUM\", \"CL\", \"CO2\", \"ANIONGAP\", \"BUN\", " "\"CREATININE\", \"GLUCOSE\", \"CALCIUM\", \"AST\", \"ALT\", \"ALKPHOS\", \"PROT\", \"BILITOT\", \"EGFR\"  PT/INR: No results found for: \"PT\", \"INR\"  Lyme titer is positive      "

## 2024-08-19 NOTE — TELEPHONE ENCOUNTER
Prescriptions    Pt calling for meds not listed on DC Summary. Connected patient to [facility] to discuss medicaitns with hospitalist.

## 2024-08-19 NOTE — CONSULTS
Consultation - [facility] Infectious Disease   Jessica GROVER Marsha 59 y.o. female MRN: [id number]  Unit/Bed#: -Fanny Encounter: [id number]      IMPRESSION & RECOMMENDATIONS:   1.  Suspected right knee septic arthritis.  Patient presented with acute right knee pain, swelling and erythema.  Status post bedside arthrocentesis in the ER 8/15 with turbid, purulent fluid with over 140,000 WBCs with neutrophil predominance.  Crystals negative. Fluid not sent for RBCs. Appreciate Ortho input.  Status post arthroscopic I&D 8/16 with cloudy turbid synovial fluid and inflamed synovium.  Cultures finalized negative for growth.  Pathology pending. Consider possibility of late disseminated lyme disease with joint manifestation. Synovial fluid WBCs higher than typically seen with lyme arthritis, which is usually a relapsing painless arthritis. Consider possibility that WBC in synovial fluid is reactive due to traumatic tap, but fluid not sent for RBCs.   -continue doxy 100mg BID x 28 days   -micro to hold synovial fluid cultures x 14 days   -follow up synovial fluid lyme PCR   -outpatient follow up in ID clinic in 2 weeks   -check CBCd and CMP prior to office visit   -outpatient ID office notified of outpatient needs      2.  Suspected lyme disease. Given patient's history of gardening, Lyme serology obtained and positive. In the setting of #1 consider possibility of late lyme disease. Patient endorses several weeks history of headaches, myalgias, and fatigue.   -doxy as above      3.  Leukocytosis.  No other SIRS criteria to indicate sepsis.  Leukocytosis now resolved.  She remains afebrile hemodynamically stable.  -Monitor daily CBC differential for response to treatment     4.  Inflammatory bowel disease. On upadacitinib daily, currently on hold in the setting of infection.     Antibiotics:  Day 2 doxycycline    I have discussed the above management plan in detail with patient.     Above plan discussed in detail with Dr. Brown who  is in agreement with plan to continue the above treatment..     ID consult service will continue to follow.    HISTORY OF PRESENT ILLNESS:  Reason for Consult: fever, concern for septic arthritis     HPI: Jessica Larson is a 59 y.o. year old female with ulcerative colitis on upadacitinib, hypothyroidism who presented with right knee swelling and pain for 3 to 4 days.  Reports difficulty ambulating, fevers, chills and dry heaves.  States pain for started and then she noticed the swelling and went to the urgent care first on 8/15.  Initial x-ray without abnormality.  She was started on empiric course of Bactrim.  Did not take the antibiotic and instead went to the ER later that day after Ortho called her and told her to go to the ER.  In the ER patient was found to have elevated inflammatory markers with a large knee effusion.   Right knee was aspirated in the emergency department with note of purulent synovial fluid and Ortho was consulted.  She was subsequently started on ceftriaxone and vancomycin.  She continued to have inability to bear weight and she was taken to the OR for arthroscopic incision and drainage on 8/16.  While in the OR she was found to have cloudy, turbid synovial fluid that was expressed from the joint and sent for culture, meniscal fraying but no obvious tears, and inflamed synovium, particularly in the suprapatellar notch.  Multiple samples of the synovium were collected and sent for culture and pathology.  The synovium was subsequently debrided and irrigated.  Patient states she works in her garden a lot and is often on her knees. Admits to subjective fevers and chills but reports difficult to distinguish from hot flashes with menopause. Reports several weeks hx, at least 2 weeks of myalgias, fatigue, and headaches. No known typical exposure or insect bites but is outside on her property gardening daily and often forgets bug spray.     REVIEW OF SYSTEMS:  A complete review of systems is  negative other than that noted in the HPI.    PAST MEDICAL HISTORY:  Past Medical History:   Diagnosis Date    Cancer (HCC)     thyroid,vulvar    Chronic pain disorder     Crohn's disease (HCC)     Disease of thyroid gland     GERD (gastroesophageal reflux disease)     Hiatal hernia     Hyperlipidemia     PONV (postoperative nausea and vomiting)      Past Surgical History:   Procedure Laterality Date    CERVICAL FUSION      C 6-7    COLONOSCOPY  2004    ,,,,    EGD      EGD      HYSTERECTOMY      INCISION AND DRAINAGE OF WOUND Right 2024    Procedure: Right knee arthroscopic I&D;  Surgeon: Boogie Christianson MD;  Location: [facility];  Service: Orthopedics    NECK SURGERY       plates and screws in neck bewtween 6-7    LA INSJ TUNNELED CTR VAD W/SUBQ PORT AGE 5 YR/> Left 10/10/2019    Procedure: INSERTION VENOUS PORT (PORT-A-CATH);  Surgeon: Juan Johns MD;  Location:  MAIN OR;  Service: General    LA LAPS SURG CHOLECYSTECTOMY W/CHOLANGIOGRAPHY N/A 2018    Procedure: LAPAROSCOPIC CHOLECYSTECTOMY WITH CHOLANGIOGRAM;  Surgeon: Juan Johns MD;  Location:  MAIN OR;  Service: General    SIMPLE VULVECTOMY  2014    THYROIDECTOMY         FAMILY HISTORY:  Non-contributory    SOCIAL HISTORY:  Social History   Social History     Substance and Sexual Activity   Alcohol Use Yes    Comment: occ wine     Social History     Substance and Sexual Activity   Drug Use Yes    Types: Marijuana    Comment: Medical marijuana,crohns     Social History     Tobacco Use   Smoking Status Former    Current packs/day: 0.00    Average packs/day: 0.5 packs/day for 15.0 years (7.5 ttl pk-yrs)    Types: Cigarettes    Start date: 1992    Quit date: 2007    Years since quittin.6   Smokeless Tobacco Never       ALLERGIES:  Allergies   Allergen Reactions    Humira [Adalimumab] Rash    Mercaptopurine Rash     Cancer occurrence--this medication is for Chrohn's disease     Penicillins Lightheadedness    Remicade [Infliximab] Rash    Skyrizi [Risankizumab] Dizziness and Lightheadedness       MEDICATIONS:  All current active medications have been reviewed.    PHYSICAL EXAM:  Temp:  [98.1 °F (36.7 °C)-98.5 °F (36.9 °C)] 98.1 °F (36.7 °C)  HR:  [57-74] 57  Resp:  [14-19] 14  BP: (130-168)/(76-83) 132/79  SpO2:  [97 %-98 %] 97 %  Temp (24hrs), Av.3 °F (36.8 °C), Min:98.1 °F (36.7 °C), Max:98.5 °F (36.9 °C)  Current: Temperature: 98.1 °F (36.7 °C)    Intake/Output Summary (Last 24 hours) at 2024 1143  Last data filed at 2024 0800  Gross per 24 hour   Intake 360 ml   Output --   Net 360 ml       General Appearance:  Appearing well, nontoxic, and in no distress   Head:  Normocephalic, without obvious abnormality, atraumatic. Ambulating halls with walker.    Eyes:  Conjunctiva pink and sclera anicteric, both eyes   Nose: Nares normal, mucosa normal, no drainage   Throat: Oropharynx moist without lesions   Neck: Supple, symmetrical, no adenopathy, no tenderness/mass/nodules   Back:   Symmetric, no curvature, ROM normal.   Lungs:   Clear to auscultation bilaterally, respirations unlabored   Chest Wall:  No tenderness or deformity. Left chest wall SC port. Not accessed.    Heart:  RRR; no murmur, rub or gallop   Abdomen:   Soft, non-tender, non-distended, positive bowel sounds    Extremities: No cyanosis, clubbing or edema   Skin: No rashes or lesions. No draining wounds noted.right knee swelling compared to left, incisions with sutures intact. No warmth or erythema. Nontender.    Neurologic: Alert and oriented times 3, extremity strength 5/5 and symmetric       LABS, IMAGING, & OTHER STUDIES:  Extensive review of the medical records in epic including review of the notes, radiographs, and laboratory results were reviewed personally as below.     Lab Results:  I have personally reviewed pertinent labs.  Results from last 7 days   Lab Units 24  0440 24  0422 24  0440    WBC Thousand/uL 9.09 9.82 10.76*   HEMOGLOBIN g/dL 10.3* 9.8* 11.0*   PLATELETS Thousands/uL 296 261 254     Results from last 7 days   Lab Units 08/18/24  0440 08/17/24  0422 08/16/24  0440 08/15/24  1409   SODIUM mmol/L 137 137 136 132*   POTASSIUM mmol/L 3.8 3.9 2.9* 3.5   CHLORIDE mmol/L 102 99 95* 91*   CO2 mmol/L 28 28 32 31   BUN mg/dL 8 8 6 9   CREATININE mg/dL 0.52* 0.47* 0.49* 0.63   EGFR ml/min/1.73sq m 104 108 106 98   CALCIUM mg/dL 8.1* 8.1* 8.7 9.6   AST U/L  --   --  24 33   ALT U/L  --   --  25 31   ALK PHOS U/L  --   --  64 75     Results from last 7 days   Lab Units 08/16/24  1722 08/16/24  1710 08/15/24  1625   GRAM STAIN RESULT  No Polys or Bacteria seen 1+ Polys  No bacteria seen 4+ Polys  1+ Mononuclear Cells  No bacteria seen   BODY FLUID CULTURE, STERILE   --   --  No growth         Results from last 7 days   Lab Units 08/15/24  1409   CRP mg/L 269.8*               Lab interpretation/comments: Mild leukocytosis resolved    Culture data: OR cultures 8/16 negative, bedside arthrocentesis fluid cultures 8/15 negative    Imaging Studies:   Imaging interpretation/comments: CT lower extremity with large joint effusion

## 2024-08-19 NOTE — OCCUPATIONAL THERAPY NOTE
Occupational Therapy Evaluation     Patient Name: Jessica Larson  Today's Date: 8/19/2024  Problem List  Active Problems:    Crohn's disease (HCC)    Gastroesophageal reflux disease without esophagitis    Postoperative hypothyroidism    Primary hypertension    S/P cervical spinal fusion    PONV (postoperative nausea and vomiting)    Past Medical History  Past Medical History:   Diagnosis Date    Cancer (HCC)     thyroid,vulvar    Chronic pain disorder     Crohn's disease (HCC)     Disease of thyroid gland     GERD (gastroesophageal reflux disease)     Hiatal hernia     Hyperlipidemia     PONV (postoperative nausea and vomiting)      Past Surgical History  Past Surgical History:   Procedure Laterality Date    CERVICAL FUSION      C 6-7    COLONOSCOPY  2004    2006,2012,2014,2016,2018    EGD  2012    EGD  2016    HYSTERECTOMY  2013    INCISION AND DRAINAGE OF WOUND Right 8/16/2024    Procedure: Right knee arthroscopic I&D;  Surgeon: Boogie Christianson MD;  Location: [facility];  Service: Orthopedics    NECK SURGERY  2011     plates and screws in neck bewtween 6-7    LA INSJ TUNNELED CTR VAD W/SUBQ PORT AGE 5 YR/> Left 10/10/2019    Procedure: INSERTION VENOUS PORT (PORT-A-CATH);  Surgeon: Juan Johns MD;  Location:  MAIN OR;  Service: General    LA LAPS SURG CHOLECYSTECTOMY W/CHOLANGIOGRAPHY N/A 9/27/2018    Procedure: LAPAROSCOPIC CHOLECYSTECTOMY WITH CHOLANGIOGRAM;  Surgeon: Juan Johns MD;  Location:  MAIN OR;  Service: General    SIMPLE VULVECTOMY  12/24/2014    THYROIDECTOMY           08/19/24 1313   OT Last Visit   OT Visit Date 08/19/24   Note Type   Note type Evaluation   Additional Comments Nsg staff verbally cleared pt for OT evaluation.  Pt received  supine in bed c HOB semi-elevated on this date + is agreeable to OT session @ this time. @ exit, pt remains in  supine in bed c HOB semi-elevated c all lines intact, all needs met, call bell in hand + nsg aware of location/disposition.   Pain  "Assessment   Pain Assessment Tool 0-10   Pain Score 6   Pain Location/Orientation Orientation: Right;Location: Knee   Pain Onset/Description Onset: Ongoing  (c WB)   Patient's Stated Pain Goal No pain   Hospital Pain Intervention(s) Medication (See MAR)   Restrictions/Precautions   Weight Bearing Precautions Per Order Yes   RLE Weight Bearing Per Order WBAT   Home Living   Type of Home House   Home Layout One level;Stairs to enter with rails;Performs ADLs on one level;Able to live on main level with bedroom/bathroom  (2 MARCO)   Bathroom Shower/Tub Walk-in shower   Bathroom Toilet Raised   Bathroom Equipment Shower chair;Grab bars in shower   Bathroom Accessibility Accessible   Home Equipment Cane  (SPC used since increased knee pain)   Prior Function   Level of [place] Independent with ADLs;Independent with functional mobility;Independent with IADLS   Lives With Spouse   Receives Help From Family   IADLs Independent with meal prep;Independent with driving;Independent with medication management   Falls in the last 6 months 0   Vocational Retired   Lifestyle   Autonomy Pt lives in  1 story home c spouse + @ baseline, performs ADLs  I'ly, IADLs I'ly + fxl mobility I'ly without AD. (+) . Pt is retired.   Reciprocal Relationships    Service to Others Retired   Intrinsic Gratification Gardening   Subjective   Subjective \"Oh I'm going home\"   ADL   Eating Assistance 7  Independent   Grooming Assistance 7  Independent   UB Bathing Assistance 7  Independent   LB Bathing Assistance 4  Minimal Assistance   UB Dressing Assistance 7  Independent   LB Dressing Assistance 4  Minimal Assistance   Toileting Assistance  5  Supervision/Setup   Bed Mobility   Supine to Sit 6  Modified independent   Additional items HOB elevated;Verbal cues   Sit to Supine 6  Modified independent   Additional items HOB elevated;Verbal cues   Transfers   Sit to Stand 5  Supervision   Additional items Verbal cues   Stand to Sit 6  " Modified independent   Functional Mobility   Additional Comments Pt performed short distance ADL related fxl mobility in room c SUP, with RW simulating toileting distances.   No overt LOB demonstrated + pt c/o pain /  denies SOB/ denies dizziness during transitional movements. Reports feeling minimally below baseline c abilities related to ADL-focused fxl mobility. G safety awareness noted while navigating t/o room. Safely returns to bed for remainder of session.   Balance   Static Sitting Normal   Dynamic Sitting Good   Static Standing Fair   Dynamic Standing Fair -   Ambulatory Fair -   Activity Tolerance   Activity Tolerance Patient limited by pain   Nurse Made Aware Medical staff made aware of current fxn, recommendations for d/c planning, fall risk + pt's location upon exit. (Rafaela)   RUE Assessment   RUE Assessment WFL   LUE Assessment   LUE Assessment WFL   Hand Function   Gross Motor Coordination Functional   Fine Motor Coordination Functional   Sensation   Additional Comments None   Vision-Basic Assessment   Current Vision Wears glasses all the time   Psychosocial   Psychosocial (WDL) WDL   Cognition   Overall Cognitive Status WFL   Arousal/Participation Alert;Responsive;Cooperative   Attention Within functional limits   Orientation Level Oriented X4   Memory Within functional limits   Following Commands Follows all commands and directions without difficulty   Assessment   Limitation Decreased ADL status;Decreased endurance;Decreased self-care trans;Decreased high-level ADLs   Prognosis Good   Assessment Patient is a 59 y.o. female seen for OT evaluation s/p admit to  [facility]24 w/Acute pain of right knee + commorbidities/PMHx (as listed in medical record) affecting patient's functional performance c ADL tasks at time of assessment. OT orders placed for evaluation and treatment to assess pt's ADLs, cognitive status + performance during functional tasks in order to formulate appropriate  d/c recommendations.     Therapist performed at least two patient identifiers during session including name and wristband. Personal factors affecting patient at time of initial evaluation include: step(s) to enter environment, inability to perform IADLs, inability to perform ADLs, new need for AD, inability to ambulate household distances, and inability to navigate community distances.   Pt's clinical presentation is currently evolving given new onset deficits that effect pt's occupational performance and ability to safely return to OF including decrease activity tolerance, decrease standing balance, decrease performance during ADL tasks, decrease activity engagement, and decrease performance during functional transfers combined with medical complications of edema/swelling, decreased skin integrity, and need for input for mobility technique/safety. This evaluation required an extensive review of medical and/or therapy records and additional review of physical, cognitive and psychosocial history related to functional performance. Based upon functional performance deficits and assessments, this evaluation has been identified as a  moderate complexity evaluation.      Patient to benefit from continued Occupational Therapy treatment while in the hospital to address aforementioned deficits and maximize level of functional independence with ADLs and functional mobility.   Goals   Patient Goals To go home   Plan   Treatment Interventions ADL retraining;Functional transfer training;Endurance training;Patient/family training;Equipment evaluation/education;Compensatory technique education;Energy conservation;Activityengagement   Goal Expiration Date 08/29/24   OT Treatment Day 0   OT Frequency 1-2x/wk   Discharge Recommendation   Rehab Resource Intensity Level, OT III (Minimum Resource Intensity)   AM-PAC Daily Activity Inpatient   Lower Body Dressing 3   Bathing 3   Toileting 3   Upper Body Dressing 4   Grooming 4   Eating  4   Daily Activity Raw Score 21   Daily Activity Standardized Score (Calc for Raw Score >=11) 44.27   AM-PAC Applied Cognition Inpatient   Following a Speech/Presentation 4   Understanding Ordinary Conversation 4   Taking Medications 4   Remembering Where Things Are Placed or Put Away 4   Remembering List of 4-5 Errands 4   Taking Care of Complicated Tasks 4   Applied Cognition Raw Score 24   Applied Cognition Standardized Score 62.21   Barthel Index   Feeding 10   Bathing 0   Grooming Score 5   Dressing Score 5   Bladder Score 10   Bowels Score 10   Toilet Use Score 5   Transfers (Bed/Chair) Score 10   Mobility (Level Surface) Score 0   Stairs Score 5   Barthel Index Score 60   End of Consult   Patient Position at End of Consult Supine;All needs within reach   Nurse Communication Nurse aware of consult     The patient's raw score on the AM-PAC Daily Activity inpatient short form is 21, standardized score is 44.27, greater than 39.4. Please refer to the recommendation of the Occupational Therapist for safe DC planning.    Resource Intensity Recommendation: Level III (Minimum Resource Intensity)        GOALS:    *ADL transfers with (I) for inc'd independence with ADLs/purposeful tasks    *UB ADL with (I) for inc'd independence with self cares    *LB ADL with (I) using AE prn for inc'd independence with self cares    *Toileting with (I) for clothing management and hygiene for return to PLOF with personal care    *Increase stand tolerance x 5  m for inc'd tolerance with standing purposeful tasks    *Participate in 10m UE therex to increase overall stamina/activity tolerance for purposeful tasks    *Bed mobility- (I) for inc'd independence to manage own comfort and initiate EOB & OOB purposeful tasks    *Patient will verbalize 3 safety awareness/ principles to prevent falls in the home setting.     *Patient will verbalize and demonstrate use of energy conservation/deep breathing techniques and work  simplification skills during functional activities with no verbal cues.     *Patient will increase OOB/sitting tolerance to 2-4 hours per day to increase participation in self-care and leisure tasks with no s/s of exertion.     *Patient will engage in ongoing cognitive assessment to assist with safe discharge planning/recommendations.     *Pt will verbalize and demonstrate understanding of post-op movement precautions 100% (if applicable) in tx sessions for increased safety and functional mobility.      *Pt will demonstrate use of long handled AE (if appropriate) during 100% of tx sessions for increased ADL safety and independence following D/C     Mary Ann Causey OTR/L

## 2024-08-19 NOTE — PLAN OF CARE
Problem: Potential for Falls  Goal: Patient will remain free of falls  Description: INTERVENTIONS:  - Educate patient/family on patient safety including physical limitations  - Instruct patient to call for assistance with activity   - Consult OT/PT to assist with strengthening/mobility   - Keep Call bell within reach  - Keep bed low and locked with side rails adjusted as appropriate  - Keep care items and personal belongings within reach  - Initiate and maintain comfort rounds  - Make Fall Risk Sign visible to staff  - Offer Toileting every 2 Hours, in advance of need  - Initiate/Maintain bed alarm  - Obtain necessary fall risk management equipment: walker  - Apply yellow socks and bracelet for high fall risk patients  - Consider moving patient to room near nurses station  8/19/2024 1508 by Rafaela Martinez RN  Outcome: Adequate for Discharge  8/19/2024 1040 by Rafaela Martinez RN  Outcome: Progressing     Problem: PAIN - ADULT  Goal: Verbalizes/displays adequate comfort level or baseline comfort level  Description: Interventions:  - Encourage patient to monitor pain and request assistance  - Assess pain using appropriate pain scale  - Administer analgesics based on type and severity of pain and evaluate response  - Implement non-pharmacological measures as appropriate and evaluate response  - Consider cultural and social influences on pain and pain management  - Notify physician/advanced practitioner if interventions unsuccessful or patient reports new pain  8/19/2024 1508 by Rafaela Martinez RN  Outcome: Adequate for Discharge  8/19/2024 1040 by Rafaela Martinez RN  Outcome: Progressing     Problem: INFECTION - ADULT  Goal: Absence or prevention of progression during hospitalization  Description: INTERVENTIONS:  - Assess and monitor for signs and symptoms of infection  - Monitor lab/diagnostic results  - Monitor all insertion sites, i.e. indwelling lines, tubes, and drains  - Monitor endotracheal if appropriate and nasal secretions  for changes in amount and color  - Buda appropriate cooling/warming therapies per order  - Administer medications as ordered  - Instruct and encourage patient and family to use good hand hygiene technique  - Identify and instruct in appropriate isolation precautions for identified infection/condition  8/19/2024 1508 by Rafaela Martinez RN  Outcome: Adequate for Discharge  8/19/2024 1040 by Rafaela Martinez RN  Outcome: Progressing  Goal: Absence of fever/infection during neutropenic period  Description: INTERVENTIONS:  - Monitor WBC    8/19/2024 1508 by Rafaela Martinez RN  Outcome: Adequate for Discharge  8/19/2024 1040 by Rafaela Martinez RN  Outcome: Progressing     Problem: SAFETY ADULT  Goal: Patient will remain free of falls  Description: INTERVENTIONS:  - Educate patient/family on patient safety including physical limitations  - Instruct patient to call for assistance with activity   - Consult OT/PT to assist with strengthening/mobility   - Keep Call bell within reach  - Keep bed low and locked with side rails adjusted as appropriate  - Keep care items and personal belongings within reach  - Initiate and maintain comfort rounds  - Make Fall Risk Sign visible to staff  - Offer Toileting every 2 Hours, in advance of need  - Initiate/Maintain bed alarm  - Obtain necessary fall risk management equipment: walker  - Apply yellow socks and bracelet for high fall risk patients  - Consider moving patient to room near nurses station  8/19/2024 1508 by Rafaela Martinez RN  Outcome: Adequate for Discharge  8/19/2024 1040 by Rafaela Martinez RN  Outcome: Progressing  Goal: Maintain or return to baseline ADL function  Description: INTERVENTIONS:  -  Assess patient's ability to carry out ADLs; assess patient's baseline for ADL function and identify physical deficits which impact ability to perform ADLs (bathing, care of mouth/teeth, toileting, grooming, dressing, etc.)  - Assess/evaluate cause of self-care deficits   - Assess range of motion  -  Assess patient's mobility; develop plan if impaired  - Assess patient's need for assistive devices and provide as appropriate  - Encourage maximum independence but intervene and supervise when necessary  - Involve family in performance of ADLs  - Assess for home care needs following discharge   - Consider OT consult to assist with ADL evaluation and planning for discharge  - Provide patient education as appropriate  8/19/2024 1508 by Rafaela Martinez RN  Outcome: Adequate for Discharge  8/19/2024 1040 by Rafaela Martinez RN  Outcome: Progressing  Goal: Maintains/Returns to pre admission functional level  Description: INTERVENTIONS:  - Perform AM-PAC 6 Click Basic Mobility/ Daily Activity assessment daily.  - Set and communicate daily mobility goal to care team and patient/family/caregiver.   - Collaborate with rehabilitation services on mobility goals if consulted  - Perform Range of Motion 3 times a day.  - Reposition patient every 2 hours.  - Dangle patient 3 times a day  - Stand patient 3 times a day  - Ambulate patient 3 times a day  - Out of bed to chair 3 times a day   - Out of bed for meals 3 times a day  - Out of bed for toileting  - Record patient progress and toleration of activity level   8/19/2024 1508 by Rafaela Martinez RN  Outcome: Adequate for Discharge  8/19/2024 1040 by Rafaela Martinez RN  Outcome: Progressing     Problem: DISCHARGE PLANNING  Goal: Discharge to home or other facility with appropriate resources  Description: INTERVENTIONS:  - Identify barriers to discharge w/patient and caregiver  - Arrange for needed discharge resources and transportation as appropriate  - Identify discharge learning needs (meds, wound care, etc.)  - Arrange for interpretive services to assist at discharge as needed  - Refer to Case Management Department for coordinating discharge planning if the patient needs post-hospital services based on physician/advanced practitioner order or complex needs related to functional status,  cognitive ability, or social support system  8/19/2024 1508 by Rafaela Martinez RN  Outcome: Adequate for Discharge  8/19/2024 1040 by Rafaela Martinez RN  Outcome: Progressing     Problem: Knowledge Deficit  Goal: Patient/family/caregiver demonstrates understanding of disease process, treatment plan, medications, and discharge instructions  Description: Complete learning assessment and assess knowledge base.  Interventions:  - Provide teaching at level of understanding  - Provide teaching via preferred learning methods  8/19/2024 1508 by Rafaela Martinez RN  Outcome: Adequate for Discharge  8/19/2024 1040 by Rafaela Martinez RN  Outcome: Progressing

## 2024-08-19 NOTE — PLAN OF CARE
Problem: OCCUPATIONAL THERAPY ADULT  Goal: Performs self-care activities at highest level of function for planned discharge setting.  See evaluation for individualized goals.  Description: Treatment Interventions: ADL retraining, Functional transfer training, Endurance training, Patient/family training, Equipment evaluation/education, Compensatory technique education, Energy conservation, Activityengagement          See flowsheet documentation for full assessment, interventions and recommendations.   Note: Limitation: Decreased ADL status, Decreased endurance, Decreased self-care trans, Decreased high-level ADLs  Prognosis: Good  Assessment: Patient is a 59 y.o. female seen for OT evaluation s/p admit to  [facility] on 8/15/2024 w/Acute pain of right knee + commorbidities/PMHx (as listed in medical record) affecting patient's functional performance c ADL tasks at time of assessment. OT orders placed for evaluation and treatment to assess pt's ADLs, cognitive status + performance during functional tasks in order to formulate appropriate d/c recommendations.     Therapist performed at least two patient identifiers during session including name and wristband. Personal factors affecting patient at time of initial evaluation include: step(s) to enter environment, inability to perform IADLs, inability to perform ADLs, new need for AD, inability to ambulate household distances, and inability to navigate community distances.   Pt's clinical presentation is currently evolving given new onset deficits that effect pt's occupational performance and ability to safely return to OF including decrease activity tolerance, decrease standing balance, decrease performance during ADL tasks, decrease activity engagement, and decrease performance during functional transfers combined with medical complications of edema/swelling, decreased skin integrity, and need for input for mobility technique/safety. This evaluation required  an extensive review of medical and/or therapy records and additional review of physical, cognitive and psychosocial history related to functional performance. Based upon functional performance deficits and assessments, this evaluation has been identified as a  moderate complexity evaluation.      Patient to benefit from continued Occupational Therapy treatment while in the hospital to address aforementioned deficits and maximize level of functional independence with ADLs and functional mobility.     Rehab Resource Intensity Level, OT: III (Minimum Resource Intensity)

## 2024-08-19 NOTE — ASSESSMENT & PLAN NOTE
On upadacitinib 15 mg daily.  Will hold in the setting of acute infection  Will continue to hold-discussed with patient to contact GI team to inquire on setting to restart medication

## 2024-08-20 ENCOUNTER — TELEPHONE (OUTPATIENT)
Age: 59
End: 2024-08-20

## 2024-08-20 DIAGNOSIS — A69.23 LYME ARTHRITIS (HCC): Primary | ICD-10-CM

## 2024-08-20 LAB — RHODAMINE-AURAMINE STN SPEC: NORMAL

## 2024-08-20 NOTE — PROGRESS NOTES
OPAT NOTE    Supervising/Discharge physician: Isaias    Diagnosis: Lyme Arthritis    Drug: Doxycycline    Dose/Route/Frequency: 100mg PO BID    Labs/Frequency: CBCD CMP prior to appt    End Date: 9/15      Next appointment: 9/5

## 2024-08-20 NOTE — TELEPHONE ENCOUNTER
Hello,    Please advise if a forced appointment can be accommodated for the patient:    Call back #: [phone]    Insurance: Medicare/[employer]    Reason for appointment: po right knee I&D 8/16/24    Requested doctor and/or location: Sammy/[facility]      Thank you.

## 2024-08-20 NOTE — TELEPHONE ENCOUNTER
Caller: patient    Doctor: Sammy    Reason for call: pt had sx 8/16/24 right knee I&D, she called stating her AVS has listed that she is to take Meloxicam and Zofran. She stated that those 2 prescriptions were not sent to the pharmacy, she asked if she is to take them    Call back#: [phone]

## 2024-08-20 NOTE — TELEPHONE ENCOUNTER
is not in the office till next week but if you want me to put them on I will ask him when he wants to see her.

## 2024-08-21 NOTE — TELEPHONE ENCOUNTER
Can some one double book a post op for this PT. Friday the 30th would be the best so she can get her sutures out.

## 2024-08-22 LAB
DME PARACHUTE DELIVERY DATE ACTUAL: NORMAL
DME PARACHUTE DELIVERY DATE REQUESTED: NORMAL
DME PARACHUTE DELIVERY NOTE: NORMAL
DME PARACHUTE ITEM DESCRIPTION: NORMAL
DME PARACHUTE ORDER STATUS: NORMAL
DME PARACHUTE SUPPLIER NAME: NORMAL
DME PARACHUTE SUPPLIER PHONE: NORMAL

## 2024-08-23 PROCEDURE — 88305 TISSUE EXAM BY PATHOLOGIST: CPT | Performed by: SPECIALIST

## 2024-08-23 PROCEDURE — 88312 SPECIAL STAINS GROUP 1: CPT | Performed by: SPECIALIST

## 2024-08-25 LAB
B BURGDOR DNA SPEC QL NAA+PROBE: POSITIVE
BACTERIA SPEC ANAEROBE CULT: NO GROWTH
BACTERIA SPEC ANAEROBE CULT: NO GROWTH
BACTERIA SPEC BFLD CULT: NO GROWTH
BACTERIA TISS AEROBE CULT: NO GROWTH
BACTERIA TISS AEROBE CULT: NO GROWTH
GRAM STN SPEC: NORMAL

## 2024-08-26 ENCOUNTER — APPOINTMENT (OUTPATIENT)
Dept: LAB | Facility: CLINIC | Age: 59
End: 2024-08-26
Payer: MEDICARE

## 2024-08-26 DIAGNOSIS — A69.23 LYME ARTHRITIS (HCC): ICD-10-CM

## 2024-08-26 DIAGNOSIS — E89.0 POSTOPERATIVE HYPOTHYROIDISM: ICD-10-CM

## 2024-08-26 LAB
ALBUMIN SERPL BCG-MCNC: 4.1 G/DL (ref 3.5–5)
ALP SERPL-CCNC: 148 U/L (ref 34–104)
ALT SERPL W P-5'-P-CCNC: 26 U/L (ref 7–52)
ANION GAP SERPL CALCULATED.3IONS-SCNC: 13 MMOL/L (ref 4–13)
AST SERPL W P-5'-P-CCNC: 26 U/L (ref 13–39)
BASOPHILS # BLD AUTO: 0.02 THOUSANDS/ÂΜL (ref 0–0.1)
BASOPHILS NFR BLD AUTO: 0 % (ref 0–1)
BILIRUB SERPL-MCNC: 0.31 MG/DL (ref 0.2–1)
BUN SERPL-MCNC: 11 MG/DL (ref 5–25)
CALCIUM SERPL-MCNC: 9.1 MG/DL (ref 8.4–10.2)
CHLORIDE SERPL-SCNC: 93 MMOL/L (ref 96–108)
CO2 SERPL-SCNC: 28 MMOL/L (ref 21–32)
CREAT SERPL-MCNC: 0.62 MG/DL (ref 0.6–1.3)
EOSINOPHIL # BLD AUTO: 0.11 THOUSAND/ÂΜL (ref 0–0.61)
EOSINOPHIL NFR BLD AUTO: 2 % (ref 0–6)
ERYTHROCYTE [DISTWIDTH] IN BLOOD BY AUTOMATED COUNT: 13 % (ref 11.6–15.1)
FUNGUS SPEC CULT: NORMAL
GFR SERPL CREATININE-BSD FRML MDRD: 99 ML/MIN/1.73SQ M
GLUCOSE SERPL-MCNC: 81 MG/DL (ref 65–140)
HCT VFR BLD AUTO: 40.5 % (ref 34.8–46.1)
HGB BLD-MCNC: 12.9 G/DL (ref 11.5–15.4)
IMM GRANULOCYTES # BLD AUTO: 0.02 THOUSAND/UL (ref 0–0.2)
IMM GRANULOCYTES NFR BLD AUTO: 0 % (ref 0–2)
LYMPHOCYTES # BLD AUTO: 2.09 THOUSANDS/ÂΜL (ref 0.6–4.47)
LYMPHOCYTES NFR BLD AUTO: 38 % (ref 14–44)
MCH RBC QN AUTO: 30.3 PG (ref 26.8–34.3)
MCHC RBC AUTO-ENTMCNC: 31.9 G/DL (ref 31.4–37.4)
MCV RBC AUTO: 95 FL (ref 82–98)
MONOCYTES # BLD AUTO: 0.79 THOUSAND/ÂΜL (ref 0.17–1.22)
MONOCYTES NFR BLD AUTO: 14 % (ref 4–12)
NEUTROPHILS # BLD AUTO: 2.47 THOUSANDS/ÂΜL (ref 1.85–7.62)
NEUTS SEG NFR BLD AUTO: 46 % (ref 43–75)
NRBC BLD AUTO-RTO: 0 /100 WBCS
PLATELET # BLD AUTO: 421 THOUSANDS/UL (ref 149–390)
PMV BLD AUTO: 12.6 FL (ref 8.9–12.7)
POTASSIUM SERPL-SCNC: 3.8 MMOL/L (ref 3.5–5.3)
PROT SERPL-MCNC: 7.7 G/DL (ref 6.4–8.4)
RBC # BLD AUTO: 4.26 MILLION/UL (ref 3.81–5.12)
SODIUM SERPL-SCNC: 134 MMOL/L (ref 135–147)
TSH SERPL DL<=0.05 MIU/L-ACNC: 0.04 UIU/ML (ref 0.45–4.5)
WBC # BLD AUTO: 5.5 THOUSAND/UL (ref 4.31–10.16)

## 2024-08-26 PROCEDURE — 80053 COMPREHEN METABOLIC PANEL: CPT

## 2024-08-26 PROCEDURE — 84439 ASSAY OF FREE THYROXINE: CPT

## 2024-08-26 PROCEDURE — 85025 COMPLETE CBC W/AUTO DIFF WBC: CPT

## 2024-08-26 PROCEDURE — 36415 COLL VENOUS BLD VENIPUNCTURE: CPT

## 2024-08-26 PROCEDURE — 84443 ASSAY THYROID STIM HORMONE: CPT

## 2024-08-27 LAB
MYCOBACTERIUM SPEC CULT: NORMAL
RHODAMINE-AURAMINE STN SPEC: NORMAL
T4 FREE SERPL-MCNC: 1.24 NG/DL (ref 0.61–1.12)

## 2024-08-28 DIAGNOSIS — E89.0 POSTOPERATIVE HYPOTHYROIDISM: Primary | ICD-10-CM

## 2024-08-28 RX ORDER — LEVOTHYROXINE SODIUM 112 MCG
TABLET ORAL
Qty: 66 TABLET | Refills: 1 | Status: SHIPPED | OUTPATIENT
Start: 2024-08-28

## 2024-08-30 ENCOUNTER — OFFICE VISIT (OUTPATIENT)
Dept: OBGYN CLINIC | Facility: CLINIC | Age: 59
End: 2024-08-30

## 2024-08-30 VITALS
WEIGHT: 95 LBS | HEIGHT: 60 IN | BODY MASS INDEX: 18.65 KG/M2 | HEART RATE: 69 BPM | SYSTOLIC BLOOD PRESSURE: 134 MMHG | DIASTOLIC BLOOD PRESSURE: 86 MMHG

## 2024-08-30 DIAGNOSIS — Z98.890 S/P RIGHT KNEE ARTHROSCOPY: Primary | ICD-10-CM

## 2024-08-30 LAB
BACTERIA SPEC ANAEROBE CULT: NO GROWTH
BACTERIA SPEC ANAEROBE CULT: NO GROWTH
BACTERIA SPEC BFLD CULT: NO GROWTH
BACTERIA TISS AEROBE CULT: NO GROWTH
BACTERIA TISS AEROBE CULT: NO GROWTH
GRAM STN SPEC: NORMAL

## 2024-08-30 PROCEDURE — 99024 POSTOP FOLLOW-UP VISIT: CPT | Performed by: STUDENT IN AN ORGANIZED HEALTH CARE EDUCATION/TRAINING PROGRAM

## 2024-09-03 LAB
FUNGUS SPEC CULT: NORMAL
MYCOBACTERIUM SPEC CULT: NORMAL
RHODAMINE-AURAMINE STN SPEC: NORMAL

## 2024-09-04 NOTE — PROGRESS NOTES
"Outpatient Progress Note - Infectious Disease   Jessica Larson 59 y.o. 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  She is status post operative I&D of the right knee.  Cell counts revealed significant inflammation (140,000 WBC) with negative cultures.  She had positive Lyme serologies as well as saw Borrelia PCR positive from the knee fluid.  She is currently undergoing treatment and noticing good improvement so far.  - Complete 28-day doxycycline course through 9/15  - We discussed that fatigue, malaise, achiness can last for months after Lyme disease which does not represent continued infection  - We also discussed that most people respond to the first course of oral treatment.  Given that she has at least 50% improvement thus far, I am hopeful she will continue to improve as she completes her antibiotic treatment.  It may take a few months to notice full resolution.  - There is no \"test of cure\" for Lyme, and the antibodies are expected to remain positive even after treatment  - Counseled patient and her  on measures to avoid tick exposure  - I would like to have her follow-up with us a few weeks post-treatment to make sure she is continuing to progress in the right direction  - Continue follow up with Ortho    Ulcerative colitis on immunosuppressive therapy  Patient takes a Randell inhibitor, which is a risk factor for infection and currently held.  - May resume following completion of doxycycline course    I have spent a total time of 30 minutes in caring for this patient on the day of the visit/encounter including Diagnostic results, Instructions for management, Patient and family education, Importance of tx compliance, Risk factor reductions, Impressions, Counseling / Coordination of care, Documenting in the medical record, " Reviewing / ordering tests, medicine, procedures  , and Obtaining or reviewing history  .     Summary:  Jesisca Larson is a 59 y.o. female with PMH of ulcerative colitis (on upadacitinib) who was recently hospitalized at [facility] with right knee pain and swelling.  She underwent arthrocentesis showing turbid fluid with negative culture but 140,000 WBC with positive Lyme PCR.  Positive Lyme antibodies as well.  She was taken to the OR with orthopedics on 8/16.  I reviewed the op note.  Approximately 30 cc of cloudy fluid was expressed from the joint.  The synovium appeared inflamed, which was debrided down to healthy tissue.  The knee was irrigated with return of clear fluid.  She was started on doxycycline prior to discharge.  She presents today for follow-up.    Antibiotics:  Doxycycline 100 mg BID    Subjective:  Jessica presents today for follow-up with her  Armani.  She reports feeling generally tired and achy especially in the upper body.  She denies any fevers but is feeling some nausea, chills, and hot flashes as she goes through menopause.  She reports the swelling in her knee is come down significantly and her range of motion is improving.  She saw her orthopedic on 8/30 and was noted to be doing well, suture removed.  She reported extreme pain in the knee when she was first admitted to the hospital.  Calling this a 10 out of 10 pain, she is now at a 5 out of 10.  She is able to bear weight and ambulate, using a cane.  Her  adds that she has been doing the stationary bike which appears to be helping significantly with her mobility and range of motion.    She is taking the doxycycline as prescribed, avoiding cations within 4 hours of taking doxycycline.  She is being careful with sun exposure and taking the pills with a full glass of water.    ROS:  A complete review of systems is negative other than that noted above in the subjective    Followup portions patient history reviewed  and updated as:  Allergies, current medications, past medical history, past social history, past surgical history, and the problem list    Objective:  Vitals:  Vitals:    09/05/24 1103   Pulse: 76   Temp: (!) 96 °F (35.6 °C)   SpO2: 98%   Weight: 43.4 kg (95 lb 11.2 oz)   Height: 5' (1.524 m)       Physical Exam:   General Appearance:  Alert, interactive, appearing well, nontoxic, no acute distress.   Throat: Oropharynx moist without lesions.    Lungs:   Even and unlabored respirations   Abdomen:   Nondistended      MSK: Right knee with very minimal effusion, no swelling, full range of motion without pain   Extremities: No clubbing, cyanosis or edema   Skin: No new rashes or lesions.  Well-healed surgical incisions on the right knee       Labs, Imaging, & Other studies:   All pertinent labs and imaging studies were personally reviewed    Labs:  Lab Results   Component Value Date    K 3.8 08/26/2024    CL 93 (L) 08/26/2024    CO2 28 08/26/2024    BUN 11 08/26/2024    CREATININE 0.62 08/26/2024    GLUF 93 05/17/2024    CALCIUM 9.1 08/26/2024    AST 26 08/26/2024    ALT 26 08/26/2024    ALKPHOS 148 (H) 08/26/2024    EGFR 99 08/26/2024     Lab Results   Component Value Date    WBC 5.50 08/26/2024    HGB 12.9 08/26/2024    HCT 40.5 08/26/2024    MCV 95 08/26/2024     (H) 08/26/2024   ESR 8/15 -66  CRP 8/15 - 269    Micro:  08/15 right knee aspiration:  140,000 WBC, 94% PMN  Fungal culture without growth  AFB culture without growth  Routine culture (held for 14 days) - no growth  Borrelia burgdorferi PCR - positive     Imaging:  X-ray right knee 8/15/2024:  I personally reviewed the images and reports in PACS.  There is no obvious bony destruction.    Aaron Starr MD  [facility]

## 2024-09-05 ENCOUNTER — OFFICE VISIT (OUTPATIENT)
Dept: INFECTIOUS DISEASES | Facility: CLINIC | Age: 59
End: 2024-09-05
Payer: MEDICARE

## 2024-09-05 VITALS
HEART RATE: 76 BPM | BODY MASS INDEX: 18.79 KG/M2 | OXYGEN SATURATION: 98 % | WEIGHT: 95.7 LBS | HEIGHT: 60 IN | TEMPERATURE: 96 F

## 2024-09-05 DIAGNOSIS — D84.9 IMMUNOSUPPRESSION (HCC): ICD-10-CM

## 2024-09-05 DIAGNOSIS — A69.23 LYME ARTHRITIS OF KNEE (HCC): Primary | ICD-10-CM

## 2024-09-05 DIAGNOSIS — K51.90 ULCERATIVE COLITIS (HCC): ICD-10-CM

## 2024-09-05 PROCEDURE — 99214 OFFICE O/P EST MOD 30 MIN: CPT | Performed by: INTERNAL MEDICINE

## 2024-09-05 RX ORDER — PANTOPRAZOLE SODIUM 40 MG/1
40 TABLET, DELAYED RELEASE ORAL DAILY
COMMUNITY

## 2024-09-05 RX ORDER — DULOXETIN HYDROCHLORIDE 60 MG/1
1 CAPSULE, DELAYED RELEASE ORAL DAILY
COMMUNITY
Start: 2024-09-04

## 2024-09-05 RX ORDER — ESTRADIOL 0.1 MG/G
CREAM VAGINAL AS NEEDED
COMMUNITY

## 2024-09-05 RX ORDER — VENLAFAXINE HYDROCHLORIDE 150 MG/1
150 CAPSULE, EXTENDED RELEASE ORAL DAILY
COMMUNITY
Start: 2024-07-25

## 2024-09-06 NOTE — PROGRESS NOTES
Knee Post Operative Visit     Assesment:   59 y.o. female s/p right knee arthroscopic I&D, DOS: 8/16/2024    Plan:  Patient is doing well approximately 2 weeks status post the above procedure.  She states she is having minimal pain and is only taking Tylenol as needed.  She has no swelling and good range of motion in the knee.  Her incisions appear to be healing well with no signs of infection.  I did review the arthroscopy pictures with the patient.  I also reviewed the culture results which were negative for infection but were positive for Lyme disease.  She is currently being followed by infectious disease and is being treated with doxycycline.  I did discuss red flag symptoms including increasing pain, swelling, erythema, drainage from the incisions, fevers, chills.  She is almost completed her 2-week course of aspirin for DVT prophylaxis.  She is weightbearing as tolerated with no brace or crutches.  I recommended follow-up in 4 weeks for repeat evaluation. The patient demonstrated understanding of the discussion and was in agreement with the plan.  All of the questions were answered.  Patient can reach out to clinic with any questions or concerns at any time.    Post-Operative treatment:  Ice to knee for 20 minutes at least 1-2 times daily.  OTC NSAIDS prn for pain.  Tylenol prn for pain.  Let pain guide gradual return activities.  Antibiotics per infectious disease recommendations  Culture results were reviewed with the patient today    Imaging:  Arthroscopy pictures were reviewed with the patient in clinic today    Weight bearing:  as tolerated     ROM:  Full    Brace:  No brace needed    DVT Prophylaxis:  Ambulation    Follow up:   4 weeks    Patient was advised that if they have any fevers, chills, chest pain, shortness of breath, redness or drainage from the incision, please let our office know immediately.          Chief Complaint   Patient presents with    Right Knee - Post-op, Pain       History of  Present Illness:    The patient is a 59 y.o. female who is being evaluated post operatively 2 weeks  status post right knee arthroscopic I&D.    Patient states that her symptoms have improved significantly since the surgery.  She states that she is having minimal pain or swelling in the knee.  She states that her pain is 4 out of 10 at worst.  She is taking Tylenol for pain control.  She has not started physical therapy.  She has been taking aspirin for DVT prophylaxis as well as ambulating.  She is not using crutches or brace at this point.  The cultures from her knee were positive for Lyme and she is currently being treated with doxycycline per infectious disease recommendations      The patient denies any fevers, chills, calf pain, chest pain/shortness of breath, redness or drainage from the incision.       I have reviewed the past medical, surgical, social and family history, medications and allergies as documented in the EMR.      Review of systems: ROS is negative other than that noted in the HPI.  Constitutional: Negative for fatigue and fever.        Physical Exam:    Blood pressure 134/86, pulse 69, height 5' (1.524 m), weight 43.1 kg (95 lb).    General/Constitutional: NAD, well developed, well nourished  HENT: Normocephalic, atraumatic  CV: Intact distal pulses, regular rate  Resp: No respiratory distress or labored breathing  Lymphatic: No lymphadenopathy palpated  Neuro: Alert and Oriented x 3, no focal deficits  Psych: Normal mood, normal affect, normal judgement, normal behavior  Skin: Warm, dry, no rashes, no erythema      right Knee Exam (focused):    Incisions healing well with no erythema, no drainage, no dehiscence  Knee ROM: 0-115  Effusion: minimal  Joint line tenderness: Negative medial; Negative lateral    Stable to varus and valgus stress at 0 and 30 degrees of flexion    Lachman: Negative  Anterior drawer: Negative  Pivot shift: deferred  Posterior drawer: Negative       LE NV Exam:   +2  DP/PT pulses   SILT in the SPN, DPN, sural, saphenous, tibial nerve distributions  SILT L2-S1  Motor intact in the SPN, DPN, tibial nerve distributions    No calf tenderness to palpation bilaterally    Scribe Attestation      I,:   am acting as a scribe while in the presence of the attending physician.:       I,:   personally performed the services described in this documentation    as scribed in my presence.:

## 2024-09-09 LAB — FUNGUS SPEC CULT: NORMAL

## 2024-09-10 LAB
MYCOBACTERIUM SPEC CULT: NORMAL
RHODAMINE-AURAMINE STN SPEC: NORMAL

## 2024-09-16 LAB — FUNGUS SPEC CULT: NORMAL

## 2024-09-17 ENCOUNTER — HOSPITAL ENCOUNTER (OUTPATIENT)
Dept: INFUSION CENTER | Facility: HOSPITAL | Age: 59
Discharge: HOME | End: 2024-09-17

## 2024-09-17 LAB
MYCOBACTERIUM SPEC CULT: NORMAL
RHODAMINE-AURAMINE STN SPEC: NORMAL

## 2024-09-17 NOTE — PROGRESS NOTES
Patient arrived for port flush/ maintenance. Pt offered no complaints today.  Port accessed and flushed with brisk blood return noted. Pt declined AVS, and is aware of next appt. 10/29 at 11a

## 2024-09-23 LAB — FUNGUS SPEC CULT: NORMAL

## 2024-09-24 LAB
MYCOBACTERIUM SPEC CULT: NORMAL
RHODAMINE-AURAMINE STN SPEC: NORMAL

## 2024-09-27 ENCOUNTER — OFFICE VISIT (OUTPATIENT)
Dept: OBGYN CLINIC | Facility: CLINIC | Age: 59
End: 2024-09-27

## 2024-09-27 VITALS
SYSTOLIC BLOOD PRESSURE: 128 MMHG | BODY MASS INDEX: 19.04 KG/M2 | HEIGHT: 60 IN | HEART RATE: 67 BPM | WEIGHT: 97 LBS | DIASTOLIC BLOOD PRESSURE: 82 MMHG

## 2024-09-27 DIAGNOSIS — Z98.890 S/P RIGHT KNEE ARTHROSCOPY: Primary | ICD-10-CM

## 2024-09-27 PROCEDURE — 99024 POSTOP FOLLOW-UP VISIT: CPT | Performed by: STUDENT IN AN ORGANIZED HEALTH CARE EDUCATION/TRAINING PROGRAM

## 2024-09-27 NOTE — PROGRESS NOTES
Knee Post Operative Visit     Assesment:   59 y.o. female s/p right knee arthroscopic I&D, DOS: 8/16/2024    Plan:  Patient is doing well approximately 6 weeks status post the above procedure.  She states that her pain symptoms have improved.  She does endorse some intermittent pain.  She is in the anterior medial knee.  She also has some intermittent swelling.  I did discuss with the patient that she does have noted chondral changes on the medial femoral condyle and patella which could be contribute to her symptoms.  We also discussed and was reiterated by infectious disease that she could continue to have some pain and swelling in her knee secondary to the Lyme disease which is being cleared by from the antibiotics.  I did discuss with patient that given that she is having some clicking and anterior knee pain that I would recommend physical therapy versus home exercises working on some quad strength and knee range of motion.  Patient was amenable to this.  Patient is unable to take anti-inflammatories due to her Crohn's disease.  She did recently complete the antibiotics for Lyme.  I did discuss that I recommend follow-up in 6 weeks.  I discussed that we could reevaluate her symptoms at that point.  If she continues to have persistent pain and swelling, we did discuss possibly of a corticosteroid injection.  However, I did discuss that I would prefer to do that further away from the infection to prevent any risk from persistent or further infection. The patient demonstrated understanding of the discussion and was in agreement with the plan.  All of the questions were answered.  Patient can reach out to clinic with any questions or concerns at any time.    Post-Operative treatment:  Ice to knee for 20 minutes at least 1-2 times daily.  Tylenol prn for pain.  Let pain guide gradual return activities.  Provided home exercise program  Treatment of Lyme disease per ID    Imaging:  No images to review    Weight bearing:   as tolerated     ROM:  Full    Brace:  No brace needed    DVT Prophylaxis:  Ambulation    Follow up:   6 weeks    Patient was advised that if they have any fevers, chills, chest pain, shortness of breath, redness or drainage from the incision, please let our office know immediately.          Chief Complaint   Patient presents with    Right Knee - Pain, Post-op       History of Present Illness:    The patient is a 59 y.o. female who is being evaluated post operatively 6 weeks status post right knee arthroscopic I&D.    Patient states that her symptoms have improved since the surgery.  She states that she is having occasional pain and swelling in the knee, at the end of a long day of walking. She locates the pain under the knee cap and along the medial border. She notes that at times it will be a sharp intense pain. She does states that she has some cracking sensation in the knee. She states that her pain is 6 out of 10 at worst.  She is taking Tylenol and medicinal marijuana for pain control.  She is ambulating with a single point cane. She has completed her prescription for doxycycline per infectious disease recommendations.       The patient denies any fevers, chills, calf pain, chest pain/shortness of breath, redness or drainage from the incision.       I have reviewed the past medical, surgical, social and family history, medications and allergies as documented in the EMR.      Review of systems: ROS is negative other than that noted in the HPI.  Constitutional: Negative for fatigue and fever.        Physical Exam:    Blood pressure 128/82, pulse 67, height 5' (1.524 m), weight 44 kg (97 lb).    General/Constitutional: NAD, well developed, well nourished  HENT: Normocephalic, atraumatic  CV: Intact distal pulses, regular rate  Resp: No respiratory distress or labored breathing  Lymphatic: No lymphadenopathy palpated  Neuro: Alert and Oriented x 3, no focal deficits  Psych: Normal mood, normal affect, normal  judgement, normal behavior  Skin: Warm, dry, no rashes, no erythema      right Knee Exam (focused):    Incisions healing well with no erythema, no drainage, no dehiscence  Knee ROM: 0-115  Effusion: minimal  Joint line tenderness: Negative medial; Negative lateral    Stable to varus and valgus stress at 0 and 30 degrees of flexion    Lachman: Negative  Anterior drawer: Negative  Pivot shift: deferred  Posterior drawer: Negative       LE NV Exam:   +2 DP/PT pulses   SILT in the SPN, DPN, sural, saphenous, tibial nerve distributions  SILT L2-S1  Motor intact in the SPN, DPN, tibial nerve distributions    No calf tenderness to palpation bilaterally    Scribe Attestation      I,:  Lenore Camacho am acting as a scribe while in the presence of the attending physician.:       I,:  Boogie Christianson MD personally performed the services described in this documentation    as scribed in my presence.:

## 2024-09-30 DIAGNOSIS — E89.0 POSTOPERATIVE HYPOTHYROIDISM: Primary | ICD-10-CM

## 2024-10-01 LAB
MYCOBACTERIUM SPEC CULT: NORMAL
RHODAMINE-AURAMINE STN SPEC: NORMAL

## 2024-10-04 NOTE — PROGRESS NOTES
Ambulatory Visit  Name: Jessica Larson      : 1965      MRN: [id number]  Encounter Provider: NICOLAS Burt  Encounter Date: 10/9/2024   Encounter department: [facility] [place]    Assessment & Plan  Postoperative hypothyroidism    Orders:    levothyroxine (Synthroid) 75 mcg tablet; Take 1 tablet (75 mcg total) by mouth daily    TSH, 3rd generation; Future    T4, free; Future    Malignant tumor of thyroid gland (HCC)  Thyroglobulin undetectable. Next due in 2025.          Age-related osteoporosis without current pathological fracture  Last infusion received in May 2024. Patient reports that she experienced some jaw pain r/t TMJD approximately 2-3 weeks after infusion. Counseled on the difference between TMJD pain and ONJ. Next infusion due in May 2025. Next DEXA due in 2025.            History of Present Illness     Jessica Larson is a 59 y.o. female with  a history of thyroid CA s/p thyroidectomy, postsurgical hypothyroidism, and osteoporosis presenting to the office today for follow-up.  Past medical history significant for Crohn's disease.    Patient underwent a total thyroidectomy in 2007.  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     For postoperative hypothyroidism, she is taking brand Synthroid 112 mcg Monday-Thursday. 1/2 tablet on Friday. No dose on Saturday and .       TSH from 2024 is overly suppressed.  Adjustments were made to her levothyroxine dose.  Due for repeat labs.        Review of Systems   Constitutional:  Positive for fatigue. Negative for activity change, appetite change and unexpected weight change.   HENT:  Negative for dental problem, sore throat, trouble swallowing and voice change.    Eyes:  Negative for visual disturbance.   Respiratory:  Negative for cough, chest tightness and shortness of breath.    Cardiovascular:  Negative for chest pain,  palpitations and leg swelling.   Gastrointestinal:  Negative for constipation, diarrhea, nausea and vomiting.   Endocrine: Negative for cold intolerance and heat intolerance.   Genitourinary:  Negative for frequency.   Musculoskeletal:  Positive for arthralgias. Negative for back pain, gait problem and myalgias.   Skin:  Negative for wound.   Allergic/Immunologic: Positive for environmental allergies. Negative for food allergies.   Neurological:  Negative for dizziness, weakness, light-headedness, numbness and headaches.   Psychiatric/Behavioral:  Negative for decreased concentration, dysphoric mood and sleep disturbance. The patient is not nervous/anxious.            Objective     /70 (BP Location: Right arm, Patient Position: Sitting, Cuff Size: Standard)   Pulse 73   Temp 99.8 °F (37.7 °C) (Temporal)   Resp 18   Ht 5' (1.524 m) Comment: verbal  Wt 44.1 kg (97 lb 3.2 oz)   SpO2 97%   BMI 18.98 kg/m²     Physical Exam  Vitals reviewed.   Constitutional:       General: She is not in acute distress.     Appearance: She is well-developed. She is not ill-appearing.   HENT:      Head: Normocephalic and atraumatic.   Eyes:      Conjunctiva/sclera: Conjunctivae normal.   Cardiovascular:      Rate and Rhythm: Normal rate and regular rhythm.      Pulses: Normal pulses.   Pulmonary:      Effort: Pulmonary effort is normal. No respiratory distress.   Abdominal:      Palpations: Abdomen is soft.      Tenderness: There is no abdominal tenderness.   Musculoskeletal:         General: No swelling.      Cervical back: Neck supple.      Right lower leg: No edema.      Left lower leg: No edema.   Skin:     General: Skin is warm and dry.      Capillary Refill: Capillary refill takes less than 2 seconds.   Neurological:      Mental Status: She is alert and oriented to person, place, and time.   Psychiatric:         Mood and Affect: Mood normal.         Behavior: Behavior normal.          Self

## 2024-10-07 ENCOUNTER — APPOINTMENT (OUTPATIENT)
Dept: LAB | Facility: CLINIC | Age: 59
End: 2024-10-07
Payer: MEDICARE

## 2024-10-07 LAB
T4 FREE SERPL-MCNC: 0.72 NG/DL (ref 0.61–1.12)
TSH SERPL DL<=0.05 MIU/L-ACNC: 0.49 UIU/ML (ref 0.45–4.5)

## 2024-10-07 PROCEDURE — 84443 ASSAY THYROID STIM HORMONE: CPT | Performed by: NURSE PRACTITIONER

## 2024-10-07 PROCEDURE — 36415 COLL VENOUS BLD VENIPUNCTURE: CPT | Performed by: NURSE PRACTITIONER

## 2024-10-07 PROCEDURE — 84439 ASSAY OF FREE THYROXINE: CPT | Performed by: NURSE PRACTITIONER

## 2024-10-08 LAB
MYCOBACTERIUM SPEC CULT: NORMAL
RHODAMINE-AURAMINE STN SPEC: NORMAL

## 2024-10-09 ENCOUNTER — OFFICE VISIT (OUTPATIENT)
Dept: ENDOCRINOLOGY | Facility: CLINIC | Age: 59
End: 2024-10-09
Payer: MEDICARE

## 2024-10-09 VITALS
HEIGHT: 60 IN | SYSTOLIC BLOOD PRESSURE: 122 MMHG | BODY MASS INDEX: 19.08 KG/M2 | TEMPERATURE: 99.8 F | DIASTOLIC BLOOD PRESSURE: 70 MMHG | WEIGHT: 97.2 LBS | OXYGEN SATURATION: 97 % | RESPIRATION RATE: 18 BRPM | HEART RATE: 73 BPM

## 2024-10-09 DIAGNOSIS — M81.0 AGE-RELATED OSTEOPOROSIS WITHOUT CURRENT PATHOLOGICAL FRACTURE: ICD-10-CM

## 2024-10-09 DIAGNOSIS — E89.0 POSTOPERATIVE HYPOTHYROIDISM: Primary | ICD-10-CM

## 2024-10-09 DIAGNOSIS — C73 MALIGNANT TUMOR OF THYROID GLAND (HCC): ICD-10-CM

## 2024-10-09 PROCEDURE — 99214 OFFICE O/P EST MOD 30 MIN: CPT | Performed by: NURSE PRACTITIONER

## 2024-10-09 RX ORDER — LEVOTHYROXINE SODIUM 75 UG/1
75 TABLET ORAL DAILY
Qty: 90 TABLET | Refills: 1 | Status: SHIPPED | OUTPATIENT
Start: 2024-10-09 | End: 2024-10-15 | Stop reason: SDUPTHER

## 2024-10-09 NOTE — ASSESSMENT & PLAN NOTE
Orders:    levothyroxine (Synthroid) 75 mcg tablet; Take 1 tablet (75 mcg total) by mouth daily    TSH, 3rd generation; Future    T4, free; Future

## 2024-10-09 NOTE — ASSESSMENT & PLAN NOTE
Last infusion received in May 2024. Patient reports that she experienced some jaw pain r/t TMJD approximately 2-3 weeks after infusion. Counseled on the difference between TMJD pain and ONJ. Next infusion due in May 2025. Next DEXA due in July 2025.

## 2024-10-10 ENCOUNTER — OFFICE VISIT (OUTPATIENT)
Age: 59
End: 2024-10-10
Payer: MEDICARE

## 2024-10-10 VITALS
HEIGHT: 60 IN | TEMPERATURE: 98.3 F | OXYGEN SATURATION: 97 % | SYSTOLIC BLOOD PRESSURE: 116 MMHG | DIASTOLIC BLOOD PRESSURE: 68 MMHG | BODY MASS INDEX: 19.08 KG/M2 | HEART RATE: 73 BPM | WEIGHT: 97.2 LBS

## 2024-10-10 DIAGNOSIS — K50.10 CROHN'S DISEASE OF COLON WITHOUT COMPLICATION (HCC): Primary | ICD-10-CM

## 2024-10-10 PROCEDURE — 99214 OFFICE O/P EST MOD 30 MIN: CPT | Performed by: PHYSICIAN ASSISTANT

## 2024-10-10 PROCEDURE — G2211 COMPLEX E/M VISIT ADD ON: HCPCS | Performed by: PHYSICIAN ASSISTANT

## 2024-10-10 NOTE — PROGRESS NOTES
Outpatient Progress Note - [facility] Infectious Disease   Jessica Larson 59 y.o. 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 Patient recently admitted to [facility] with swollen and hot right knee with significant pain and decreased ROM.  She is status post operative I&D of the right knee.  Cell counts revealed significant inflammation (140,000 WBC) with negative cultures.  She had positive Lyme serologies as well as saw Borrelia PCR positive from the knee fluid.  She completed 28 day course of doxy. Knee pain improved and she is following with ortho.   - No additional abx required   - No ID follow up needed, call PRN   - Counseled patient and her  on measures to avoid tick exposure  - Continue follow up with Ortho     Ulcerative colitis on immunosuppressive therapy. Patient takes a Randell inhibitor, which is a risk factor for infection and currently held.     Above assessment and plan discussed in detail with patient and  during examination.     Antibiotics:  None     Subjective:  Patient presents to outpatient ID office for ongoing management of Lyme arthritis. She completed course of doxy. She feels well overall. Not using treadmill but using stationary bike to keep active. Following stretching exercises recommended by ortho. Ambulating with out a cane now. No fevers or chills. States she bought the bug spray we previously discussed but hasn't used it yet. She will keep it with her gardening supplies.     Objective:    Vitals:   Vitals:    10/10/24 1359   BP: 116/68   Pulse: 73   Temp: 98.3 °F (36.8 °C)   SpO2: 97%     Physical Exam:     General Appearance:  Alert, interactive, nontoxic, no acute distress.   HEENT: Oropharynx moist without lesions.    Lungs:   Clear to auscultation bilaterally; no wheezes, rhonchi or rales; respirations unlabored   Heart:  RRR; no murmur   Abdomen:   Soft, non-tender, non-distended, positive bowel sounds.  No palpable organomegaly.   Extremities: No clubbing, cyanosis or edema. Nontender R knee. Mild, subtle left medial knee effusion.    Skin: No new rashes or lesions. IV site nontender. No draining wounds noted.       Labs, Imaging, & Other studies:   All pertinent labs and imaging studies were personally reviewed by me from 1/7.     The following portions of the patient's history were reviewed and updated as appropriate: allergies, current medications, past family history, past medical history, past social history, past surgical history and problem list.

## 2024-10-15 DIAGNOSIS — E89.0 POSTOPERATIVE HYPOTHYROIDISM: ICD-10-CM

## 2024-10-15 RX ORDER — LEVOTHYROXINE SODIUM 75 UG/1
75 TABLET ORAL DAILY
Qty: 90 TABLET | Refills: 1 | Status: SHIPPED | OUTPATIENT
Start: 2024-10-15

## 2024-10-29 ENCOUNTER — HOSPITAL ENCOUNTER (OUTPATIENT)
Dept: INFUSION CENTER | Facility: HOSPITAL | Age: 59
Discharge: HOME | End: 2024-10-29
Payer: MEDICARE

## 2024-10-29 PROCEDURE — 96523 IRRIG DRUG DELIVERY DEVICE: CPT

## 2024-10-29 NOTE — PROGRESS NOTES
Jessica Larson  had port flushed per protocol.     Jessica Larson is aware of future appt on  12-10-24 at 1030      AVS declined

## 2024-11-07 ENCOUNTER — OFFICE VISIT (OUTPATIENT)
Dept: OBGYN CLINIC | Facility: CLINIC | Age: 59
End: 2024-11-07

## 2024-11-07 VITALS — BODY MASS INDEX: 19.04 KG/M2 | HEIGHT: 60 IN | WEIGHT: 97 LBS

## 2024-11-07 DIAGNOSIS — Z98.890 S/P RIGHT KNEE ARTHROSCOPY: Primary | ICD-10-CM

## 2024-11-07 PROCEDURE — 99024 POSTOP FOLLOW-UP VISIT: CPT | Performed by: STUDENT IN AN ORGANIZED HEALTH CARE EDUCATION/TRAINING PROGRAM

## 2024-11-07 NOTE — PROGRESS NOTES
Ortho Sports Medicine Knee New Patient Visit     Assesment:   59 y.o. female 3 months status post right knee arthroscopy and I&D    Plan:  Jessica upon examination is doing well now 3 months status post right knee arthroscopic I&D.  I did review the intraoperative report that does demonstrate grade 2 arthritic changes of the patella, trochlea and medial femoral condyle.  It is likely that her persistent discomfort is associated with the underlying osteoarthritis.  However she is doing much better.  She may continue use of Tylenol and ice for pain management.  I would like her to continue activities of daily living as tolerated.  We may consider injection therapy in the future if her symptoms persist.  Otherwise, she may follow-up on an as-needed basis.      Follow up:  Return if symptoms worsen or fail to improve.        Chief Complaint   Patient presents with    Right Knee - Pain       History of Present Illness:  The patient is a 59 y.o. female seen in clinic for right knee pain now 3 months status post right knee arthroscopic I&D.  She states that she is doing well overall.  However will experience pain anteriorly with deep knee extension.  She states that she is not experiencing any gross instability.  Follow-up evaluations with infectious disease has cleared her and is currently done with antibiotics.  She does feel that she is progressing with overall function and strength of her lower extremity.  Today she denies any distal paresthesias.  She denies any fevers or chills.      Knee Surgical History:  Right knee arthroscopic I&D.  DOS: 8/16/2024    Past Medical, Social and Family History:  Past Medical History:   Diagnosis Date    Cancer (HCC)     thyroid,vulvar    Chronic pain disorder     Crohn's disease (HCC)     Disease of thyroid gland     GERD (gastroesophageal reflux disease)     Hiatal hernia     Hyperlipidemia     PONV (postoperative nausea and vomiting)      Past Surgical History:   Procedure Laterality  Date    CERVICAL FUSION      C 6-7    COLONOSCOPY  2004    2006,2012,2014,2016,2018    EGD  2012    EGD  2016    HYSTERECTOMY  2013    INCISION AND DRAINAGE OF WOUND Right 8/16/2024    Procedure: Right knee arthroscopic I&D;  Surgeon: Boogie Christianson MD;  Location: [facility];  Service: Orthopedics    NECK SURGERY  2011     plates and screws in neck bewtween 6-7    SD INSJ TUNNELED CTR VAD W/SUBQ PORT AGE 5 YR/> Left 10/10/2019    Procedure: INSERTION VENOUS PORT (PORT-A-CATH);  Surgeon: Juan Johns MD;  Location:  MAIN OR;  Service: General    SD LAPS SURG CHOLECYSTECTOMY W/CHOLANGIOGRAPHY N/A 9/27/2018    Procedure: LAPAROSCOPIC CHOLECYSTECTOMY WITH CHOLANGIOGRAM;  Surgeon: Juan Johns MD;  Location:  MAIN OR;  Service: General    SIMPLE VULVECTOMY  12/24/2014    THYROIDECTOMY       Allergies   Allergen Reactions    Humira [Adalimumab] Rash    Mercaptopurine Rash     Cancer occurrence--this medication is for Chrohn's disease    Penicillins Lightheadedness    Remicade [Infliximab] Rash    Skyrizi [Risankizumab] Dizziness and Lightheadedness     Current Outpatient Medications on File Prior to Visit   Medication Sig Dispense Refill    DULoxetine (CYMBALTA) 60 mg delayed release capsule Take 1 capsule by mouth daily      estradiol (ESTRACE) 0.1 mg/g vaginal cream if needed      levothyroxine (Synthroid) 75 mcg tablet Take 1 tablet (75 mcg total) by mouth daily 90 tablet 1    Multiple Vitamin (MULTI VITAMIN DAILY PO) Take by mouth daily      omeprazole (PriLOSEC) 20 mg delayed release capsule 40 mg       pravastatin (PRAVACHOL) 20 mg tablet Take 20 mg by mouth daily      tiZANidine (ZANAFLEX) 4 mg tablet if needed      valsartan-hydrochlorothiazide (DIOVAN-HCT) 80-12.5 MG per tablet       venlafaxine (EFFEXOR-XR) 150 mg 24 hr capsule Take 150 mg by mouth daily      HYDROcodone-acetaminophen (NORCO) 5-325 mg per tablet Take 1 tablet by mouth every 6 (six) hours as needed for pain Max Daily Amount: 4  tablets (Patient not taking: Reported on 10/9/2024) 10 tablet 0     No current facility-administered medications on file prior to visit.     Social History     Socioeconomic History    Marital status: /Civil Union     Spouse name: Not on file    Number of children: Not on file    Years of education: Not on file    Highest education level: Not on file   Occupational History    Not on file   Tobacco Use    Smoking status: Former     Current packs/day: 0.00     Average packs/day: 0.5 packs/day for 15.0 years (7.5 ttl pk-yrs)     Types: Cigarettes     Start date: 1992     Quit date: 2007     Years since quittin.8     Passive exposure: Past    Smokeless tobacco: Never    Tobacco comments:     Medical marijuana   Vaping Use    Vaping status: Never Used   Substance and Sexual Activity    Alcohol use: Yes     Comment: occ wine    Drug use: Yes     Types: Marijuana     Comment: Medical marijuana,crohns    Sexual activity: Not Currently     Partners: Male     Birth control/protection: Other     Comment: Historectomy   Other Topics Concern    Not on file   Social History Narrative    Not on file     Social Determinants of Health     Financial Resource Strain: Not on file   Food Insecurity: No Food Insecurity (2024)    Nursing - Inadequate Food Risk Classification     Worried About Running Out of Food in the Last Year: Never true     Ran Out of Food in the Last Year: Never true     Ran Out of Food in the Last Year: Not on file   Transportation Needs: No Transportation Needs (2024)    PRAPARE - Transportation     Lack of Transportation (Medical): No     Lack of Transportation (Non-Medical): No   Physical Activity: Not on file   Stress: Not on file   Social Connections: Not on file   Intimate Partner Violence: Not on file   Housing Stability: Low Risk  (2024)    Housing Stability Vital Sign     Unable to Pay for Housing in the Last Year: No     Number of Times Moved in the Last Year: 0      Homeless in the Last Year: No         I have reviewed the past medical, surgical, social and family history, medications and allergies as documented in the EMR.    Review of systems: ROS is negative other than that noted in the HPI.  Constitutional: Negative for fatigue and fever.   HENT: Negative for sore throat.    Respiratory: Negative for shortness of breath.    Cardiovascular: Negative for chest pain.   Gastrointestinal: Negative for abdominal pain.   Endocrine: Negative for cold intolerance and heat intolerance.   Genitourinary: Negative for flank pain.   Musculoskeletal: Negative for back pain.   Skin: Negative for rash.   Allergic/Immunologic: Negative for immunocompromised state.   Neurological: Negative for dizziness.   Psychiatric/Behavioral: Negative for agitation.      Physical Exam:    Height 5' (1.524 m), weight 44 kg (97 lb).    General/Constitutional: NAD, well developed, well nourished  HENT: Normocephalic, atraumatic  CV: Intact distal pulses, regular rate  Resp: No respiratory distress or labored breathing  Neuro: Alert and Oriented x 3  Psych: Normal mood, normal affect, normal judgement, normal behavior  Skin: Warm, dry, no rashes, no erythema      Focused right knee exam:  Ambulates with a normal gait.    Skin is intact. No evidence of ecchymosis, erythema, gross deformity or previous surgical incisions. negative effusion.     Palpation of the knee demonstrates no tenderness over the medial patellar facet, lateral patellar facet, tibial tubercle or patellar tendon.  There is no tenderness over the pes anserine bursa.  There is no tenderness over Gerdy's tubercle. There is no tenderness in the popliteal fossa.  There is no tenderness over the medial or lateral epicondyle.  There is no tenderness with palpation over the posterior calf without palpable cords.    Range of motion testing demonstrates that the patient is able to achieve 0 degrees of extension and 120 degrees of flexion. There is no  pain with hyperflexion or hyperextension.  There is negative patellar crepitus. The patient is able to do a straight leg raise.      Strength testing demonstrates 5/5 strength with hip flexion, 5/5 knee extension, 5/5 knee flexion, and 5/5 dorsiflexion and plantarflexion.    Provocation testing demonstrates  Mensicus- negative medial joint line tenderness, negative lateral joint line tenderness, negative Jim's, negative flexion compression.  Collateral ligaments- negative varus laxity at full extension and in 30° of knee flexion, negative valgus laxity at full extension and in 30° of knee flexion.  Cruciate ligament- 1A Lachman examination, negative pivot shift test, 1A anterior drawer, 1A posterior drawer, negative posterior sag.  Patella- negative apprehension with lateral patellar translation (able to sublux 1/4 quadrant with firm endpoint), negative apprehension with medial patellar translation (able to sublux 1/4 quadrant with firm endpoint), negative J-sign, negative patellar grind test, negative tight lateral patellar tilt.    Range of motion testing of the hip and ankle are within normal limits.    No calf tenderness to palpation bilaterally    LE NV Exam: +2 DP/PT pulses bilaterally  Sensation intact to light touch L2-S1 bilaterally, SPN/DPN/TA motor intact         Knee Imaging    No new imaging reviewed today      Scribe Attestation      I,:  Chapito Kirkpatrick am acting as a scribe while in the presence of the attending physician.:       I,:  Boogie Christianson MD personally performed the services described in this documentation    as scribed in my presence.:

## 2024-11-19 ENCOUNTER — APPOINTMENT (OUTPATIENT)
Dept: LAB | Facility: CLINIC | Age: 59
End: 2024-11-19
Payer: MEDICARE

## 2024-11-19 DIAGNOSIS — E03.9 HYPOTHYROIDISM, UNSPECIFIED TYPE: ICD-10-CM

## 2024-11-19 DIAGNOSIS — I10 HYPERTENSION, UNSPECIFIED TYPE: ICD-10-CM

## 2024-11-19 DIAGNOSIS — E89.0 POSTOPERATIVE HYPOTHYROIDISM: ICD-10-CM

## 2024-11-19 DIAGNOSIS — A69.20 LYME DISEASE: ICD-10-CM

## 2024-11-19 DIAGNOSIS — E78.5 HYPERLIPIDEMIA, UNSPECIFIED HYPERLIPIDEMIA TYPE: ICD-10-CM

## 2024-11-19 DIAGNOSIS — Z79.899 LONG TERM USE OF DRUG: ICD-10-CM

## 2024-11-19 LAB
ALBUMIN SERPL BCG-MCNC: 4.5 G/DL (ref 3.5–5)
ALP SERPL-CCNC: 69 U/L (ref 34–104)
ALT SERPL W P-5'-P-CCNC: 14 U/L (ref 7–52)
ANION GAP SERPL CALCULATED.3IONS-SCNC: 6 MMOL/L (ref 4–13)
AST SERPL W P-5'-P-CCNC: 20 U/L (ref 13–39)
BILIRUB SERPL-MCNC: 0.41 MG/DL (ref 0.2–1)
BUN SERPL-MCNC: 13 MG/DL (ref 5–25)
CALCIUM SERPL-MCNC: 9.2 MG/DL (ref 8.4–10.2)
CHLORIDE SERPL-SCNC: 99 MMOL/L (ref 96–108)
CHOLEST SERPL-MCNC: 205 MG/DL (ref ?–200)
CO2 SERPL-SCNC: 34 MMOL/L (ref 21–32)
CREAT SERPL-MCNC: 0.67 MG/DL (ref 0.6–1.3)
CREAT UR-MCNC: 388.4 MG/DL
ERYTHROCYTE [DISTWIDTH] IN BLOOD BY AUTOMATED COUNT: 13.6 % (ref 11.6–15.1)
ERYTHROCYTE [SEDIMENTATION RATE] IN BLOOD: 21 MM/HOUR (ref 0–29)
GFR SERPL CREATININE-BSD FRML MDRD: 96 ML/MIN/1.73SQ M
GLUCOSE P FAST SERPL-MCNC: 92 MG/DL (ref 65–99)
HCT VFR BLD AUTO: 39.6 % (ref 34.8–46.1)
HDLC SERPL-MCNC: 88 MG/DL
HGB BLD-MCNC: 13 G/DL (ref 11.5–15.4)
LDLC SERPL CALC-MCNC: 100 MG/DL (ref 0–100)
MCH RBC QN AUTO: 31.1 PG (ref 26.8–34.3)
MCHC RBC AUTO-ENTMCNC: 32.8 G/DL (ref 31.4–37.4)
MCV RBC AUTO: 95 FL (ref 82–98)
MICROALBUMIN UR-MCNC: 37.5 MG/L
MICROALBUMIN/CREAT 24H UR: 10 MG/G CREATININE (ref 0–30)
NONHDLC SERPL-MCNC: 117 MG/DL
PLATELET # BLD AUTO: 247 THOUSANDS/UL (ref 149–390)
PMV BLD AUTO: 12.7 FL (ref 8.9–12.7)
POTASSIUM SERPL-SCNC: 3.8 MMOL/L (ref 3.5–5.3)
PROT SERPL-MCNC: 7.3 G/DL (ref 6.4–8.4)
RBC # BLD AUTO: 4.18 MILLION/UL (ref 3.81–5.12)
SODIUM SERPL-SCNC: 139 MMOL/L (ref 135–147)
T4 FREE SERPL-MCNC: 1.04 NG/DL (ref 0.61–1.12)
TRIGL SERPL-MCNC: 87 MG/DL (ref ?–150)
TSH SERPL DL<=0.05 MIU/L-ACNC: 0.61 UIU/ML (ref 0.45–4.5)
WBC # BLD AUTO: 7.59 THOUSAND/UL (ref 4.31–10.16)

## 2024-11-19 PROCEDURE — 85652 RBC SED RATE AUTOMATED: CPT

## 2024-11-19 PROCEDURE — 82570 ASSAY OF URINE CREATININE: CPT

## 2024-11-19 PROCEDURE — 82043 UR ALBUMIN QUANTITATIVE: CPT

## 2024-11-19 PROCEDURE — 84439 ASSAY OF FREE THYROXINE: CPT

## 2024-11-19 PROCEDURE — 84443 ASSAY THYROID STIM HORMONE: CPT

## 2024-11-19 PROCEDURE — 80061 LIPID PANEL: CPT

## 2024-11-19 PROCEDURE — 80053 COMPREHEN METABOLIC PANEL: CPT

## 2024-11-19 PROCEDURE — 85027 COMPLETE CBC AUTOMATED: CPT

## 2024-11-19 PROCEDURE — 36415 COLL VENOUS BLD VENIPUNCTURE: CPT

## 2024-11-20 ENCOUNTER — RESULTS FOLLOW-UP (OUTPATIENT)
Dept: ENDOCRINOLOGY | Facility: CLINIC | Age: 59
End: 2024-11-20

## 2024-11-20 DIAGNOSIS — C73 MALIGNANT TUMOR OF THYROID GLAND (HCC): ICD-10-CM

## 2024-11-20 DIAGNOSIS — E89.0 POSTOPERATIVE HYPOTHYROIDISM: Primary | ICD-10-CM

## 2024-12-10 ENCOUNTER — HOSPITAL ENCOUNTER (OUTPATIENT)
Dept: INFUSION CENTER | Facility: HOSPITAL | Age: 59
Discharge: HOME | End: 2024-12-10
Attending: INTERNAL MEDICINE
Payer: MEDICARE

## 2024-12-10 VITALS — TEMPERATURE: 98.1 F

## 2024-12-10 PROCEDURE — 96523 IRRIG DRUG DELIVERY DEVICE: CPT

## 2024-12-10 NOTE — PROGRESS NOTES
Jessica Larson  tolerated port flush well with no complications.      Jessica Larson is aware of future appt on 1/21 at 10AM.     AVS printed and given to Jessica Larson: No (Declined by Jessica Larson).

## 2025-01-21 ENCOUNTER — HOSPITAL ENCOUNTER (OUTPATIENT)
Dept: INFUSION CENTER | Facility: HOSPITAL | Age: 60
End: 2025-01-21
Attending: INTERNAL MEDICINE

## 2025-01-22 ENCOUNTER — HOSPITAL ENCOUNTER (OUTPATIENT)
Dept: INFUSION CENTER | Facility: HOSPITAL | Age: 60
Discharge: HOME | End: 2025-01-22
Payer: MEDICARE

## 2025-01-22 VITALS — TEMPERATURE: 96.9 F

## 2025-01-22 PROCEDURE — 96523 IRRIG DRUG DELIVERY DEVICE: CPT

## 2025-01-22 NOTE — PROGRESS NOTES
eJssica Larson  tolerated treatment well with no complications.  Port flushed per protocol, + blood return noted.    Jessica Larson is aware of future appt on 3/5 at 11 am.     AVS declined by Jessica Larson.  Appt card provided.

## 2025-01-28 ENCOUNTER — TELEPHONE (OUTPATIENT)
Dept: OBGYN CLINIC | Facility: HOSPITAL | Age: 60
End: 2025-01-28

## 2025-01-28 NOTE — TELEPHONE ENCOUNTER
Caller: Jessica    Doctor: Sammy    Reason for call: Patient has seen you for Lyme in her knee, now her left wrist is bothering her and she is wondering if it is the same thing. She is asking if you would see her?    Please have clinical call her back either way and assist.    Phone# [phone]    SENT [employer] TO PHYSICIAN AND CLINICAL TEAM

## 2025-02-13 ENCOUNTER — HOSPITAL ENCOUNTER (OUTPATIENT)
Dept: MAMMOGRAPHY | Facility: HOSPITAL | Age: 60
End: 2025-02-13
Payer: MEDICARE

## 2025-02-13 DIAGNOSIS — Z12.31 ENCOUNTER FOR SCREENING MAMMOGRAM FOR MALIGNANT NEOPLASM OF BREAST: ICD-10-CM

## 2025-02-13 PROCEDURE — 77067 SCR MAMMO BI INCL CAD: CPT

## 2025-02-13 PROCEDURE — 77063 BREAST TOMOSYNTHESIS BI: CPT

## 2025-02-17 DIAGNOSIS — E89.0 POSTOPERATIVE HYPOTHYROIDISM: ICD-10-CM

## 2025-02-18 RX ORDER — LEVOTHYROXINE SODIUM 75 UG/1
75 TABLET ORAL DAILY
Qty: 90 TABLET | Refills: 1 | Status: SHIPPED | OUTPATIENT
Start: 2025-02-18

## 2025-02-26 ENCOUNTER — OFFICE VISIT (OUTPATIENT)
Dept: OBGYN CLINIC | Facility: CLINIC | Age: 60
End: 2025-02-26
Payer: MEDICARE

## 2025-02-26 ENCOUNTER — APPOINTMENT (OUTPATIENT)
Dept: RADIOLOGY | Facility: MEDICAL CENTER | Age: 60
End: 2025-02-26
Payer: MEDICARE

## 2025-02-26 VITALS
HEIGHT: 60 IN | BODY MASS INDEX: 19.04 KG/M2 | HEART RATE: 68 BPM | OXYGEN SATURATION: 99 % | TEMPERATURE: 98.2 F | RESPIRATION RATE: 16 BRPM | WEIGHT: 97 LBS

## 2025-02-26 DIAGNOSIS — M25.532 PAIN IN LEFT WRIST: ICD-10-CM

## 2025-02-26 DIAGNOSIS — M77.8 LEFT WRIST TENDINITIS: ICD-10-CM

## 2025-02-26 DIAGNOSIS — M65.939 FCR (FLEXOR CARPI RADIALIS) TENOSYNOVITIS: Primary | ICD-10-CM

## 2025-02-26 PROCEDURE — 99213 OFFICE O/P EST LOW 20 MIN: CPT | Performed by: STUDENT IN AN ORGANIZED HEALTH CARE EDUCATION/TRAINING PROGRAM

## 2025-02-26 PROCEDURE — 73110 X-RAY EXAM OF WRIST: CPT

## 2025-02-26 RX ORDER — FAMOTIDINE 40 MG/1
TABLET, FILM COATED ORAL
COMMUNITY
Start: 2024-12-22

## 2025-02-26 NOTE — PROGRESS NOTES
ASSESSMENT/PLAN:    1. FCR (flexor carpi radialis) tenosynovitis        2. Pain in left wrist  XR wrist 3+ vw left      3. Left wrist tendinitis  Ambulatory Referral to PT/OT Hand Therapy    Cock Up Wrist Splint          59 y.o. female presents with signs and symptoms consistent with the above diagnosis.  We discussed the natural history of this condition and its pathogenesis.  We discussed operative and nonoperative treatment options.    The anatomy and physiology of wrist tendinitis was discussed with the patient today. We discussed first line treatment is activity modification and splinting. The patient was given a removable wrist splint today and an OT rx for a custom wrist splint and dedicated therapy. I recommended the patient wear the splint for the next 6 weeks, ok to take off for hygiene and therapy. Plan to f/u in 6 weeks for reevaluation. At that time, we will re-evaluate clinically and plan to begin progressive strengthening. If the patient continues to have pain, we may consider an injection. The patient was happy with the plan.     she expressed understanding of the plan and agreed. We encouraged them to contact our office with any questions or concerns.     Patient provided a prescription for hand OT/PT. She elects for a home exercise program, she may transition to HEP as tolerated.  Cock-up wrist splint provided at time of visit.  Patient is unable to take NSAIDs or use topical NSAIDs due to history of Crohn's   Follow up in 6 weeks    _____________________________________________________  CHIEF COMPLAINT:  Left Wrist Pain     Referred By:  Nicola Alonso Md  [address]  [place] [address]    SUBJECTIVE:  Jessica Larson is a 59 y.o. right hand dominant female presenting for evaluation of left wrist. Notes generalized wrist pain that began approximately 3-6 months ago. Here to establish care. Denies numbness or tingling. The patient has not had any recent trauma or injury. 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 Christianson. However, the wrist pain seems to have worsened as of recent. She reports generalized wrist pain. She states that the pain is significantly worsened with activity. She has pain with weightbearing, wrist flexion and extension, and when lifting items. She does occasionally experience forearm pain. She states that she is active with her wrists and hands with housekeeping and baking.       ADLs: Community ambulator  Smoke: yes   ETOH: no   Drugs:  no  Job: disabled        Occupation: disabled       PAST MEDICAL HISTORY:  Past Medical History:   Diagnosis Date    Cancer (HCC)     thyroid,vulvar    Chronic pain disorder     Crohn's disease (HCC)     Disease of thyroid gland     GERD (gastroesophageal reflux disease)     Hiatal hernia     Hyperlipidemia     PONV (postoperative nausea and vomiting)        PAST SURGICAL HISTORY:  Past Surgical History:   Procedure Laterality Date    CERVICAL FUSION      C 6-7    COLONOSCOPY  2004    ,,,,    EGD      EGD      HYSTERECTOMY      INCISION AND DRAINAGE OF WOUND Right 2024    Procedure: Right knee arthroscopic I&D;  Surgeon: Boogie Christianson MD;  Location: [facility];  Service: Orthopedics    NECK SURGERY  2011     plates and screws in neck bewtween 6-7    OK INSJ TUNNELED CTR VAD W/SUBQ PORT AGE 5 YR/> Left 10/10/2019    Procedure: INSERTION VENOUS PORT (PORT-A-CATH);  Surgeon: Juan Johns MD;  Location:  MAIN OR;  Service: General    OK LAPS SURG CHOLECYSTECTOMY W/CHOLANGIOGRAPHY N/A 2018    Procedure: LAPAROSCOPIC CHOLECYSTECTOMY WITH CHOLANGIOGRAM;  Surgeon: Juan Johns MD;  Location:  MAIN OR;  Service: General    SIMPLE VULVECTOMY  2014    THYROIDECTOMY         FAMILY HISTORY:  Family History   Problem Relation Age of Onset    Crohn's disease Mother     Osteoporosis Mother             Heart disease Father     Diabetes type II  Father         Is under control    Hypertension Father     No Known Problems Sister     No Known Problems Maternal Grandmother     No Known Problems Paternal Grandmother     Hypertension Brother     Thyroid cancer Family         Me,removed       SOCIAL HISTORY:  Social History     Tobacco Use    Smoking status: Former     Current packs/day: 0.00     Average packs/day: 0.5 packs/day for 15.0 years (7.5 ttl pk-yrs)     Types: Cigarettes     Start date: 1992     Quit date: 2007     Years since quittin.1     Passive exposure: Past    Smokeless tobacco: Never    Tobacco comments:     Medical marijuana   Vaping Use    Vaping status: Never Used   Substance Use Topics    Alcohol use: Yes     Comment: occ wine    Drug use: Yes     Types: Marijuana     Comment: Medical marijuana,crohns       MEDICATIONS:    Current Outpatient Medications:     DULoxetine (CYMBALTA) 60 mg delayed release capsule, Take 1 capsule by mouth daily, Disp: , Rfl:     estradiol (ESTRACE) 0.1 mg/g vaginal cream, if needed, Disp: , Rfl:     famotidine (PEPCID) 40 MG tablet, , Disp: , Rfl:     levothyroxine 75 mcg tablet, TAKE 1 TABLET EVERY DAY, Disp: 90 tablet, Rfl: 1    Multiple Vitamin (MULTI VITAMIN DAILY PO), Take by mouth daily, Disp: , Rfl:     omeprazole (PriLOSEC) 20 mg delayed release capsule, 40 mg , Disp: , Rfl:     pravastatin (PRAVACHOL) 20 mg tablet, Take 20 mg by mouth daily, Disp: , Rfl:     tiZANidine (ZANAFLEX) 4 mg tablet, if needed, Disp: , Rfl:     valsartan-hydrochlorothiazide (DIOVAN-HCT) 80-12.5 MG per tablet, , Disp: , Rfl:     venlafaxine (EFFEXOR-XR) 150 mg 24 hr capsule, Take 150 mg by mouth daily, Disp: , Rfl:     HYDROcodone-acetaminophen (NORCO) 5-325 mg per tablet, Take 1 tablet by mouth every 6 (six) hours as needed for pain Max Daily Amount: 4 tablets (Patient not taking: Reported on 10/9/2024), Disp: 10 tablet, Rfl: 0    ALLERGIES:  Allergies   Allergen Reactions    Humira [Adalimumab] Rash     Mercaptopurine Rash     Cancer occurrence--this medication is for Chrohn's disease    Penicillins Lightheadedness    Remicade [Infliximab] Rash    Skyrizi [Risankizumab] Dizziness and Lightheadedness    Vedolizumab Diarrhea    Penicillin G Rash       REVIEW OF SYSTEMS:  Pertinent items are noted in HPI.  A comprehensive review of systems was negative.    LABS:  HgA1c:   Lab Results   Component Value Date    HGBA1C 5.4 05/17/2024     BMP:   Lab Results   Component Value Date    CALCIUM 9.2 11/19/2024    K 3.8 11/19/2024    CO2 34 (H) 11/19/2024    CL 99 11/19/2024    BUN 13 11/19/2024    CREATININE 0.67 11/19/2024         _____________________________________________________  PHYSICAL EXAMINATION:  Pulse 68   Temp 98.2 °F (36.8 °C) (Temporal)   Resp 16   Ht 5' (1.524 m)   Wt 44 kg (97 lb)   SpO2 99%   BMI 18.94 kg/m²     General: Well developed and well nourished, alert & oriented x 3, appears comfortable   Psychiatric: Normal   HEENT: Normocephalic, Atraumatic Trachea Midline, No torticollis   Pulmonary: No audible wheezing or respiratory distress   Abdomen/GI: Non tender, non distended   Cardiovascular: No pitting edema, 2+ radial pulse         MUSCULOSKELETAL EXAMINATION:  Left Wrist   Skin intact  No soft tissue swelling    FCR tendinitis / tenderness   Positive bowstring of the FCR  Passive extension of wrist which radiates up the forearm  Mild pain over the medial epicondyle    TTP:  Radial styloid: no   Scaphoid tubercle: no   Anatomic snuff box: no  SL interval: no   Ulnocarpal joint: no   Pisotriquetral compression: no   ECU: no   Fovea sign: negative   DRUJ stable in pronation, neutral, and pronation.   Range of Motion:  Elbow: extension/flexion 0/140  Forearm: pronation/supination 80/80  Wrist: extension/flexion 70/70  Digit: full AROM in DIP, PIP, MP joints  Neurovascular:  Motor Exam: firing AIN/PIN/U.   Sensory Exam: Sensation intact to light touch in FDWS (radial), volar IF (median), volar SF  (ulnar)  Vascular Exam: < 2 sec capillary refill     _____________________________________________________  STUDIES REVIEWED:  Radiographs: I personally reviewed and independently interpreted the available radiographs.  Left wrist x-rays completed today on 2/26/2025 demonstrate no evidence of fracture, dislocation, or other osseous abnormality      Armani Cedillo MD  Hand and Upper Extremity Surgery      *This note was dictated using Dragon voice recognition software. Please excuse any word substitutions or errors.*     Scribe Attestation      I,:   am acting as a scribe while in the presence of the attending physician.:       I,:   personally performed the services described in this documentation    as scribed in my presence.:

## 2025-02-26 NOTE — PROGRESS NOTES
ASSESSMENT/PLAN:    1. Left wrist tendinitis  Ambulatory Referral to PT/OT Hand Therapy    Cock Up Wrist Splint      2. Pain in left wrist  XR wrist 3+ vw left          59 y.o. female presents with signs and symptoms consistent with the above diagnosis.  We discussed the natural history of this condition and its pathogenesis.  We discussed operative and nonoperative treatment options.  ***    she expressed understanding of the plan and agreed. We encouraged them to contact our office with any questions or concerns.       _____________________________________________________  CHIEF COMPLAINT:  Left Wrist Pain     Referred By:  Nicola Alonso Md  [address]  [place] [address]    SUBJECTIVE:  Jessica Larson is a 59 y.o. right hand dominant female presenting for evaluation of left wrist. Notes generalized wrist pain that began approximately 3-6 months ago. Here to establish care. Denies numbness or tingling. The patient has not had any recent trauma or injury. She did have a lyme flare-up which affected her knee, she underwent an I&D with Dr. Christianson. However, the wrist pain seems to have worsened as of recent. She reports generalized wrist pain. She states that the pain is significantly worsened with activity. She has pain with weightbearing, wrist flexion and extension, and when lifting items. She does occasionally experience forearm pain. She states that she is active with her wrists and hands with housekeeping and baking.       ADLs: Community ambulator  Smoke: yes   ETOH: no   Drugs:  no  Job: disabled        Occupation: disabled       PAST MEDICAL HISTORY:  Past Medical History:   Diagnosis Date   • Cancer (HCC)     thyroid,vulvar   • Chronic pain disorder    • Crohn's disease (HCC)    • Disease of thyroid gland    • GERD (gastroesophageal reflux disease)    • Hiatal hernia    • Hyperlipidemia    • PONV (postoperative nausea and vomiting)        PAST SURGICAL HISTORY:  Past Surgical History:    Procedure Laterality Date   • CERVICAL FUSION      C 6-7   • COLONOSCOPY  2004    ,,,,   • EGD     • EGD     • HYSTERECTOMY     • INCISION AND DRAINAGE OF WOUND Right 2024    Procedure: Right knee arthroscopic I&D;  Surgeon: Boogie Christianson MD;  Location: [facility];  Service: Orthopedics   • NECK SURGERY       plates and screws in neck bewtween 6-7   • [place] INSJ TUNNELED CTR VAD W/SUBQ PORT AGE 5 YR/> Left 10/10/2019    Procedure: INSERTION VENOUS PORT (PORT-A-CATH);  Surgeon: Juan Johns MD;  Location:  MAIN OR;  Service: General   • UT LAPS SURG CHOLECYSTECTOMY W/CHOLANGIOGRAPHY N/A 2018    Procedure: LAPAROSCOPIC CHOLECYSTECTOMY WITH CHOLANGIOGRAM;  Surgeon: Juan Johns MD;  Location:  MAIN OR;  Service: General   • SIMPLE VULVECTOMY  2014   • THYROIDECTOMY         FAMILY HISTORY:  Family History   Problem Relation Age of Onset   • Crohn's disease Mother    • Osteoporosis Mother            • Heart disease Father    • Diabetes type II Father         Is under control   • Hypertension Father    • No Known Problems Sister    • No Known Problems Maternal Grandmother    • No Known Problems Paternal Grandmother    • Hypertension Brother    • Thyroid cancer Family         Me,removed       SOCIAL HISTORY:  Social History     Tobacco Use   • Smoking status: Former     Current packs/day: 0.00     Average packs/day: 0.5 packs/day for 15.0 years (7.5 ttl pk-yrs)     Types: Cigarettes     Start date: 1992     Quit date: 2007     Years since quittin.1     Passive exposure: Past   • Smokeless tobacco: Never   • Tobacco comments:     Medical marijuana   Vaping Use   • Vaping status: Never Used   Substance Use Topics   • Alcohol use: Yes     Comment: occ wine   • Drug use: Yes     Types: Marijuana     Comment: Medical marijuana,crohns       MEDICATIONS:    Current Outpatient Medications:   •  DULoxetine (CYMBALTA) 60 mg delayed release capsule,  Take 1 capsule by mouth daily, Disp: , Rfl:   •  estradiol (ESTRACE) 0.1 mg/g vaginal cream, if needed, Disp: , Rfl:   •  famotidine (PEPCID) 40 MG tablet, , Disp: , Rfl:   •  levothyroxine 75 mcg tablet, TAKE 1 TABLET EVERY DAY, Disp: 90 tablet, Rfl: 1  •  Multiple Vitamin (MULTI VITAMIN DAILY PO), Take by mouth daily, Disp: , Rfl:   •  omeprazole (PriLOSEC) 20 mg delayed release capsule, 40 mg , Disp: , Rfl:   •  pravastatin (PRAVACHOL) 20 mg tablet, Take 20 mg by mouth daily, Disp: , Rfl:   •  tiZANidine (ZANAFLEX) 4 mg tablet, if needed, Disp: , Rfl:   •  valsartan-hydrochlorothiazide (DIOVAN-HCT) 80-12.5 MG per tablet, , Disp: , Rfl:   •  venlafaxine (EFFEXOR-XR) 150 mg 24 hr capsule, Take 150 mg by mouth daily, Disp: , Rfl:   •  HYDROcodone-acetaminophen (NORCO) 5-325 mg per tablet, Take 1 tablet by mouth every 6 (six) hours as needed for pain Max Daily Amount: 4 tablets (Patient not taking: Reported on 10/9/2024), Disp: 10 tablet, Rfl: 0    ALLERGIES:  Allergies   Allergen Reactions   • Humira [Adalimumab] Rash   • Mercaptopurine Rash     Cancer occurrence--this medication is for Chrohn's disease   • Penicillins Lightheadedness   • Remicade [Infliximab] Rash   • Skyrizi [Risankizumab] Dizziness and Lightheadedness   • Vedolizumab Diarrhea   • Penicillin G Rash       REVIEW OF SYSTEMS:  Pertinent items are noted in HPI.  A comprehensive review of systems was negative.    LABS:  HgA1c:   Lab Results   Component Value Date    HGBA1C 5.4 05/17/2024     BMP:   Lab Results   Component Value Date    CALCIUM 9.2 11/19/2024    K 3.8 11/19/2024    CO2 34 (H) 11/19/2024    CL 99 11/19/2024    BUN 13 11/19/2024    CREATININE 0.67 11/19/2024         _____________________________________________________  PHYSICAL EXAMINATION:  Pulse 68   Temp 98.2 °F (36.8 °C) (Temporal)   Resp 16   Ht 5' (1.524 m)   Wt 44 kg (97 lb)   SpO2 99%   BMI 18.94 kg/m²     General: Well developed and well nourished, alert & oriented x 3,  "appears comfortable   Psychiatric: Normal   HEENT: Normocephalic, Atraumatic Trachea Midline, No torticollis   Pulmonary: No audible wheezing or respiratory distress   Abdomen/GI: Non tender, non distended   Cardiovascular: No pitting edema, 2+ radial pulse     Cervcial Spine: ***Negative Spurling's    MUSCULOSKELETAL EXAMINATION:  Left Wrist   {***:19197::\"Skin intact\",\"Incisions well healed\"}  No soft tissue swelling    ***fcr tendinitis / tenderness   Positive bowstring   Passive extension of wrist     {Side Examined:02265}  TTP:  Radial styloid: {***:19197::\"yes\",\"no\"}   Scaphoid tubercle: {***:19197::\"yes\",\"no\"}   Anatomic snuff box: {***:19197::\"yes\",\"no\"}  SL interval: {***:19197::\"yes\",\"no\"}   Ulnocarpal joint: {***:19197::\"yes\",\"no\"}   Pisotriquetral compression: {***:19197::\"yes\",\"no\"}   ECU: {***:19197::\"yes\",\"no\"}   Fovea sign: {***:19197::\"positive\",\"negative\"}   DRUJ {***:19197::\"stable in pronation, neutral, and pronation.\"}   Range of Motion:  Elbow: extension/flexion {***:19197::\"0\"}/{***:19197::\"140\"}  Forearm: pronation/supination {***:19197::\"80\"}/{***:19197::\"80\"}  Wrist: extension/flexion {***:19197::\"70\"}/{***:19197::\"70\"}  Digit: {***:19197::\"full AROM in DIP, PIP, MP joints\"}  Neurovascular:  Motor Exam: firing AIN/PIN/U. ***/5 motor ***  Sensory Exam: {***:88279::\"Sensation intact to light touch in FDWS (radial), volar IF (median), volar SF (ulnar)\"}  Vascular Exam: {***:84506::\"2+ radial pulse, < 2 sec capillary refill\",\"< 2 sec capillary refill\"}     _____________________________________________________  STUDIES REVIEWED:  Radiographs: I personally reviewed and independently interpreted the available radiographs.  ***: Radiographs of the ***, multiple views, demonstrate ***.       Armani Cedillo MD  Hand and Upper Extremity Surgery      *This note was dictated using Dragon voice recognition software. Please excuse any word substitutions or errors.*     Scribe Attestation    I,:   am " acting as a scribe while in the presence of the attending physician.:       I,:   personally performed the services described in this documentation    as scribed in my presence.:

## 2025-02-28 PROBLEM — M65.939 FCR (FLEXOR CARPI RADIALIS) TENOSYNOVITIS: Status: ACTIVE | Noted: 2025-02-28

## 2025-03-05 ENCOUNTER — HOSPITAL ENCOUNTER (OUTPATIENT)
Dept: INFUSION CENTER | Facility: HOSPITAL | Age: 60
Discharge: HOME | End: 2025-03-05
Payer: MEDICARE

## 2025-03-05 PROCEDURE — 96523 IRRIG DRUG DELIVERY DEVICE: CPT

## 2025-03-05 NOTE — PROGRESS NOTES
Jessica Larson  tolerated port flush well with no complications.      Jessica Larson is aware of future appt on 4/16 at 11AM.     AVS printed and given to Jessica Larson: No (Declined by Jessica Larson).

## 2025-03-06 ENCOUNTER — EVALUATION (OUTPATIENT)
Dept: OCCUPATIONAL THERAPY | Facility: CLINIC | Age: 60
End: 2025-03-06
Payer: MEDICARE

## 2025-03-06 DIAGNOSIS — M77.8 LEFT WRIST TENDINITIS: Primary | ICD-10-CM

## 2025-03-06 PROCEDURE — 97166 OT EVAL MOD COMPLEX 45 MIN: CPT

## 2025-03-06 PROCEDURE — 97110 THERAPEUTIC EXERCISES: CPT

## 2025-03-06 NOTE — PROGRESS NOTES
Occupational Therapy Initial Evaluation      Visit/Unit Tracking  AUTH Status: Not Required Date 3/6/25              Visits  Authed: BOMN Used 1 (IE)               FOTO COUNT  Completed                 PLAN OF CARE START: 3/6/25   PLAN OF CARE END: 3/18/2025 (Fabricating L wrist cock up)   PROGRESS NOTE DUE/FOTO DUE: Pt requested one visit with HEP   FREQUENCY: 1 additional appointment to fabricate L wrist cock up   PRECAUTIONS: lyme disease, chron's disease, osteoporosis   DIAGNOSIS: L wrist tendonitis (flexor carpi radialis tenosynovitis)  INSURANCE: Medicare/Medicaid     COCK UP WRIST SPLINT, provide compression glove     Today's date: 3/6/2025  Patient name: Jessica Larson  : 1965  MRN: [id number]  Referring provider: Armani Cedillo MD  Dx:   Encounter Diagnosis     ICD-10-CM    1. Left wrist tendinitis  M77.8 Ambulatory Referral to PT/OT Hand Therapy             ASSESSMENT/PLAN:    SKILLED ANALYSIS:  Pt is a R hand dominant 59 year old female presenting to OP OT s/p L wrist tendonitis (flexor carpi radialis tenosynovitis). Pt currently reports difficulty with functional reach for showering, difficulty picking up and setting down objects with uln/rad deviation. Pt presents with swelling over radial side of wrist.  Post assessments pt demonstrating the following: decreased L radial thumb abduction,  strength, pinch strength, wrist ROM in all planes, and dexterity. Pt requested HEP to perform at home as alternative to attending formal therapy. Provided pt with HEP to perform repetitions as tolerated with strengthening as tolerated. Provided wrist stretched for flex/ext, and wrist ROM in all planes. Additionally recommended dart throwers. Plan to create custom wrist cock up on 3/18/25. Will check in with pt on that date for HEP. Educated on importance of rest, joint protection, and ice/heat modalities. Findings and recommendations discussed with pt, and they are in agreement. Educated pt on charges of  "insurance, assessments performed with standardized norms, POC, goal creation, and OP OT services.       SUBJECTIVE:    Subjective  Occupational Profile  *lives with:    *vocation: housewife   *driving:  currently drives   *ADLs: difficulty with hooking bra, challenges with washing during showering  *IADLs (cooking, cleaning, community mobility, medication management, finances): reports difficulty with functional grasp to  a pan   *Sport/Leisure: gardening       PATIENT GOAL: \"To get better before I have to garden and use the push mower\"    PAIN: 5    Worst: 9/10     HISTORY OF PRESENT ILLNESS:   Pt is a 59 y.o. female who was referred to Occupational Therapy s/p (flexor carpi radialis tenosynovitis) with resulting L wrist pain. Pt saw Dr. Cedillo on 2/26/25 with report stating, \"The anatomy and physiology of wrist tendinitis was discussed with the patient today. We discussed first line treatment is activity modification and splinting. The patient was given a removable wrist splint today and an OT rx for a custom wrist splint and dedicated therapy. I recommended the patient wear the splint for the next 6 weeks, ok to take off for hygiene and therapy. Plan to f/u in 6 weeks for reevaluation. At that time, we will re-evaluate clinically and plan to begin progressive strengthening. If the patient continues to have pain, we may consider an injection. The patient was happy with the plan. She expressed understanding of the plan and agreed. We encouraged them to contact our office with any questions or concerns. Patient provided a prescription for hand OT/PT. She elects for a home exercise program, she may transition to HEP as tolerated. Cock-up wrist splint provided at time of visit. Patient is unable to take NSAIDs or use topical NSAIDs due to history of Crohn's. Follow up in 6 weeks. P notes generalized wrist pain that started 3-6 months ago. However, the wrist pain seems to have worsened as of recent. " "She reports generalized wrist pain. She states that the pain is significantly worsened with activity. She has pain with weightbearing, wrist flexion and extension, and when lifting items. She does occasionally experience forearm pain. She states that she is active with her wrists and hands with housekeeping and baking.\"          PMH:   Past Medical History:   Diagnosis Date    Cancer (HCC)     thyroid,vulvar    Chronic pain disorder     Crohn's disease (HCC)     Disease of thyroid gland     GERD (gastroesophageal reflux disease)     Hiatal hernia     Hyperlipidemia     PONV (postoperative nausea and vomiting)        Past Surgical Hx:   Past Surgical History:   Procedure Laterality Date    CERVICAL FUSION      C 6-7    COLONOSCOPY  2004    2006,2012,2014,2016,2018    EGD  2012    EGD  2016    HYSTERECTOMY  2013    INCISION AND DRAINAGE OF WOUND Right 8/16/2024    Procedure: Right knee arthroscopic I&D;  Surgeon: Boogie Christianson MD;  Location: [facility];  Service: Orthopedics    NECK SURGERY  2011     plates and screws in neck bewtween 6-7    CT INSJ TUNNELED CTR VAD W/SUBQ PORT AGE 5 YR/> Left 10/10/2019    Procedure: INSERTION VENOUS PORT (PORT-A-CATH);  Surgeon: Juan Johns MD;  Location:  MAIN OR;  Service: General    CT LAPS SURG CHOLECYSTECTOMY W/CHOLANGIOGRAPHY N/A 9/27/2018    Procedure: LAPAROSCOPIC CHOLECYSTECTOMY WITH CHOLANGIOGRAM;  Surgeon: Juan Johns MD;  Location:  MAIN OR;  Service: General    SIMPLE VULVECTOMY  12/24/2014    THYROIDECTOMY            OBJECTIVE:    Impairment Observations     DeQuervain’s Tenosynovitis (POSITIVE)   Finkelstein: thumb flexion where the patient is asked to make a fist with the thumb tucked inside the palm and then ulnar deviation where the patient then bends the wrist toward the little finger and pain indication on the thumb side of the wrist, it is considered a positive Finkelstein test, which is suggestive of De Quervain's tenosynovitis      Edema " (circumferential) (cm):    Right Left   Wrist crease 13.4 cm 14.2 cm          R UE Status on IE:3/6/25 L UE Status on IE: 3/6/25 Comments           UPPER EXTREMITY FUNCTION   Intact Impaired Dominant Hand: R     /PINCH STRENGTH             Dynamometer    - Gross Grasp   54 lbs 15 lbs    Pinch Meter     - Pincer   4 lbs 1 lbs     - Tripod   5 lbs 1 lbs     - Lateral   11 lbs  6 lbs            Thumb AROM              RUE Status on IE  3/6/25 LUE Status on IE  3/6/25 Comments    Intact Impaired    1st Digit MCP  Normal: 0-60 65 45    1st Digit IP:  Normal: 0-80 65 70    Opposition  WFL WFL    Thumb CMC Palmar Abduction (hand neutral on table)   Normal: 0-45 70 60    Thumb CMC Radial Abduction (palm flat on table)  Normal: 0-70 82 62    Thumb CMC Flexion (thumb to palmar pinky) ~ can use cm to measure distance from thumb to distal palmar crease   Normal: 0-15 Full Full          AROM UE              RUE Status on IE  3/6/25 LUE Status on IE  3/6/25     Intact Impaired    Elbow Flex  Normal: 0-150 Full  Full     Elbow Ext  Normal: 0 Full  Full     Pronation  Normal: 0-90 Full  Full     Supination  Normal: 0-90 Full  Full     Wrist Flex   Normal: 0-80  70  46     Wrist Ext  Normal: 0-70  60  45     Ulnar Deviation  Normal: 0-30 40 15    Radial Deviation  Normal: 0-20 35 10    Digit Flex WFL  WFL     Digit Ex WFL  WFL           COORDINATION       RUE Status on IE  3/6/25 LUE Status on IE  3/6/25     Intact Impaired    9 Hole Peg Test-  assesses dexterity/fine motor coordination        22 seconds 22 seconds     Pt demonstrates decreased FMC compared to norms for age/sex     Fxnl Dexterity Test-assesses patient's ability to use the hand for daily tasks requiring a 3-jaw chrissy prehension between the fingers and the thumb     20 seconds 24 seconds     Pt demonstrates decreased dexterity compared to norms for age/sex          GOALS    Pt will be consistent with HEP demonstrated by teach back method for increased ROM and  strengthening of LUE for functional activities and life roles ~ Met     Pt will demonstrate understanding of protocols and precautions post surgery by teach back method to maintain and protect integrity of L wrist for recovery.     Pt will be compliant with splint wear schedule and demonstrate proper knowledge of splint care and donning and doffing splint for improved healing for increased QOL.           INTERVENTIONS:     IASTM  Activity modification/compensation techniques   Kinesiotaping  Massage  Orthotic management/fitting/training  Compression  Coordination  Cupping  Strengthening  Stretching   Therapeutic activities  Therapeutic exercise  HEP  Ultrasound  Paraffin bath   Cryotherapy   Moist Heat  Neuromuscular stimulation   TENS  Hydrocollator packs     PROTOCOL:   FLEXOR CARPI RADIALIS TENDINITS - TENOSYNOVITIS  Non-Surgical Management    DESCRIPTION OF DIAGNOSIS  Flexor carpi radialis (FCR) tendinitis is a tenosynovitis of the flexor carpi radialis along the FCR tendon sheath. As the FCR crosses the ridge of the trapezium it then inserts in three locations. It primarily inserts at the base of the 2nd metacarpal (80%) with remainder at the base of the 3rd metacarpal (20%). There is a very small slip that inserts into the trapezial crest or tuberosity. The tendon becomes vulnerable to an inflammatory response secondary to the restricting nature of the anatomical site. Primarily, the condition is a stenosing tenosynovitis within the fibroosseous tunnel, which is separate from the carpal canal. Secondarily, it is a tendinitis associated with a scaphoid fracture or cyst, distal radius fracture, STT joint arthritis or thumb CMC joint arthritis. In addition, it is seen in rheumatoid patients. Steroid injections tend to be more successful for primary tenosynovitis as opposed to secondary to conditions such as healed fractures and/or arthritic changes with osteophytes and associated bony changes.Pain is  predominately noted along the volar wrist flexion crease at the site of the scaphoid tubercle. Both pain and crepitus may be present. Pain is aggravated with direct palpation, passive wrist extension and/or resistive wrist flexion. One risk factor for developing FCR tendinitis/tenosynovitis is repetitive wrist flexion. This occurs in manual laborers, golfers and performing activities such as knitting/tierra. FCR tendinitis is most frequently seen in women, 50 years of age and older. Degenerative arthritis of the CMC joint may be one aggravating factor.    NON-SURGICAL MANAGEMENT - THERAPY  0 - 6 Weeks  A custom-fabricated wrist immobilization orthosis is fitted, positioning the wrist in 10º of palmar flexion (with no direct pressure from the orthotic material on the trapezium), for continual wear. Should STT arthritis be noted, consider a custom-fabricated wrist and thumb static orthosis in place of the wrist immobilization orthosis. The orthosis is removed 3-4 times a day for light ADLs.  Although rarely noted, if localized edema is present along the wrist, icing may be performed 1-2 times a day and/or contrast baths (albeit these authors have not found identifiable benefit from contrast baths). The goal is to reduce the inflammatory response. Patient education is recommended. To advise the patient to minimize repetitive activities requiring sustained pinch and pulling with the involved hand is encouraged. In addition, patients are encouraged to avoid grasping activities which demand resistance with strong radial deviation and simultaneous wrist extension.    3 Weeks  Gentle, passive wrist extension may be performed 2-3 times a day, 10 repetitions with gentle, long endrange stretch for 30 seconds, both in supination and pronation. In addition, the passive wrist extension stretches are performed with elbow extension. Moist heat for ± 15 minutes prior to the gentle passive stretches is encouraged. The goal is to  gradually maximize the flexibility of the FCR muscle-tendon unit.] Iontophoresis or phonophoresis are considerations, should pain remain unchanged.    CONSIDERATIONS  Typically, the patient is re-evaluated by the physician ± 6 weeks following the initiation of non-surgical management. If the patient has become asymptomatic then conservative treatment may be gradually eliminated, including the orthosis. The gradual transition is over a period of one to two weeks. If the pain has reduced but is not resolved the physician may opt for continued conservative care. If the patient remains painful and the immobilization has not been helpful, surgical intervention may be considered. Patients frequently benefit from non-surgical management for FCR tendinitis.     ORTHO ORDERS:  Evaluate and treat for FCR tendinitis; WRIST COCK UP SPLINT            Manuals        Digit PROM  Flex/Ext        Scar Massage        IASTM                Neuro Re-Ed                                                                 Ther Ex        Blocking  DIP  PIP  MCP        Tendon [place]        FDS [place]        Wrist Flex/Ext        Wrist Tenodesis        Digi-Flex        Hand Power Pro                Ther Activity        Tension Pin [place]        Grooved Peg Board                                Modalities        Ultrasound                CP        E-STIM        Splint Fabrication

## 2025-03-10 NOTE — PROGRESS NOTES
Patient pulling off oxygen despite multiple attempts to educated on need for oxygen. Patient placed in bilateral soft wrist restraints to maintain patient safety.    Post-Op Note - General Surgery   Guicho Davidson 47 y o  female MRN: [id number]  Encounter: [id number]    Assessment/Plan    Encounter for care related to vascular access port  Patient well after left subclavian MediPort insertion under fluoroscopy  Partial wound separation improved since last week, wound care instructions reviewed  No signs of infection  Ongoing sharp tenderness superior and lateral to the port of unclear etiology, likely musculoskeletal and inflammatory in nature  Will have her return in a few weeks for recheck with the expectation that it should resolve spontaneously  Has upcoming appointment for scheduled infusion  Copy of the operative note provided and reviewed  Questions answered, follow-up arranged  Diagnoses and all orders for this visit:    Encounter for care related to vascular access port    Other orders  -     lidocaine-prilocaine (EMLA) cream; APPLY A SMALL AMOUNT OVER PORT SITE 30 60 MINUTES PRIOR TO USE        Subjective      Chief Complaint   Patient presents with    Follow-up     mediport insertion 2103-2025     10- Patient is here today in follow up Mediport insertion 10-  Patient states that when she lifts her left arm a certain way she gets a stabbing pain at her Mediport site and the incision area is still open a little  [place]    Pleasant 49-year-old female with history Crohn's disease here after MediPort insertion to facilitate Entyvio infusions  Was seen 1 week postop for partial wound separation, this has been stable without drainage  Has ongoing pain just above into the left of the port site worse with palpation and movement  No fevers, chest pain, difficulty breathing  Review of Systems   Constitutional: Negative for chills and fever  Respiratory: Negative for shortness of breath  Cardiovascular: Negative for chest pain  Gastrointestinal: Negative for abdominal pain  Skin: Negative for rash     Neurological: Negative for headaches  The following portions of the patient's history were reviewed and updated as appropriate: allergies, current medications, past family history, past medical history, past social history, past surgical history and problem list     Objective      There were no vitals taken for this visit  Physical Exam   Constitutional: She is oriented to person, place, and time  She appears well-developed and well-nourished  No distress  HENT:   Head: Normocephalic and atraumatic  Eyes: Pupils are equal, round, and reactive to light  Conjunctivae and EOM are normal    Neck: Normal range of motion  Pulmonary/Chest: No respiratory distress  Musculoskeletal: Normal range of motion  Neurological: She is alert and oriented to person, place, and time  Skin: Skin is warm and dry  Capillary refill takes less than 2 seconds  She is not diaphoretic  Psychiatric: She has a normal mood and affect   Her behavior is normal        Signature:  Camron Feliciano PA-C  Date: 10/23/2019 Time: 11:55 AM

## 2025-03-18 ENCOUNTER — APPOINTMENT (OUTPATIENT)
Dept: OCCUPATIONAL THERAPY | Facility: CLINIC | Age: 60
End: 2025-03-18
Payer: MEDICARE

## 2025-03-20 ENCOUNTER — OFFICE VISIT (OUTPATIENT)
Dept: OCCUPATIONAL THERAPY | Facility: CLINIC | Age: 60
End: 2025-03-20
Payer: MEDICARE

## 2025-03-20 DIAGNOSIS — M77.8 LEFT WRIST TENDINITIS: Primary | ICD-10-CM

## 2025-03-20 PROCEDURE — 97760 ORTHOTIC MGMT&TRAING 1ST ENC: CPT

## 2025-03-20 PROCEDURE — L3906 WHO W/O JOINTS CF: HCPCS

## 2025-03-20 NOTE — PROGRESS NOTES
"Daily Note         Visit/Unit Tracking  AUTH Status: Not Required Date 3/6/25 3/20/25             Visits  Authed: BOMN Used 1 (IE)  2             FOTO COUNT  Completed                 PLAN OF CARE START: 3/6/25   PLAN OF CARE END: 3/20/2025 (Fabricating L wrist cock up)   PROGRESS NOTE DUE/FOTO DUE: Pt requested one visit with HEP   FREQUENCY: 1 additional appointment to fabricate L wrist cock up   PRECAUTIONS: lyme disease, chron's disease, osteoporosis   DIAGNOSIS: L wrist tendonitis (flexor carpi radialis tenosynovitis)  INSURANCE: Medicare/Medicaid     COCK UP WRIST SPLINT, provide compression glove     Today's date: 3/20/2025  Patient name: Jessica Larson  : 1965  MRN: [id number]  Referring provider: Armani Cedillo MD  Dx:   Encounter Diagnosis     ICD-10-CM    1. Left wrist tendinitis  M77.8                          Subjective: \"I'm just getting over being sick\"       Objective: See treatment below.    Fabricated wrist cock up orthosis for wrist immobilization. Splint was 3/4th width of forearm with thenar eminence free for mobility with splint extending 3/4th length of forearm. Made adjustments as necessary.         Assessment: Tolerated treatment well. Focus of session on splint fabrication. Pt able to rosina and doff splint independently. Educated pt on splint hygiene and wear schedule. Pt demonstrated good understanding. Pt discharged on today's date with HEP for L wrist tendonitis with wrist exercises and wrist cock up splint.       Plan: Continued skilled OT per POC.      "

## 2025-03-31 ENCOUNTER — APPOINTMENT (OUTPATIENT)
Dept: LAB | Facility: CLINIC | Age: 60
End: 2025-03-31
Payer: MEDICARE

## 2025-03-31 DIAGNOSIS — C73 MALIGNANT TUMOR OF THYROID GLAND (HCC): ICD-10-CM

## 2025-03-31 DIAGNOSIS — E89.0 POSTOPERATIVE HYPOTHYROIDISM: ICD-10-CM

## 2025-03-31 LAB
T4 FREE SERPL-MCNC: 1.14 NG/DL (ref 0.61–1.12)
TSH SERPL DL<=0.05 MIU/L-ACNC: 0.32 UIU/ML (ref 0.45–4.5)

## 2025-03-31 PROCEDURE — 84439 ASSAY OF FREE THYROXINE: CPT

## 2025-03-31 PROCEDURE — 86800 THYROGLOBULIN ANTIBODY: CPT

## 2025-03-31 PROCEDURE — 36415 COLL VENOUS BLD VENIPUNCTURE: CPT

## 2025-03-31 PROCEDURE — 84443 ASSAY THYROID STIM HORMONE: CPT

## 2025-03-31 PROCEDURE — 84432 ASSAY OF THYROGLOBULIN: CPT

## 2025-04-01 ENCOUNTER — RESULTS FOLLOW-UP (OUTPATIENT)
Dept: ENDOCRINOLOGY | Facility: CLINIC | Age: 60
End: 2025-04-01

## 2025-04-01 LAB
THYROGLOB AB SERPL-ACNC: <1 IU/ML (ref 0–0.9)
THYROGLOB SERPL-MCNC: <0.1 NG/ML (ref 1.5–38.5)

## 2025-04-09 ENCOUNTER — OFFICE VISIT (OUTPATIENT)
Dept: ENDOCRINOLOGY | Facility: CLINIC | Age: 60
End: 2025-04-09
Payer: MEDICARE

## 2025-04-09 VITALS
HEIGHT: 61 IN | BODY MASS INDEX: 19.26 KG/M2 | OXYGEN SATURATION: 99 % | RESPIRATION RATE: 18 BRPM | WEIGHT: 102 LBS | HEART RATE: 71 BPM | SYSTOLIC BLOOD PRESSURE: 118 MMHG | TEMPERATURE: 97.7 F | DIASTOLIC BLOOD PRESSURE: 74 MMHG

## 2025-04-09 DIAGNOSIS — M81.0 AGE-RELATED OSTEOPOROSIS WITHOUT CURRENT PATHOLOGICAL FRACTURE: ICD-10-CM

## 2025-04-09 DIAGNOSIS — C73 MALIGNANT TUMOR OF THYROID GLAND (HCC): ICD-10-CM

## 2025-04-09 DIAGNOSIS — E89.0 POSTOPERATIVE HYPOTHYROIDISM: Primary | ICD-10-CM

## 2025-04-09 PROCEDURE — 99214 OFFICE O/P EST MOD 30 MIN: CPT | Performed by: NURSE PRACTITIONER

## 2025-04-09 PROCEDURE — G2211 COMPLEX E/M VISIT ADD ON: HCPCS | Performed by: NURSE PRACTITIONER

## 2025-04-09 RX ORDER — ZOLEDRONIC ACID 0.05 MG/ML
5 INJECTION, SOLUTION INTRAVENOUS ONCE
OUTPATIENT
Start: 2025-05-22

## 2025-04-09 RX ORDER — SODIUM CHLORIDE 9 MG/ML
20 INJECTION, SOLUTION INTRAVENOUS ONCE
OUTPATIENT
Start: 2025-05-22

## 2025-04-09 NOTE — ASSESSMENT & PLAN NOTE
Due for Reclast infusion in May 2025. Due for repeat DEXA scan in July 2025. No recent falls or fractures. Continue daily multivitamin. Continue with weight bearing activity.  Orders:    DXA bone density spine hip and pelvis; Future

## 2025-04-09 NOTE — ASSESSMENT & PLAN NOTE
No evidence of recurrence of disease. Thyroglobulin remains undetectable. Repeat in April 2026.

## 2025-04-09 NOTE — PROGRESS NOTES
Name: Jessica Larson      : 1965      MRN: [id number]  Encounter Provider: NICOLAS Burt  Encounter Date: 2025   Encounter department: [facility] [place]  :  Assessment & Plan  Postoperative hypothyroidism  TSH suppressed with mildly elevated free T4. Patient notes increased fatigue and intermittent palpitations. Continue 75 mcg of levothyroxine Monday-Saturday. Take 1/2 tablet on . Repeat labs in 8 weeks.   Orders:    TSH, 3rd generation; Future    T4, free; Future    Age-related osteoporosis without current pathological fracture  Due for Reclast infusion in May 2025. Due for repeat DEXA scan in 2025. No recent falls or fractures. Continue daily multivitamin. Continue with weight bearing activity.  Orders:    DXA bone density spine hip and pelvis; Future    Malignant tumor of thyroid gland (HCC)  No evidence of recurrence of disease. Thyroglobulin remains undetectable. Repeat in 2026.             History of Present Illness   HPI  Jessica Larson is a 59 y.o. female with a history of thyroid CA s/p thyroidectomy, postsurgical hypothyroidism, and osteoporosis presenting to the office today for follow-up.  Past medical history significant for Crohn's disease.     Patient underwent a total thyroidectomy in 2007.  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  To date, no evidence of recurrence of disease.    For postoperative hypothyroidism, she is taking 75 mcg of levothyroxine daily.    TSH low at 0.321 with a mildly elevated free T4 of 1.14.   TSH 3RD GENERATON   Latest Ref Rng 0.450 - 4.500 uIU/mL   2024 0.614    3/31/2025 0.321 (L)       Legend:  (L) Low   FREE T4   Latest Ref Rng 0.61 - 1.12 ng/dL   2024 1.04    3/31/2025 1.14 (H)       Legend:  (H) High  Thyroglobulin remains undetectable.     Thyroglobulin-VAISHALI   Latest Ref Rng 1.5 - 38.5 ng/mL   3/31/2025 <0.1 (L)      "  Legend:  (L) Low    Last ultrasound performed on 3/2/2012.  This demonstrated no evidence of recurrence of disease.    For osteoporosis, she is receiving once yearly Reclast infusions. Last received in May 2024. Tolerating well. Due for repeat DEXA scan in July 2025.          Review of Systems   Constitutional:  Positive for fatigue. Negative for activity change, appetite change and unexpected weight change.   HENT:  Negative for dental problem, sore throat, trouble swallowing and voice change.    Eyes:  Negative for visual disturbance.   Respiratory:  Negative for cough, chest tightness and shortness of breath.    Cardiovascular:  Positive for palpitations. Negative for chest pain and leg swelling.   Gastrointestinal:  Negative for constipation, diarrhea, nausea and vomiting.   Endocrine: Positive for cold intolerance. Negative for heat intolerance.   Genitourinary:  Negative for frequency.   Musculoskeletal:  Positive for arthralgias and joint swelling (L wrist). Negative for back pain, gait problem and myalgias.   Allergic/Immunologic: Positive for environmental allergies. Negative for food allergies.   Neurological:  Negative for dizziness, tremors, weakness, light-headedness, numbness and headaches.   Psychiatric/Behavioral:  Negative for decreased concentration, dysphoric mood and sleep disturbance. The patient is not nervous/anxious.           Objective   /74 (BP Location: Right arm, Patient Position: Sitting, Cuff Size: Standard)   Pulse 71   Temp 97.7 °F (36.5 °C) (Temporal)   Resp 18   Ht 5' 1\" (1.549 m)   Wt 46.3 kg (102 lb)   SpO2 99%   BMI 19.27 kg/m²      Physical Exam  Vitals reviewed.   Constitutional:       General: She is not in acute distress.     Appearance: She is well-developed. She is not ill-appearing.   HENT:      Head: Normocephalic and atraumatic.   Eyes:      Conjunctiva/sclera: Conjunctivae normal.   Cardiovascular:      Rate and Rhythm: Normal rate and regular rhythm.     "  Pulses: Normal pulses.      Heart sounds: Normal heart sounds. No murmur heard.  Pulmonary:      Effort: Pulmonary effort is normal. No respiratory distress.      Breath sounds: Normal breath sounds.   Abdominal:      Palpations: Abdomen is soft.      Tenderness: There is no abdominal tenderness.   Musculoskeletal:         General: Swelling (L wrist) and tenderness present.      Cervical back: Normal range of motion and neck supple.   Skin:     General: Skin is warm and dry.      Capillary Refill: Capillary refill takes less than 2 seconds.   Neurological:      Mental Status: She is alert.   Psychiatric:         Mood and Affect: Mood normal.

## 2025-04-09 NOTE — ASSESSMENT & PLAN NOTE
TSH suppressed with mildly elevated free T4. Patient notes increased fatigue and intermittent palpitations. Continue 75 mcg of levothyroxine Monday-Saturday. Take 1/2 tablet on Sunday. Repeat labs in 8 weeks.   Orders:    TSH, 3rd generation; Future    T4, free; Future

## 2025-04-16 ENCOUNTER — HOSPITAL ENCOUNTER (OUTPATIENT)
Dept: INFUSION CENTER | Facility: HOSPITAL | Age: 60
Discharge: HOME | End: 2025-04-16
Payer: MEDICARE

## 2025-04-16 PROCEDURE — 96523 IRRIG DRUG DELIVERY DEVICE: CPT

## 2025-04-16 NOTE — PROGRESS NOTES
Jessica Larson  tolerated port flush well with no complications.      Jessica Larson is aware of future appt on 5/22 at 11AM.     AVS printed and given to Jessica Larson: No (Declined by Jessica Larson).

## 2025-05-07 ENCOUNTER — OFFICE VISIT (OUTPATIENT)
Dept: OBGYN CLINIC | Facility: CLINIC | Age: 60
End: 2025-05-07
Payer: MEDICARE

## 2025-05-07 VITALS
OXYGEN SATURATION: 99 % | BODY MASS INDEX: 19.63 KG/M2 | WEIGHT: 104 LBS | TEMPERATURE: 97.8 F | RESPIRATION RATE: 16 BRPM | HEIGHT: 61 IN | HEART RATE: 87 BPM

## 2025-05-07 DIAGNOSIS — M65.939 FCR (FLEXOR CARPI RADIALIS) TENOSYNOVITIS: Primary | ICD-10-CM

## 2025-05-07 DIAGNOSIS — M77.8 LEFT WRIST TENDINITIS: ICD-10-CM

## 2025-05-07 PROCEDURE — 99213 OFFICE O/P EST LOW 20 MIN: CPT | Performed by: STUDENT IN AN ORGANIZED HEALTH CARE EDUCATION/TRAINING PROGRAM

## 2025-05-07 RX ORDER — METHYLPREDNISOLONE 4 MG/1
TABLET ORAL
Qty: 1 EACH | Refills: 0 | Status: SHIPPED | OUTPATIENT
Start: 2025-05-07

## 2025-05-07 RX ORDER — HYDROXYCHLOROQUINE SULFATE 200 MG/1
200 TABLET, FILM COATED ORAL DAILY
COMMUNITY
Start: 2025-04-21 | End: 2026-04-21

## 2025-05-07 NOTE — ASSESSMENT & PLAN NOTE
59 y.o. female presents in follow up left upper extremity tendinitis.  The patient has clear tendinitis of the FCR tendon, the flexors to the 2nd through 5th digits primarily of the FDP tendons and of the tendons of the first dorsal extensor compartment.  The FPL and FCU and wrist extensors do not seem to be involved.  Additionally the tenderness over her first dorsal extensor compartment is not her primary pain so I do not think this is purely a case of de Quervain's tenosynovitis.  I discussed with her that given the global flexor tendinitis right now I be hesitant to attempt multiple steroid injections or to think that 1 specific steroid injection would cure her symptoms.  Instead we are going to go with a Medrol Dosepak which is more of a systemic steroid.  Additionally the patient does have swelling about the snuffbox.  She does not have any clear arthritis on x-ray however there could be some intra-articular pathology that we are missing and therefore I think given the persistence of her symptoms, diagnosis of rheumatologic disease which could be leading to wrist involvement and severity of her symptoms an MRI of the wrist would be warranted.    she expressed understanding of the plan and agreed. We encouraged them to contact our office with any questions or concerns.     She will follow-up after MRI of the wrist for further discussion    Orders:    MRI wrist left wo contrast; Future    methylPREDNISolone 4 MG tablet therapy pack; Use as directed on package

## 2025-05-07 NOTE — PROGRESS NOTES
Orthopedic Surgery Management Note  Jessica Lasron (59 y.o. female)  : 1965 Encounter Date: 2025      Assessment and Plan:    Assessment & Plan  FCR (flexor carpi radialis) tenosynovitis  59 y.o. female presents in follow up left upper extremity tendinitis.  The patient has clear tendinitis of the FCR tendon, the flexors to the 2nd through 5th digits primarily of the FDP tendons and of the tendons of the first dorsal extensor compartment.  The FPL and FCU and wrist extensors do not seem to be involved.  Additionally the tenderness over her first dorsal extensor compartment is not her primary pain so I do not think this is purely a case of de Quervain's tenosynovitis.  I discussed with her that given the global flexor tendinitis right now I be hesitant to attempt multiple steroid injections or to think that 1 specific steroid injection would cure her symptoms.  Instead we are going to go with a Medrol Dosepak which is more of a systemic steroid.  Additionally the patient does have swelling about the snuffbox.  She does not have any clear arthritis on x-ray however there could be some intra-articular pathology that we are missing and therefore I think given the persistence of her symptoms, diagnosis of rheumatologic disease which could be leading to wrist involvement and severity of her symptoms an MRI of the wrist would be warranted.    she expressed understanding of the plan and agreed. We encouraged them to contact our office with any questions or concerns.     She will follow-up after MRI of the wrist for further discussion    Orders:    MRI wrist left wo contrast; Future    methylPREDNISolone 4 MG tablet therapy pack; Use as directed on package    Left wrist tendinitis    Orders:    MRI wrist left wo contrast; Future    Chief Complaint:     Left wrist pain    Previous History:   The patient was originally seen on 2025 with  generalized wrist pain that began approximately 3-6 months ago.She states  that the pain is significantly worsened with activity. She has pain with weightbearing, wrist flexion and extension, and when lifting items. She does occasionally experience forearm pain. During hr visit she was provided with a referral to PT and was give an cock-up wrist brace. OT was to make her a custom wrist splint as well.    Interval History:  Today, she continue to have swelling about the wrist and pain in the wrist and hand. Her symptoms increase with use. She is compliant we 24 hour brace wear. She denies new injury or trauma. She only note very mild improvement since her last visit. She was seen by her Rheumatologist in April and she believed the Lyme and Crohn's may be playing a factor in her symptoms.    Past Medical History:  Past Medical History:   Diagnosis Date    Cancer (HCC)     thyroid,vulvar    Chronic pain disorder     Crohn's disease (HCC)     Disease of thyroid gland     GERD (gastroesophageal reflux disease)     Hiatal hernia     Hyperlipidemia     PONV (postoperative nausea and vomiting)      Past Surgical History:   Procedure Laterality Date    CERVICAL FUSION      C 6-7    COLONOSCOPY  2004    2006,2012,2014,2016,2018    EGD  2012    EGD  2016    HYSTERECTOMY  2013    INCISION AND DRAINAGE OF WOUND Right 8/16/2024    Procedure: Right knee arthroscopic I&D;  Surgeon: Boogie Christianson MD;  Location: [facility];  Service: Orthopedics    NECK SURGERY  2011     plates and screws in neck bewtween 6-7    AL INSJ TUNNELED CTR VAD W/SUBQ PORT AGE 5 YR/> Left 10/10/2019    Procedure: INSERTION VENOUS PORT (PORT-A-CATH);  Surgeon: Juan Johns MD;  Location:  MAIN OR;  Service: General    AL LAPS SURG CHOLECYSTECTOMY W/CHOLANGIOGRAPHY N/A 9/27/2018    Procedure: LAPAROSCOPIC CHOLECYSTECTOMY WITH CHOLANGIOGRAM;  Surgeon: Juan Johns MD;  Location:  MAIN OR;  Service: General    SIMPLE VULVECTOMY  12/24/2014    THYROIDECTOMY       Family History   Problem Relation Age of Onset    Crohn's  disease Mother     Osteoporosis Mother             Heart disease Father     Diabetes type II Father         Is under control    Hypertension Father     No Known Problems Sister     No Known Problems Maternal Grandmother     No Known Problems Paternal Grandmother     Hypertension Brother     Thyroid cancer Family         Me,removed     Social History     Socioeconomic History    Marital status: /Civil Union     Spouse name: Not on file    Number of children: Not on file    Years of education: Not on file    Highest education level: Not on file   Occupational History    Not on file   Tobacco Use    Smoking status: Former     Current packs/day: 0.00     Average packs/day: 0.5 packs/day for 15.0 years (7.5 ttl pk-yrs)     Types: Cigarettes     Start date: 1992     Quit date: 2007     Years since quittin.3     Passive exposure: Past    Smokeless tobacco: Never    Tobacco comments:     Medical marijuana   Vaping Use    Vaping status: Never Used   Substance and Sexual Activity    Alcohol use: Yes     Comment: occ wine    Drug use: Yes     Types: Marijuana     Comment: Medical marijuana,crohns    Sexual activity: Not Currently     Partners: Male     Birth control/protection: Other     Comment: Historectomy   Other Topics Concern    Not on file   Social History Narrative    Not on file     Social Drivers of Health     Financial Resource Strain: Not on file   Food Insecurity: No Food Insecurity (2024)    Nursing - Inadequate Food Risk Classification     Worried About Running Out of Food in the Last Year: Never true     Ran Out of Food in the Last Year: Never true     Ran Out of Food in the Last Year: Not on file   Transportation Needs: No Transportation Needs (2024)    PRAPARE - Transportation     Lack of Transportation (Medical): No     Lack of Transportation (Non-Medical): No   Physical Activity: Not on file   Stress: Not on file   Social Connections: Not on file   Intimate Partner  "Violence: Not on file   Housing Stability: Low Risk  (8/16/2024)    Housing Stability Vital Sign     Unable to Pay for Housing in the Last Year: No     Number of Times Moved in the Last Year: 0     Homeless in the Last Year: No     Scheduled Meds:  Continuous Infusions:No current facility-administered medications for this visit.    PRN Meds:.  Allergies   Allergen Reactions    Humira [Adalimumab] Rash    Mercaptopurine Rash     Cancer occurrence--this medication is for Chrohn's disease    Penicillins Lightheadedness    Remicade [Infliximab] Rash    Skyrizi [Risankizumab] Dizziness and Lightheadedness    Vedolizumab Diarrhea    Penicillin G Rash       Physical Examination:    Pulse 87   Temp 97.8 °F (36.6 °C) (Temporal)   Resp 16   Ht 5' 1\" (1.549 m)   Wt 47.2 kg (104 lb)   SpO2 99%   BMI 19.65 kg/m²     Gen: A&Ox3, NAD  Cardiac: regular rate  Chest: non labored breathing  Abdomen: Non-distended      Left Upper Extremity:  Skin CDI  Moderate swelling about the wrist primarily about the snuffbox as on her unaffected side the snuffbox is clearly visible and on her affected side all the contours are no longer visible.  She does have some tenderness along the radiocarpal wrist joint at the radial edge but her primarily tenderness is of the FCR tendon distally.  She does have some tenderness over the first dorsal extensor compartment and is WHAT and Finkelstein positive however compared to a typical de Quervain's I see her symptoms are somewhat mild.  Finally she does have pain with resisted flexion of the DIP joints of the 2nd through 5th digits.  No pain with flexion extension of the thumb  Sensation intact to light touch in the axillary median, ulnar, and radial nerve distributions  5/5 motor to FPL (AIN), EPL (PIN) and FDIO (ulnar)  2+RP    Studies:  No new images to review        Armani Cedillo MD  Hand and Upper Extremity Surgery      *This note was dictated using Dragon voice recognition software. Please " excuse any word substitutions or errors.*    Scribe Attestation      I,:  Lenore Camacho am acting as a scribe while in the presence of the attending physician.:       I,:  Armani Cedillo MD personally performed the services described in this documentation    as scribed in my presence.:

## 2025-05-19 ENCOUNTER — HOSPITAL ENCOUNTER (OUTPATIENT)
Dept: MRI IMAGING | Facility: HOSPITAL | Age: 60
Discharge: HOME | End: 2025-05-19
Attending: STUDENT IN AN ORGANIZED HEALTH CARE EDUCATION/TRAINING PROGRAM
Payer: MEDICARE

## 2025-05-19 DIAGNOSIS — M77.8 LEFT WRIST TENDINITIS: ICD-10-CM

## 2025-05-19 DIAGNOSIS — M65.939 FCR (FLEXOR CARPI RADIALIS) TENOSYNOVITIS: ICD-10-CM

## 2025-05-19 PROCEDURE — 73221 MRI JOINT UPR EXTREM W/O DYE: CPT

## 2025-05-21 ENCOUNTER — APPOINTMENT (OUTPATIENT)
Dept: LAB | Facility: HOSPITAL | Age: 60
End: 2025-05-21
Payer: MEDICARE

## 2025-05-21 ENCOUNTER — RESULTS FOLLOW-UP (OUTPATIENT)
Dept: ENDOCRINOLOGY | Facility: CLINIC | Age: 60
End: 2025-05-21

## 2025-05-21 ENCOUNTER — TELEPHONE (OUTPATIENT)
Dept: INFUSION CENTER | Facility: HOSPITAL | Age: 60
End: 2025-05-21

## 2025-05-21 DIAGNOSIS — M81.0 AGE-RELATED OSTEOPOROSIS WITHOUT CURRENT PATHOLOGICAL FRACTURE: ICD-10-CM

## 2025-05-21 DIAGNOSIS — E89.0 POSTOPERATIVE HYPOTHYROIDISM: ICD-10-CM

## 2025-05-21 DIAGNOSIS — E89.0 POSTOPERATIVE HYPOTHYROIDISM: Primary | ICD-10-CM

## 2025-05-21 LAB
ALBUMIN SERPL BCG-MCNC: 4.5 G/DL (ref 3.5–5)
ALP SERPL-CCNC: 60 U/L (ref 34–104)
ALT SERPL W P-5'-P-CCNC: 17 U/L (ref 7–52)
ANION GAP SERPL CALCULATED.3IONS-SCNC: 6 MMOL/L (ref 4–13)
AST SERPL W P-5'-P-CCNC: 22 U/L (ref 13–39)
BILIRUB SERPL-MCNC: 0.42 MG/DL (ref 0.2–1)
BUN SERPL-MCNC: 10 MG/DL (ref 5–25)
CALCIUM SERPL-MCNC: 9.4 MG/DL (ref 8.4–10.2)
CHLORIDE SERPL-SCNC: 99 MMOL/L (ref 96–108)
CO2 SERPL-SCNC: 33 MMOL/L (ref 21–32)
CREAT SERPL-MCNC: 0.67 MG/DL (ref 0.6–1.3)
GFR SERPL CREATININE-BSD FRML MDRD: 96 ML/MIN/1.73SQ M
GLUCOSE SERPL-MCNC: 94 MG/DL (ref 65–140)
POTASSIUM SERPL-SCNC: 4.4 MMOL/L (ref 3.5–5.3)
PROT SERPL-MCNC: 7.2 G/DL (ref 6.4–8.4)
SODIUM SERPL-SCNC: 138 MMOL/L (ref 135–147)
T4 FREE SERPL-MCNC: 0.78 NG/DL (ref 0.61–1.12)
TSH SERPL DL<=0.05 MIU/L-ACNC: 1.71 UIU/ML (ref 0.45–4.5)

## 2025-05-21 PROCEDURE — 36415 COLL VENOUS BLD VENIPUNCTURE: CPT

## 2025-05-21 PROCEDURE — 80053 COMPREHEN METABOLIC PANEL: CPT

## 2025-05-21 PROCEDURE — 84443 ASSAY THYROID STIM HORMONE: CPT

## 2025-05-21 PROCEDURE — 84439 ASSAY OF FREE THYROXINE: CPT

## 2025-05-22 ENCOUNTER — HOSPITAL ENCOUNTER (OUTPATIENT)
Dept: INFUSION CENTER | Facility: HOSPITAL | Age: 60
End: 2025-05-22
Attending: STUDENT IN AN ORGANIZED HEALTH CARE EDUCATION/TRAINING PROGRAM
Payer: MEDICARE

## 2025-05-22 ENCOUNTER — APPOINTMENT (OUTPATIENT)
Dept: LAB | Facility: HOSPITAL | Age: 60
End: 2025-05-22
Payer: MEDICARE

## 2025-05-22 ENCOUNTER — TRANSCRIBE ORDERS (OUTPATIENT)
Dept: LAB | Facility: HOSPITAL | Age: 60
End: 2025-05-22

## 2025-05-22 VITALS
HEART RATE: 66 BPM | BODY MASS INDEX: 19.27 KG/M2 | OXYGEN SATURATION: 99 % | DIASTOLIC BLOOD PRESSURE: 73 MMHG | RESPIRATION RATE: 16 BRPM | SYSTOLIC BLOOD PRESSURE: 136 MMHG | TEMPERATURE: 97.8 F | WEIGHT: 102 LBS

## 2025-05-22 DIAGNOSIS — M81.0 AGE-RELATED OSTEOPOROSIS WITHOUT CURRENT PATHOLOGICAL FRACTURE: Primary | ICD-10-CM

## 2025-05-22 DIAGNOSIS — M25.532 LEFT WRIST PAIN: ICD-10-CM

## 2025-05-22 DIAGNOSIS — M25.532 LEFT WRIST PAIN: Primary | ICD-10-CM

## 2025-05-22 LAB
ERYTHROCYTE [SEDIMENTATION RATE] IN BLOOD: 10 MM/HOUR (ref 0–29)
MAGNESIUM SERPL-MCNC: 2.2 MG/DL (ref 1.9–2.7)
TSH SERPL DL<=0.05 MIU/L-ACNC: 1.8 UIU/ML (ref 0.45–4.5)

## 2025-05-22 PROCEDURE — 84630 ASSAY OF ZINC: CPT

## 2025-05-22 PROCEDURE — 85652 RBC SED RATE AUTOMATED: CPT

## 2025-05-22 PROCEDURE — 82607 VITAMIN B-12: CPT

## 2025-05-22 PROCEDURE — 84252 ASSAY OF VITAMIN B-2: CPT

## 2025-05-22 PROCEDURE — 84425 ASSAY OF VITAMIN B-1: CPT

## 2025-05-22 PROCEDURE — 36415 COLL VENOUS BLD VENIPUNCTURE: CPT

## 2025-05-22 PROCEDURE — 84443 ASSAY THYROID STIM HORMONE: CPT

## 2025-05-22 PROCEDURE — 84439 ASSAY OF FREE THYROXINE: CPT

## 2025-05-22 PROCEDURE — 82390 ASSAY OF CERULOPLASMIN: CPT

## 2025-05-22 PROCEDURE — 96365 THER/PROPH/DIAG IV INF INIT: CPT

## 2025-05-22 PROCEDURE — 84207 ASSAY OF VITAMIN B-6: CPT

## 2025-05-22 PROCEDURE — 83735 ASSAY OF MAGNESIUM: CPT

## 2025-05-22 PROCEDURE — 82525 ASSAY OF COPPER: CPT

## 2025-05-22 PROCEDURE — 86780 TREPONEMA PALLIDUM: CPT

## 2025-05-22 RX ORDER — ZOLEDRONIC ACID 0.05 MG/ML
5 INJECTION, SOLUTION INTRAVENOUS ONCE
Status: COMPLETED | OUTPATIENT
Start: 2025-05-22 | End: 2025-05-22

## 2025-05-22 RX ORDER — SODIUM CHLORIDE 9 MG/ML
20 INJECTION, SOLUTION INTRAVENOUS ONCE
OUTPATIENT
Start: 2026-05-21

## 2025-05-22 RX ORDER — SODIUM CHLORIDE 9 MG/ML
20 INJECTION, SOLUTION INTRAVENOUS ONCE
Status: COMPLETED | OUTPATIENT
Start: 2025-05-22 | End: 2025-05-22

## 2025-05-22 RX ORDER — ZOLEDRONIC ACID 0.05 MG/ML
5 INJECTION, SOLUTION INTRAVENOUS ONCE
OUTPATIENT
Start: 2026-05-21

## 2025-05-22 RX ADMIN — ZOLEDRONIC ACID 5 MG: 5 INJECTION, SOLUTION INTRAVENOUS at 12:05

## 2025-05-22 RX ADMIN — SODIUM CHLORIDE 20 ML/HR: 0.9 INJECTION, SOLUTION INTRAVENOUS at 12:05

## 2025-05-22 NOTE — PROGRESS NOTES
Jessica Larson  tolerated reclast treatment well with no complications.  CRCL was 66 ml/min and with in parameters to treat     Jessica Larson is aware of future appt on 07/03/25 at 1330.     AVS No (Declined by Jessica Larson) pt has mychart     Pt AAOx3 at time of discharge

## 2025-05-23 LAB
CERULOPLASMIN SERPL-MCNC: 28.4 MG/DL (ref 19–39)
T4 FREE SERPL-MCNC: 0.95 NG/DL (ref 0.61–1.12)
TREPONEMA PALLIDUM IGG+IGM AB [PRESENCE] IN SERUM OR PLASMA BY IMMUNOASSAY: NORMAL
VIT B12 SERPL-MCNC: 318 PG/ML (ref 180–914)

## 2025-05-25 LAB
COPPER SERPL-MCNC: 117 UG/DL (ref 80–158)
ZINC SERPL-MCNC: 69 UG/DL (ref 44–115)

## 2025-05-27 LAB
VIT B1 BLD-SCNC: 112.4 NMOL/L (ref 66.5–200)
VIT B6 SERPL-MCNC: 22.7 UG/L (ref 3.4–65.2)

## 2025-05-28 LAB — VIT B2 BLD-MCNC: 222 UG/L (ref 137–370)

## 2025-06-04 ENCOUNTER — OFFICE VISIT (OUTPATIENT)
Dept: OBGYN CLINIC | Facility: CLINIC | Age: 60
End: 2025-06-04
Payer: MEDICARE

## 2025-06-04 VITALS
TEMPERATURE: 99 F | HEIGHT: 61 IN | RESPIRATION RATE: 16 BRPM | HEART RATE: 65 BPM | BODY MASS INDEX: 19.48 KG/M2 | WEIGHT: 103.2 LBS | OXYGEN SATURATION: 97 %

## 2025-06-04 DIAGNOSIS — M02.332: Primary | ICD-10-CM

## 2025-06-04 PROCEDURE — 99213 OFFICE O/P EST LOW 20 MIN: CPT | Performed by: STUDENT IN AN ORGANIZED HEALTH CARE EDUCATION/TRAINING PROGRAM

## 2025-06-04 NOTE — PROGRESS NOTES
Orthopedic Surgery Management Note  Jessica Larson (59 y.o. female)  : 1965 Encounter Date: 2025      Assessment and Plan:    Assessment & Plan  Reactive arthritis of left wrist (HCC)  MRI of the left wrist was reviewed in the office today.   Discussed that next step may be cortisone injection in the left wrist. Patient had relief of symptoms following the injection, so declines at this time.   Recommend conservative management of symptoms with bracing, oral analgesics, ice, and rest when symptoms flare.   Follow-up as needed.            Chief Complaint:     Left wrist follow-up    Previous History:   Patient was last seen in the office on 2025 for left wrist extensor tendonitis. MRI of left wrist was ordered at that time, and medrol dosepak was prescribed.     Interval History:  Patient obtained the MRI as directed. Patient reports that her symptoms really improved after receiving the medrol dosepak.     Past Medical History:  Past Medical History[1]  Past Surgical History[2]  Family History[3]  Social History     Socioeconomic History    Marital status: /Civil Union     Spouse name: Not on file    Number of children: Not on file    Years of education: Not on file    Highest education level: Not on file   Occupational History    Not on file   Tobacco Use    Smoking status: Former     Current packs/day: 0.00     Average packs/day: 0.5 packs/day for 15.0 years (7.5 ttl pk-yrs)     Types: Cigarettes     Start date: 1992     Quit date: 2007     Years since quittin.4     Passive exposure: Past    Smokeless tobacco: Never    Tobacco comments:     Medical marijuana   Vaping Use    Vaping status: Never Used   Substance and Sexual Activity    Alcohol use: Yes     Comment: occ wine    Drug use: Yes     Types: Marijuana     Comment: Medical marijuana,crohns    Sexual activity: Not Currently     Partners: Male     Birth control/protection: Other     Comment: Historectomy   Other Topics  "Concern    Not on file   Social History Narrative    Not on file     Social Drivers of Health     Financial Resource Strain: Not on file   Food Insecurity: No Food Insecurity (8/16/2024)    Nursing - Inadequate Food Risk Classification     Worried About Running Out of Food in the Last Year: Never true     Ran Out of Food in the Last Year: Never true     Ran Out of Food in the Last Year: Not on file   Transportation Needs: No Transportation Needs (8/16/2024)    PRAPARE - Transportation     Lack of Transportation (Medical): No     Lack of Transportation (Non-Medical): No   Physical Activity: Not on file   Stress: Not on file   Social Connections: Not on file   Intimate Partner Violence: Not on file   Housing Stability: Low Risk  (8/16/2024)    Housing Stability Vital Sign     Unable to Pay for Housing in the Last Year: No     Number of Times Moved in the Last Year: 0     Homeless in the Last Year: No     Scheduled Meds:  Continuous Infusions:No current facility-administered medications for this visit.    PRN Meds:.  Allergies[4]    Physical Examination:    Resp 16   Ht 5' 1\" (1.549 m)   BMI 19.27 kg/m²     Gen: A&Ox3, NAD  Cardiac: regular rate  Chest: non labored breathing  Abdomen: Non-distended        Left Upper Extremity:  Skin CDI  No obvious deformity of the shoulder, arm, elbow, forearm, wrist, hand  Sensation intact to light touch in the axillary median, ulnar, and radial nerve distributions  No appreciable wrist swelling  Painless wrist range of motion  NV intact    Studies:  Radiographs: I personally reviewed and independently interpreted the available radiographs.   5/19/2025: MRI of the left wrist, multiple views, demonstrate small wrist joint effusion, diffuse subchondral marrow edema, bony erosions in the distal radius, and mild extensor carpi ulnaris tenosynovitis.        Armani Cedillo MD  Hand and Upper Extremity Surgery      *This note was dictated using Dragon voice recognition software. Please " excuse any word substitutions or errors.*      Scribe Attestation      I,:  Mary Garza PA-C am acting as a scribe while in the presence of the attending physician.:       I,:  Armani Cedillo MD personally performed the services described in this documentation    as scribed in my presence.:                  [1]   Past Medical History:  Diagnosis Date    Cancer (HCC)     thyroid,vulvar    Chronic pain disorder     Crohn's disease (HCC)     Disease of thyroid gland     GERD (gastroesophageal reflux disease)     Hiatal hernia     Hyperlipidemia     PONV (postoperative nausea and vomiting)    [2]   Past Surgical History:  Procedure Laterality Date    CERVICAL FUSION      C 6-7    COLONOSCOPY  2004    ,,,,    EGD      EGD      HYSTERECTOMY      INCISION AND DRAINAGE OF WOUND Right 2024    Procedure: Right knee arthroscopic I&D;  Surgeon: Boogie Christianson MD;  Location: [facility];  Service: Orthopedics    NECK SURGERY       plates and screws in neck bewtween 6-7    ND INSJ TUNNELED CTR VAD W/SUBQ PORT AGE 5 YR/> Left 10/10/2019    Procedure: INSERTION VENOUS PORT (PORT-A-CATH);  Surgeon: Juan Johns MD;  Location:  MAIN OR;  Service: General    ND LAPS SURG CHOLECYSTECTOMY W/CHOLANGIOGRAPHY N/A 2018    Procedure: LAPAROSCOPIC CHOLECYSTECTOMY WITH CHOLANGIOGRAM;  Surgeon: Juan Johns MD;  Location:  MAIN OR;  Service: General    SIMPLE VULVECTOMY  2014    THYROIDECTOMY     [3]   Family History  Problem Relation Name Age of Onset    Crohn's disease Mother Linh     Osteoporosis Mother Linh             Heart disease Father Vinh     Diabetes type II Father Vinh         Is under control    Hypertension Father Vinh     No Known Problems Sister olivia     No Known Problems Maternal Grandmother      No Known Problems Paternal Grandmother      Hypertension Brother Miko     Thyroid cancer Family Jessica         Me,removed   [4]    Allergies  Allergen Reactions    Humira [Adalimumab] Rash    Mercaptopurine Rash     Cancer occurrence--this medication is for Chrohn's disease    Penicillins Lightheadedness    Remicade [Infliximab] Rash    Skyrizi [Risankizumab] Dizziness and Lightheadedness    Vedolizumab Diarrhea    Penicillin G Rash

## 2025-07-03 ENCOUNTER — HOSPITAL ENCOUNTER (OUTPATIENT)
Dept: INFUSION CENTER | Facility: HOSPITAL | Age: 60
Discharge: HOME | End: 2025-07-03
Payer: MEDICARE

## 2025-07-03 PROCEDURE — 96523 IRRIG DRUG DELIVERY DEVICE: CPT

## 2025-07-03 NOTE — PROGRESS NOTES
Port flushed freely.  Good blood return noted.  Port deaccessed without issue.  Patient tolerated well. Jessica Larson  tolerated treatment well with no complications.      Jessica Larson is aware of future appt on 08/14/25 at 1300.     AVS  No (Declined by Jessica Larson) pt has mychart      Patient ambulated off unit without incident.  All personal belongings taken with patient.

## 2025-07-14 ENCOUNTER — HOSPITAL ENCOUNTER (OUTPATIENT)
Dept: MRI IMAGING | Facility: HOSPITAL | Age: 60
Discharge: HOME | End: 2025-07-14
Attending: INTERNAL MEDICINE
Payer: MEDICARE

## 2025-07-14 DIAGNOSIS — R41.3 OTHER AMNESIA: ICD-10-CM

## 2025-07-14 PROCEDURE — 70551 MRI BRAIN STEM W/O DYE: CPT

## 2025-07-28 ENCOUNTER — HOSPITAL ENCOUNTER (OUTPATIENT)
Dept: BONE DENSITY | Facility: HOSPITAL | Age: 60
Discharge: HOME | End: 2025-07-28
Attending: NURSE PRACTITIONER
Payer: MEDICARE

## 2025-07-28 VITALS — WEIGHT: 102 LBS | HEIGHT: 61 IN | BODY MASS INDEX: 19.26 KG/M2

## 2025-07-28 DIAGNOSIS — M81.0 AGE-RELATED OSTEOPOROSIS WITHOUT CURRENT PATHOLOGICAL FRACTURE: ICD-10-CM

## 2025-07-28 PROCEDURE — 77080 DXA BONE DENSITY AXIAL: CPT

## 2025-08-14 ENCOUNTER — HOSPITAL ENCOUNTER (OUTPATIENT)
Dept: INFUSION CENTER | Facility: HOSPITAL | Age: 60
Discharge: HOME | End: 2025-08-14

## (undated) DEVICE — U-DRAPE: Brand: CONVERTORS

## (undated) DEVICE — ABDOMINAL PAD: Brand: DERMACEA

## (undated) DEVICE — SPONGE LAP 18 X 18 IN STRL RFD

## (undated) DEVICE — INTENDED FOR TISSUE SEPARATION, AND OTHER PROCEDURES THAT REQUIRE A SHARP SURGICAL BLADE TO PUNCTURE OR CUT.: Brand: BARD-PARKER ® CARBON RIB-BACK BLADES

## (undated) DEVICE — CHLORAPREP HI-LITE 26ML ORANGE

## (undated) DEVICE — GLOVE INDICATOR PI UNDERGLOVE SZ 8 BLUE

## (undated) DEVICE — SPECIMEN CONTAINER STERILE PEEL PACK

## (undated) DEVICE — TROCARS: Brand: KII® OPTICAL ACCESS SYSTEM

## (undated) DEVICE — LAPAROSCOPIC SCISSORS: Brand: EPIX LAPAROSCOPIC SCISSORS

## (undated) DEVICE — CLIP ENDO 5MM M/L

## (undated) DEVICE — BAG DECANTER

## (undated) DEVICE — CULTURE TUBE ANAEROBIC

## (undated) DEVICE — BETHLEHEM UNIVERSAL  MIONR EXT: Brand: CARDINAL HEALTH

## (undated) DEVICE — CULTURE TUBE AEROBIC

## (undated) DEVICE — ACE WRAP 6 IN XL STERILE

## (undated) DEVICE — GAUZE,SPONGE,2"X2",8PLY,STERILE,LF,2'S: Brand: MEDLINE

## (undated) DEVICE — DRAPE C-ARM X-RAY

## (undated) DEVICE — 3M™ TEGADERM™ TRANSPARENT FILM DRESSING FRAME STYLE, 1624W, 2-3/8 IN X 2-3/4 IN (6 CM X 7 CM), 100/CT 4CT/CASE: Brand: 3M™ TEGADERM™

## (undated) DEVICE — Device: Brand: THUNDERBEAT 5 MM, 35 CM, FRONT-ACTUATED GRIP

## (undated) DEVICE — BETHLEHEM UNIVERSAL MINOR GEN: Brand: CARDINAL HEALTH

## (undated) DEVICE — SUT ETHILON 4-0 PS-2 18 IN 1667H

## (undated) DEVICE — CUP MEDICINE 1OZ 5000/CS 50/PLT: Brand: MEDEGEN MEDICAL PRODUCTS, LLC

## (undated) DEVICE — SUT VICRYL 0 UR-6 27 IN J603H

## (undated) DEVICE — ENDOPOUCH RETRIEVER SPECIMEN RETRIEVAL BAGS: Brand: ENDOPOUCH RETRIEVER

## (undated) DEVICE — ADHESIVE SKIN CLSR DERMABOND NX

## (undated) DEVICE — SYRINGE 10ML LL

## (undated) DEVICE — SYRINGE 5ML LL

## (undated) DEVICE — TIBURON SPLIT SHEET: Brand: CONVERTORS

## (undated) DEVICE — NEEDLE 25G X 1 1/2

## (undated) DEVICE — BLADE SHAVER DISSECTOR  4MM 13CM CRV COOLCUT

## (undated) DEVICE — STOCKINETTE,IMPERVIOUS,12X48,STERILE: Brand: MEDLINE

## (undated) DEVICE — GLOVE INDICATOR UNDERGLOVE SZ 7.5 GREEN

## (undated) DEVICE — MAT FLOOR STEP DRI 24 X 36 IN N-STRL

## (undated) DEVICE — GLOVE SRG BIOGEL 7.5

## (undated) DEVICE — GLOVE SRG BIOGEL 7

## (undated) DEVICE — GARMENT,MEDLINE,DVT,INT,CALF,FOAM,MED: Brand: MEDLINE

## (undated) DEVICE — TRAP SPECIMEN

## (undated) DEVICE — CUFF TOURNIQUET 24 X 4 IN QUICK CONNECT DISP 1BLA

## (undated) DEVICE — TIDISHIELD C-ARM EQUIPMENT COVERS CLEAR POLYETHYLENE STERILE 42IN X 74IN 20 PER CASE: Brand: TIDISHIELD

## (undated) DEVICE — LAPAROSCOPY PACK: Brand: MEDLINE INDUSTRIES, INC.

## (undated) DEVICE — ADHESIVE SKIN HIGH VISCOSITY EXOFIN 1ML

## (undated) DEVICE — GAUZE SPONGES,16 PLY: Brand: CURITY

## (undated) DEVICE — ENDOPATH XCEL BLADELESS TROCARS WITH STABILITY SLEEVES: Brand: ENDOPATH XCEL

## (undated) DEVICE — TROCAR: Brand: KII FIOS FIRST ENTRY

## (undated) DEVICE — LAPROSCOPIC CHOLANGIOGR. CATH KIT

## (undated) DEVICE — SYRINGE 20ML LL

## (undated) DEVICE — PADDING CAST 6IN COTTON STRL

## (undated) DEVICE — GROUNDING PAD UNIVERSAL SLW

## (undated) DEVICE — POOLE SUCTION HANDLE W/TUBING: Brand: CARDINAL HEALTH

## (undated) DEVICE — GAUZE SPONGES,8 PLY: Brand: CURITY

## (undated) DEVICE — SUT MONOCRYL 4-0 PS-2 27 IN Y426H

## (undated) DEVICE — OCCLUSIVE GAUZE STRIP,3% BISMUTH TRIBROMOPHENATE IN PETROLATUM BLEND: Brand: XEROFORM

## (undated) DEVICE — INTENDED FOR TISSUE SEPARATION, AND OTHER PROCEDURES THAT REQUIRE A SHARP SURGICAL BLADE TO PUNCTURE OR CUT.: Brand: BARD-PARKER SAFETY BLADES SIZE 10, STERILE

## (undated) DEVICE — NEEDLE SPINAL 22G X 5IN QUINCKE

## (undated) DEVICE — PENCIL ROCKER SWITCH CAUTERY HAND CONTROL

## (undated) DEVICE — ASTOUND IMPERVIOUS SURGICAL GOWN: Brand: CONVERTORS